# Patient Record
Sex: FEMALE | Race: WHITE | NOT HISPANIC OR LATINO | Employment: FULL TIME | URBAN - METROPOLITAN AREA
[De-identification: names, ages, dates, MRNs, and addresses within clinical notes are randomized per-mention and may not be internally consistent; named-entity substitution may affect disease eponyms.]

---

## 2017-01-06 ENCOUNTER — DOCUMENTATION ONLY (OUTPATIENT)
Dept: LAB | Facility: CLINIC | Age: 52
End: 2017-01-06

## 2017-01-06 DIAGNOSIS — E89.0 POSTSURGICAL HYPOTHYROIDISM: Primary | ICD-10-CM

## 2017-01-10 ENCOUNTER — OFFICE VISIT (OUTPATIENT)
Dept: ENDOCRINOLOGY | Facility: CLINIC | Age: 52
End: 2017-01-10
Payer: COMMERCIAL

## 2017-01-10 VITALS
BODY MASS INDEX: 19.91 KG/M2 | DIASTOLIC BLOOD PRESSURE: 84 MMHG | WEIGHT: 112.4 LBS | HEIGHT: 63 IN | HEART RATE: 96 BPM | SYSTOLIC BLOOD PRESSURE: 115 MMHG

## 2017-01-10 DIAGNOSIS — E89.0 POSTSURGICAL HYPOTHYROIDISM: Primary | ICD-10-CM

## 2017-01-10 DIAGNOSIS — E04.1 THYROID NODULE: ICD-10-CM

## 2017-01-10 DIAGNOSIS — N95.1 MENOPAUSAL SYNDROME (HOT FLASHES): ICD-10-CM

## 2017-01-10 DIAGNOSIS — E89.0 POSTSURGICAL HYPOTHYROIDISM: ICD-10-CM

## 2017-01-10 LAB
T4 FREE SERPL-MCNC: 1.29 NG/DL (ref 0.76–1.46)
TSH SERPL DL<=0.05 MIU/L-ACNC: 3.03 MU/L (ref 0.4–4)

## 2017-01-10 PROCEDURE — 84443 ASSAY THYROID STIM HORMONE: CPT | Performed by: INTERNAL MEDICINE

## 2017-01-10 PROCEDURE — 36415 COLL VENOUS BLD VENIPUNCTURE: CPT | Performed by: INTERNAL MEDICINE

## 2017-01-10 PROCEDURE — 99214 OFFICE O/P EST MOD 30 MIN: CPT | Performed by: INTERNAL MEDICINE

## 2017-01-10 PROCEDURE — 84439 ASSAY OF FREE THYROXINE: CPT | Performed by: INTERNAL MEDICINE

## 2017-01-10 RX ORDER — LAMOTRIGINE 100 MG/1
200 TABLET ORAL DAILY
COMMUNITY
End: 2018-11-28

## 2017-01-10 NOTE — Clinical Note
1/10/2017      RE: Cynthia Marie Barthel  [address] [place] [address]         The patient is seen in f/up for thyroid nodules and hypothyroidism.    Today she feels good and she denies any specific complaints with the exception of hot flashes, which become more severe over the last 2-4 months, both during daytime and at night. She has been experiencing occasional hot flashes for over 1 year. For the last 8 years, she hasn't been having menstrual periods, as she had a Mirena IUD placed.   The antidepressive medications were recently changed and she reports feeling much better.     Johanne first discovered a lump on the left side of the neck in the year 2000. Thyroid ultrasound revealed a dominant left thyroid nodule and a few small thyroid nodules present on the right thyroid lobe. The result of the fine needle aspiration of the left nodule was inconclusive. The patient underwent left hemithyroidectomy in November 2000. After surgery, she was started on levothyroxine. Subsequently, she had f/up thyroid ultrasounds done for a small right nodule in October 2012, December 2012 and in October 2013. On the most recent ultrasound images, the small colloid looking nodule in the right lobe measured 0.7 cm.    Current levothyroxine replacement dose is 75  g daily.     Past Medical History   Scoliosis - at birth   Fusion of the spine surgery   3 sinus surgeries   Allergies   Thyroid nodules   L hemithyroidectomy   Hypothyroidism post thyroidectomy   She had a mammogram done in December 2011 which was reportedly normal.  Mild sleep apnea - dx 2015 not using the CPAP machine   IUD for 8 years   Depression since 4 yo, treated since 2001    Current Medications  Prescription Medications as of 1/10/2017             lamoTRIgine (LAMICTAL) 100 MG tablet Take 150 mg by mouth daily    tiZANidine (ZANAFLEX) 4 MG tablet Take 4 mg by mouth 3 times daily as needed for muscle spasms    tazarotene (TAZORAC) 0.05 % gel Apply a  pea sized amount to the face at bedtime.    fluorouracil (EFUDEX) 5 % cream Use  Efudex twice daily for 2-3 weeks or until the onset of irritation.    levothyroxine (SYNTHROID, LEVOTHROID) 75 MCG tablet Take 1 tablet (75 mcg) by mouth daily    buPROPion (WELLBUTRIN) 75 MG tablet Take 100 mg by mouth daily     vitamin E 400 UNIT capsule Take  by mouth daily.    Glucosamine-Chondroitin (GLUCOSAMINE CHONDR COMPLEX PO) Take  by mouth.    Omega-3 Fatty Acids (FISH OIL PO) Take  by mouth daily.    Multiple Vitamin CAPS Take  by mouth daily.    B Complex Vitamins (B COMPLEX 1) TABS Take  by mouth daily.    Cholecalciferol (VITAMIN D) 1000 UNIT capsule Take 1 capsule by mouth daily.        Family History  Father, grandfather, uncles had depression. No thyroid. No diabetes. Great uncles and aunts had colon cancer.  Maternal aunts - osteoporosis.     Social History  . No children. Quit smoking 2 years ago. Quit drinking alcohol a few months ao. Occupation: . Takes MVI, B complex, fish oil, [employer].     Review of Systems   Systemic:             Weight stable, no sign fatigue   Eye:                      No eye symptoms   Krystle-Laryngeal:     No krystle-laryngeal symptoms, no dysphagia, no hoarseness, no cough     Breast:                  No breast symptoms  Cardiovascular:    No cardiovascular symptoms, no CP or palpitations   Pulmonary:           No pulmonary symptoms, no SOB or cough    Gastrointestinal:   No diarrhea or constipation   Genitourinary:       no increased thirst, no increased urination   Endocrine:            Has a Mirena IUD  - placed 2013; hot flashes for > one year - more prevalent in the last few months   Neurological:       occasional headaches in am, no tremor, numbness in her back longstaning, no dizziness   Musculoskeletal:  joint pain - hips, knees, neck.   Skin:                     Mild dry skin, no hair falling out   Psychological:      Depression is well controlled.     Vital Signs    "  Previous Weights:   Wt Readings from Last 10 Encounters:   01/10/17 50.984 kg (112 lb 6.4 oz)   01/06/16 55.537 kg (122 lb 7 oz)   11/11/14 53.524 kg (118 lb)   10/29/13 51.256 kg (113 lb)   12/26/12 53.388 kg (117 lb 11.2 oz)   10/02/12 53.842 kg (118 lb 11.2 oz)   10/05/11 54.432 kg (120 lb)   08/24/11 53.978 kg (119 lb)       /84 mmHg  Pulse 96  Ht 1.588 m (5' 2.5\")  Wt 50.984 kg (112 lb 6.4 oz)  BMI 20.22 kg/m2      Physical Exam  General Appearance: scoliosis noted;  well nourished and in no distress     Eyes:  conjutivae and extra-ocular motions are normal.                                    pupils round and reactive to light, no lid lag, no stare    HEENT:   oropharynx clear and moist, no JVD, no bruits      unable to palpate the thyroid, palpable small R medial supraclavicular mass, stable   Cardiovascular:  regular rhythm, no murmurs, distal pulse palpable, no edema  Respiratory:        chest clear, no rales, no rhonchi   Musculoskeletal:  normal tone and strength  Psychological:          affect depressed, judgment normal  Skin:  warm, no lesions  Neurological:  reflexes normal and symmetric, no resting tremor.     Lab Results  Negative thyroglobulin antibody and a thyroglobulin level of 2.2 in November 2009.  9/27/13  FSH 5  Estradiol 68 (Postmenopausal range less than 54.7).    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 There was no evidence of malignancy. A single parathyroid gland was removed.  TSH   Date Value Ref Range Status   01/10/2017 3.03 0.40 - 4.00 mU/L Final     T4 FREE   Date Value Ref Range Status   01/10/2017 1.29 0.76 - 1.46 ng/dL Final       Assessment/Plan    1. Hypothyroidism secondary to nahomy-thyroidectomy  The patient is clinically and biochemically euthyroid.  I recommended to continue to take the same dose of levothyroxine.    2. Right subcentimeter thyroid nodule - with no suspicious features on the prior thyroid " USs. We'll continue to monitor clinically.    3. Hot flashes   Discussed about the option of starting HRT, especially if they worsens. Counseled on benefits and side effects. Otherwise, she can discuss with her phycologist the option of trying antidepressive medications which can also address the hot flashes.     RTC 1 year.  No orders of the defined types were placed in this encounter.                             Gabriela Muir MD

## 2017-01-10 NOTE — MR AVS SNAPSHOT
After Visit Summary   1/10/2017    Cynthia Marie Barthel    MRN: [id number]           Patient Information     Date Of Birth          1965        Visit Information        Provider Department      1/10/2017 11:00 AM Gabriela Muir MD [facility]        Care Instructions    Thank you for choosing the [facility], Department of Endocrinology. It has been a pleasure servicing you today. Please contact us at [phone] with any questions. If you need to speak to an Endocrinologist and it is after 5:00 pm or on a weekend, please call the On-Call Endocrinologist at [phone].          Follow-ups after your visit        Who to contact     If you have questions or need follow up information about today's clinic visit or your schedule please contact [facility] directly at [phone].  Normal or non-critical lab and imaging results will be communicated to you by NextUserhart, letter or phone within 4 business days after the clinic has received the results. If you do not hear from us within 7 days, please contact the clinic through NextUserhart or phone. If you have a critical or abnormal lab result, we will notify you by phone as soon as possible.  Submit refill requests through Kyron or call your pharmacy and they will forward the refill request to us. Please allow 3 business days for your refill to be completed.          Additional Information About Your Visit        MyChart Information     Kyron gives you secure access to your electronic health record. If you see a primary care provider, you can also send messages to your care team and make appointments. If you have questions, please call your primary care clinic.  If you do not have a primary care provider, please call [phone] and they will assist you.      Kyron is an electronic gateway that provides easy, online access to your medical records. With Kyron, you can request a clinic  "appointment, read your test results, renew a prescription or communicate with your care team.     To access your existing account, please contact your [facility] or call [phone] for assistance.        Care EveryWhere ID     This is your Care EveryWhere ID. This could be used by other organizations to access your [place] medical records  [id number]        Your Vitals Were     Pulse Height BMI (Body Mass Index)             96 1.588 m (5' 2.5\") 20.22 kg/m2          Blood Pressure from Last 3 Encounters:   01/10/17 115/84   01/06/16 94/58   11/11/14 91/58    Weight from Last 3 Encounters:   01/10/17 50.984 kg (112 lb 6.4 oz)   01/06/16 55.537 kg (122 lb 7 oz)   11/11/14 53.524 kg (118 lb)              Today, you had the following     No orders found for display       Primary Care Provider Office Phone # Fax #    Corazon Jara [phone]        [facility] [address] [place] [address] [address] [facility] [address]        Thank you!     Thank you for choosing [facility]  for your care. Our goal is always to provide you with excellent care. Hearing back from our patients is one way we can continue to improve our services. Please take a few minutes to complete the written survey that you may receive in the mail after your visit with us. Thank you!             Your Updated Medication List - Protect others around you: Learn how to safely use, store and throw away your medicines at www.disposemymeds.org.          This list is accurate as of: 1/10/17 11:47 AM.  Always use your most recent med list.                   Brand Name Dispense Instructions for use    B COMPLEX 1 Tabs      Take  by mouth daily.       FISH OIL PO      Take  by mouth daily.       fluorouracil 5 % cream    EFUDEX    40 g    Use  Efudex twice daily for 2-3 weeks or until the onset of irritation.       GLUCOSAMINE CHONDR COMPLEX PO      Take  by mouth.       lamoTRIgine 100 MG tablet    " LaMICtal     Take 150 mg by mouth daily       levothyroxine 75 MCG tablet    SYNTHROID/LEVOTHROID    90 tablet    Take 1 tablet (75 mcg) by mouth daily       Multiple vitamin Caps      Take  by mouth daily.       tazarotene 0.05 % topical gel    TAZORAC    30 g    Apply a pea sized amount to the face at bedtime.       tiZANidine 4 MG tablet    ZANAFLEX     Take 4 mg by mouth 3 times daily as needed for muscle spasms       vitamin D 1000 UNITS capsule      Take 1 capsule by mouth daily.       vitamin E 400 UNIT capsule      Take  by mouth daily.       WELLBUTRIN 75 MG tablet   Generic drug:  buPROPion      Take 100 mg by mouth daily

## 2017-01-10 NOTE — PATIENT INSTRUCTIONS
Thank you for choosing the [facility], Department of Endocrinology. It has been a pleasure servicing you today. Please contact us at [phone] with any questions. If you need to speak to an Endocrinologist and it is after 5:00 pm or on a weekend, please call the On-Call Endocrinologist at [phone].

## 2017-01-10 NOTE — PROGRESS NOTES
The patient is seen in f/up for thyroid nodules and hypothyroidism.    Today she feels good and she denies any specific complaints with the exception of hot flashes, which become more severe over the last 2-4 months, both during daytime and at night. She has been experiencing occasional hot flashes for over 1 year. For the last 8 years, she hasn't been having menstrual periods, as she had a Mirena IUD placed.   The antidepressive medications were recently changed and she reports feeling much better.     Johanne first discovered a lump on the left side of the neck in the year 2000. Thyroid ultrasound revealed a dominant left thyroid nodule and a few small thyroid nodules present on the right thyroid lobe. The result of the fine needle aspiration of the left nodule was inconclusive. The patient underwent left hemithyroidectomy in November 2000. After surgery, she was started on levothyroxine. Subsequently, she had f/up thyroid ultrasounds done for a small right nodule in October 2012, December 2012 and in October 2013. On the most recent ultrasound images, the small colloid looking nodule in the right lobe measured 0.7 cm.    Current levothyroxine replacement dose is 75  g daily.     Past Medical History   Scoliosis - at birth   Fusion of the spine surgery   3 sinus surgeries   Allergies   Thyroid nodules   L hemithyroidectomy   Hypothyroidism post thyroidectomy   She had a mammogram done in December 2011 which was reportedly normal.  Mild sleep apnea - dx 2015 not using the CPAP machine   IUD for 8 years   Depression since 4 yo, treated since 2001    Current Medications  Prescription Medications as of 1/10/2017             lamoTRIgine (LAMICTAL) 100 MG tablet Take 150 mg by mouth daily    tiZANidine (ZANAFLEX) 4 MG tablet Take 4 mg by mouth 3 times daily as needed for muscle spasms    tazarotene (TAZORAC) 0.05 % gel Apply a pea sized amount to the face at bedtime.    fluorouracil (EFUDEX) 5 % cream Use  Efudex  twice daily for 2-3 weeks or until the onset of irritation.    levothyroxine (SYNTHROID, LEVOTHROID) 75 MCG tablet Take 1 tablet (75 mcg) by mouth daily    buPROPion (WELLBUTRIN) 75 MG tablet Take 100 mg by mouth daily     vitamin E 400 UNIT capsule Take  by mouth daily.    Glucosamine-Chondroitin (GLUCOSAMINE CHONDR COMPLEX PO) Take  by mouth.    Omega-3 Fatty Acids (FISH OIL PO) Take  by mouth daily.    Multiple Vitamin CAPS Take  by mouth daily.    B Complex Vitamins (B COMPLEX 1) TABS Take  by mouth daily.    Cholecalciferol (VITAMIN D) 1000 UNIT capsule Take 1 capsule by mouth daily.        Family History  Father, grandfather, uncles had depression. No thyroid. No diabetes. Great uncles and aunts had colon cancer.  Maternal aunts - osteoporosis.     Social History  . No children. Quit smoking 2 years ago. Quit drinking alcohol a few months ao. Occupation: . Takes MVI, B complex, fish oil, [employer].     Review of Systems   Systemic:             Weight stable, no sign fatigue   Eye:                      No eye symptoms   Krystle-Laryngeal:     No krystle-laryngeal symptoms, no dysphagia, no hoarseness, no cough     Breast:                  No breast symptoms  Cardiovascular:    No cardiovascular symptoms, no CP or palpitations   Pulmonary:           No pulmonary symptoms, no SOB or cough    Gastrointestinal:   No diarrhea or constipation   Genitourinary:       no increased thirst, no increased urination   Endocrine:            Has a Mirena IUD  - placed 2013; hot flashes for > one year - more prevalent in the last few months   Neurological:       occasional headaches in am, no tremor, numbness in her back longstaning, no dizziness   Musculoskeletal:  joint pain - hips, knees, neck.   Skin:                     Mild dry skin, no hair falling out   Psychological:      Depression is well controlled.     Vital Signs     Previous Weights:   Wt Readings from Last 10 Encounters:   01/10/17 50.984 kg (112 lb 6.4  "oz)   01/06/16 55.537 kg (122 lb 7 oz)   11/11/14 53.524 kg (118 lb)   10/29/13 51.256 kg (113 lb)   12/26/12 53.388 kg (117 lb 11.2 oz)   10/02/12 53.842 kg (118 lb 11.2 oz)   10/05/11 54.432 kg (120 lb)   08/24/11 53.978 kg (119 lb)       /84 mmHg  Pulse 96  Ht 1.588 m (5' 2.5\")  Wt 50.984 kg (112 lb 6.4 oz)  BMI 20.22 kg/m2      Physical Exam  General Appearance: scoliosis noted;  well nourished and in no distress     Eyes:  conjutivae and extra-ocular motions are normal.                                    pupils round and reactive to light, no lid lag, no stare    HEENT:   oropharynx clear and moist, no JVD, no bruits      unable to palpate the thyroid, palpable small R medial supraclavicular mass, stable   Cardiovascular:  regular rhythm, no murmurs, distal pulse palpable, no edema  Respiratory:        chest clear, no rales, no rhonchi   Musculoskeletal:  normal tone and strength  Psychological:          affect depressed, judgment normal  Skin:  warm, no lesions  Neurological:  reflexes normal and symmetric, no resting tremor.     Lab Results  Negative thyroglobulin antibody and a thyroglobulin level of 2.2 in November 2009.  9/27/13  FSH 5  Estradiol 68 (Postmenopausal range less than 54.7).    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 There was no evidence of malignancy. A single parathyroid gland was removed.  TSH   Date Value Ref Range Status   01/10/2017 3.03 0.40 - 4.00 mU/L Final     T4 FREE   Date Value Ref Range Status   01/10/2017 1.29 0.76 - 1.46 ng/dL Final       Assessment/Plan    1. Hypothyroidism secondary to nahomy-thyroidectomy  The patient is clinically and biochemically euthyroid.  I recommended to continue to take the same dose of levothyroxine.    2. Right subcentimeter thyroid nodule - with no suspicious features on the prior thyroid USs. We'll continue to monitor clinically.    3. Hot flashes   Discussed about the option of " starting HRT, especially if they worsens. Counseled on benefits and side effects. Otherwise, she can discuss with her phycologist the option of trying antidepressive medications which can also address the hot flashes.     RTC 1 year.  No orders of the defined types were placed in this encounter.

## 2017-01-10 NOTE — NURSING NOTE
"Cynthia Marie Barthel's goals for this visit include:   Chief Complaint   Patient presents with     Endocrine Problem     Hypothyroid       She requests these members of her care team be copied on today's visit information: Yes PCP    PCP: Corazon Jara    Referring Provider:  Corazon Jara MD   [facility]  [address] [place] [address]    Chief Complaint   Patient presents with     Endocrine Problem     Hypothyroid       Initial /84 mmHg  Pulse 96  Ht 1.588 m (5' 2.5\")  Wt 50.984 kg (112 lb 6.4 oz)  BMI 20.22 kg/m2 Estimated body mass index is 20.22 kg/(m^2) as calculated from the following:    Height as of this encounter: 1.588 m (5' 2.5\").    Weight as of this encounter: 50.984 kg (112 lb 6.4 oz).  BP completed using cuff size: regular    Do you need any medication refills at today's visit? NO    "

## 2017-02-23 ENCOUNTER — OFFICE VISIT (OUTPATIENT)
Dept: DERMATOLOGY | Facility: CLINIC | Age: 52
End: 2017-02-23
Payer: COMMERCIAL

## 2017-02-23 DIAGNOSIS — D22.5 MELANOCYTIC NEVUS OF TRUNK: ICD-10-CM

## 2017-02-23 DIAGNOSIS — L82.0 INFLAMED SEBORRHEIC KERATOSIS: Primary | ICD-10-CM

## 2017-02-23 DIAGNOSIS — D18.01 CHERRY ANGIOMA: ICD-10-CM

## 2017-02-23 DIAGNOSIS — L82.1 SK (SEBORRHEIC KERATOSIS): ICD-10-CM

## 2017-02-23 PROCEDURE — 17110 DESTRUCTION B9 LES UP TO 14: CPT | Performed by: DERMATOLOGY

## 2017-02-23 PROCEDURE — 99213 OFFICE O/P EST LOW 20 MIN: CPT | Mod: 25 | Performed by: DERMATOLOGY

## 2017-02-23 NOTE — PROGRESS NOTES
[facility] Dermatology Note      Dermatology Problem List:  1. Actinic keratosis  -History of Efudex use with redness of the face and swelling, possible allergy  2. Acne vulgaris  -s/p Tazorac with improvement  3. Sebaceous hyperplasia  -s/p destruction with hyfrecator 1/15/2015  4. Photoaging  -3 vitalize peels by outside provider with no issues but limited improvement.     Encounter Date: Feb 23, 2017    CC:  Chief Complaint   Patient presents with     Derm Problem     spot on chest          History of Present Illness:  This 51 year old female presents as a follow-up for sebaceous hyperplasia and AK. The patient was last seen 2/11/2016 when Fraxel treatments was discussed for rhytides and photoaging. Today, the patient reports there is a spot on her chest that is irritated and itchy. She has also picked at it. It appears to be non healing. She is otherwise feeling well with no additional skin concerns.     Past Medical History:   Past Medical History   Diagnosis Date     Arthritis      No past surgical history on file.    Social History:  Reviewed and unchanged but kept in chart for clinician convenience  The patient works as a an .    Family History:  Reviewed and unchanged but kept in chart for clinician convenience  There is an unknown family history of skin cancer in the patient's father.  There is a family history of hay fever.  There is no family history of asthma, psoriasis, or eczema.     Medications:  Current Outpatient Prescriptions   Medication Sig Dispense Refill     lamoTRIgine (LAMICTAL) 100 MG tablet Take 150 mg by mouth daily       tiZANidine (ZANAFLEX) 4 MG tablet Take 4 mg by mouth 3 times daily as needed for muscle spasms       tazarotene (TAZORAC) 0.05 % gel Apply a pea sized amount to the face at bedtime. 30 g 11     fluorouracil (EFUDEX) 5 % cream Use  Efudex twice daily for 2-3 weeks or until the onset of irritation. 40 g 0     levothyroxine (SYNTHROID,  LEVOTHROID) 75 MCG tablet Take 1 tablet (75 mcg) by mouth daily 90 tablet 3     buPROPion (WELLBUTRIN) 75 MG tablet Take 100 mg by mouth daily        vitamin E 400 UNIT capsule Take  by mouth daily.       Glucosamine-Chondroitin (GLUCOSAMINE CHONDR COMPLEX PO) Take  by mouth.       Omega-3 Fatty Acids (FISH OIL PO) Take  by mouth daily.       Multiple Vitamin CAPS Take  by mouth daily.       B Complex Vitamins (B COMPLEX 1) TABS Take  by mouth daily.       Cholecalciferol (VITAMIN D) 1000 UNIT capsule Take 1 capsule by mouth daily.            Allergies   Allergen Reactions     Hay Fever & [A.R.M.]          Review of Systems:  -Skin: As above in HPI. No additional skin concerns.  -Otherwise generally feeling well.     Physical exam:  GEN: This is a well-nourished, well developed female in no acute distress  NEURO: Alert and oriented  PSYCH: in a pleasant mood, appropriate affect  SKIN: Focused exam which included the head/face, neck, back, abdomen, and chest.   -There is a waxy stuck on tan to brown papule on the left collar bone, it is inflamed and others non inflamed on the back.  -Multiple regular brown pigmented macules and papules are identified on the back.   -There are bright red dome shaped papules scattered on the abdomen and chest.   -No other lesions of concern on areas examined.     Impression/Plan:  1. Cherry angioma: Benign nature was discussed. It was asking about what these are even though they were previously noted.  2. Multiple Benign nevi on the back: Benign nature was discussed.   3. Inflamed Seborrheic keratosis: left collar bone and some non inflamed SK's on the back    Cryotherapy procedure note: After verbal consent and discussion of risks and benefits including but no limited to dyspigmentation/scar, blister, and pain, 6 were treated with 1-2mm freeze border for 2 cycles with liquid nitrogen. Post cryotherapy instructions were provided.     Follow-up as scheduled with pinky Zamorano for  new or changing lesions.       Staff Involved:  Scribe/Staff      Scribe Disclosure:   I, Irene Todd, am serving as a scribe to document services personally performed by Dr. John Finney, based on data collection and the provider's statements to me.     Provider Disclosure:   I have reviewed the documentation recorded by the scribe and have edited it as needed. I have personally performed the services documented here and the documentation accurately represents those services and the decisions made by me.     John Finney MD, MS    Department of Dermatology  [facility]: Phone: [phone], Fax:[phone]  [facility]: Phone: [phone], Fax: [phone]

## 2017-02-23 NOTE — NURSING NOTE
Dermatology Rooming Note    Cynthia Marie Barthel's goals for this visit include:   Chief Complaint   Patient presents with     Derm Problem     spot on chest        Is a scribe okay for this visit:YES    Are records needed for this visit(If yes, obtain release of information): No,     Vitals: There were no vitals taken for this visit.    Referring Provider:  No referring provider defined for this encounter.

## 2017-02-23 NOTE — MR AVS SNAPSHOT
After Visit Summary   2/23/2017    Cynthia Marie Barthel    MRN: [id number]           Patient Information     Date Of Birth          1965        Visit Information        Provider Department      2/23/2017 1:15 PM John Finney MD [facility] [[email or web address]]        Today's Diagnoses     Inflamed seborrheic keratosis    -  1    SK (seborrheic keratosis)        Cherry angioma        Melanocytic nevus of trunk          Care Instructions    Cryotherapy    What is it?    Use of a very cold liquid, such as liquid nitrogen, to freeze and destroy abnormal skin cells that need to be removed    What should I expect?    Tenderness and redness    A small blister that might grow and fill with dark purple blood. There may be crusting.    More than one treatment may be needed if the lesions do not go away.    How do I care for the treated area?    Gently wash the area with your hands when bathing.    Use a thin layer of Vaseline to help with healing. You may use a Band-Aid.     The area should heal within 7-10 days and may leave behind a pink or lighter color.     Do not use an antibiotic or Neosporin ointment.     You may take acetaminophen (Tylenol) for pain.     Call your Doctor if you have:    Severe pain    Signs of infection (warmth, redness, cloudy yellow drainage, and or a bad smell)    Questions or concerns    Who should I call with questions?       [facility]: [phone]       [facility]: [phone]       For urgent needs outside of business hours call the [facility] at [phone]        and ask for the dermatology resident on call        Follow-ups after your visit        Your next 10 appointments already scheduled     May 16, 2017 12:45 PM CDT   Return Visit with Dariana Forde MD   [facility] [[email or web address]] ([facility] [[email or web address]])    [address] [place] [id number] [phone]            Feb  06, 2018 11:00 AM CST   LAB with LAB FIRST FLOOR [facility] ([facility])    [address] [address] [place] [address] [phone]           Patient must bring picture ID.  Patient should be prepared to give a urine specimen  Please do not eat 10-12 hours before your appointment if you are coming in fasting for labs on lipids, cholesterol, or glucose (sugar).  Pregnant women should follow their Care Team instructions. Water with medications is okay. Do not drink coffee or other fluids.   If you have concerns about taking  your medications, please ask at office or if scheduling via [employer], send a message by clicking on Secure Messaging, Message Your Care Team.            Feb 06, 2018 11:30 AM CST   Return Visit with Gabriela Muir MD   [facility] ([facility])    [address] [facility] [id number] [phone]              Who to contact     If you have questions or need follow up information about today's clinic visit or your schedule please contact [facility] directly at [phone].  Normal or non-critical lab and imaging results will be communicated to you by MyChart, letter or phone within 4 business days after the clinic has received the results. If you do not hear from us within 7 days, please contact the clinic through [employer] or phone. If you have a critical or abnormal lab result, we will notify you by phone as soon as possible.  Submit refill requests through [employer] or call your pharmacy and they will forward the refill request to us. Please allow 3 business days for your refill to be completed.          Additional Information About Your Visit        [employer] Information     [employer] gives you secure access to your electronic health record. If you see a primary care provider, you can also send messages to your care team and make appointments. If you have questions, please call  your primary care clinic.  If you do not have a primary care provider, please call [phone] and they will assist you.      [employer] is an electronic gateway that provides easy, online access to your medical records. With [employer], you can request a clinic appointment, read your test results, renew a prescription or communicate with your care team.     To access your existing account, please contact your [facility] or call [phone] for assistance.        Care EveryWhere ID     This is your Care EveryWhere ID. This could be used by other organizations to access your [facility] medical records  [id number]         Blood Pressure from Last 3 Encounters:   01/10/17 115/84   01/06/16 94/58   11/11/14 91/58    Weight from Last 3 Encounters:   01/10/17 51 kg (112 lb 6.4 oz)   01/06/16 55.5 kg (122 lb 7 oz)   11/11/14 53.5 kg (118 lb)              We Performed the Following     DESTRUCT BENIGN LESION, UP TO 14        Primary Care Provider Office Phone # Fax #    Corazon Jara [phone]        [facility]ILY [facility] [address]  [facility] [address]        Thank you!     Thank you for choosing [facility]  for your care. Our goal is always to provide you with excellent care. Hearing back from our patients is one way we can continue to improve our services. Please take a few minutes to complete the written survey that you may receive in the mail after your visit with us. Thank you!             Your Updated Medication List - Protect others around you: Learn how to safely use, store and throw away your medicines at www.disposemymeds.org.          This list is accurate as of: 2/23/17  1:54 PM.  Always use your most recent med list.                   Brand Name Dispense Instructions for use    B COMPLEX 1 Tabs      Take  by mouth daily.       FISH OIL PO      Take  by mouth daily.       fluorouracil 5 % cream    EFUDEX    40 g    Use  Efudex twice  daily for 2-3 weeks or until the onset of irritation.       GLUCOSAMINE CHONDR COMPLEX PO      Take  by mouth.       lamoTRIgine 100 MG tablet    LaMICtal     Take 150 mg by mouth daily       levothyroxine 75 MCG tablet    SYNTHROID/LEVOTHROID    90 tablet    Take 1 tablet (75 mcg) by mouth daily       Multiple vitamin Caps      Take  by mouth daily.       tazarotene 0.05 % topical gel    TAZORAC    30 g    Apply a pea sized amount to the face at bedtime.       tiZANidine 4 MG tablet    ZANAFLEX     Take 4 mg by mouth 3 times daily as needed for muscle spasms       vitamin D 1000 UNITS capsule      Take 1 capsule by mouth daily.       vitamin E 400 UNIT capsule      Take  by mouth daily.       WELLBUTRIN 75 MG tablet   Generic drug:  buPROPion      Take 100 mg by mouth daily

## 2017-02-23 NOTE — PATIENT INSTRUCTIONS
Cryotherapy    What is it?    Use of a very cold liquid, such as liquid nitrogen, to freeze and destroy abnormal skin cells that need to be removed    What should I expect?    Tenderness and redness    A small blister that might grow and fill with dark purple blood. There may be crusting.    More than one treatment may be needed if the lesions do not go away.    How do I care for the treated area?    Gently wash the area with your hands when bathing.    Use a thin layer of Vaseline to help with healing. You may use a Band-Aid.     The area should heal within 7-10 days and may leave behind a pink or lighter color.     Do not use an antibiotic or Neosporin ointment.     You may take acetaminophen (Tylenol) for pain.     Call your Doctor if you have:    Severe pain    Signs of infection (warmth, redness, cloudy yellow drainage, and or a bad smell)    Questions or concerns    Who should I call with questions?       [facility]: [phone]       [facility]: [phone]       For urgent needs outside of business hours call the [facility] at [phone]        and ask for the dermatology resident on call

## 2017-02-23 NOTE — LETTER
2/23/2017       RE: Cynthia Marie Barthel  [address]  [place] [address]     Dear Colleague,    Thank you for referring your patient, Cynthia Marie Barthel, to the [facility]. Please see a copy of my visit note below.    [facility] Dermatology Note      Dermatology Problem List:  1. Actinic keratosis  -History of Efudex use with redness of the face and swelling, possible allergy  2. Acne vulgaris  -s/p Tazorac with improvement  3. Sebaceous hyperplasia  -s/p destruction with hyfrecator 1/15/2015  4. Photoaging  -3 vitalize peels by outside provider with no issues but limited improvement.     Encounter Date: Feb 23, 2017    CC:  Chief Complaint   Patient presents with     Derm Problem     spot on chest          History of Present Illness:  This 51 year old female presents as a follow-up for sebaceous hyperplasia and AK. The patient was last seen 2/11/2016 when Fraxel treatments was discussed for rhytides and photoaging. Today, the patient reports there is a spot on her chest that is irritated and itchy. She has also picked at it. It appears to be non healing. She is otherwise feeling well with no additional skin concerns.     Past Medical History:   Past Medical History   Diagnosis Date     Arthritis      No past surgical history on file.    Social History:  Reviewed and unchanged but kept in chart for clinician convenience  The patient works as a an .    Family History:  Reviewed and unchanged but kept in chart for clinician convenience  There is an unknown family history of skin cancer in the patient's father.  There is a family history of hay fever.  There is no family history of asthma, psoriasis, or eczema.     Medications:  Current Outpatient Prescriptions   Medication Sig Dispense Refill     lamoTRIgine (LAMICTAL) 100 MG tablet Take 150 mg by mouth daily       tiZANidine (ZANAFLEX) 4 MG tablet Take 4  mg by mouth 3 times daily as needed for muscle spasms       tazarotene (TAZORAC) 0.05 % gel Apply a pea sized amount to the face at bedtime. 30 g 11     fluorouracil (EFUDEX) 5 % cream Use  Efudex twice daily for 2-3 weeks or until the onset of irritation. 40 g 0     levothyroxine (SYNTHROID, LEVOTHROID) 75 MCG tablet Take 1 tablet (75 mcg) by mouth daily 90 tablet 3     buPROPion (WELLBUTRIN) 75 MG tablet Take 100 mg by mouth daily        vitamin E 400 UNIT capsule Take  by mouth daily.       Glucosamine-Chondroitin (GLUCOSAMINE CHONDR COMPLEX PO) Take  by mouth.       Omega-3 Fatty Acids (FISH OIL PO) Take  by mouth daily.       Multiple Vitamin CAPS Take  by mouth daily.       B Complex Vitamins (B COMPLEX 1) TABS Take  by mouth daily.       Cholecalciferol (VITAMIN D) 1000 UNIT capsule Take 1 capsule by mouth daily.            Allergies   Allergen Reactions     Hay Fever & [A.R.M.]          Review of Systems:  -Skin: As above in HPI. No additional skin concerns.  -Otherwise generally feeling well.     Physical exam:  GEN: This is a well-nourished, well developed female in no acute distress  NEURO: Alert and oriented  PSYCH: in a pleasant mood, appropriate affect  SKIN: Focused exam which included the head/face, neck, back, abdomen, and chest.   -There is a waxy stuck on tan to brown papule on the left collar bone, it is inflamed and others non inflamed on the back.  -Multiple regular brown pigmented macules and papules are identified on the back.   -There are bright red dome shaped papules scattered on the abdomen and chest.   -No other lesions of concern on areas examined.     Impression/Plan:  1. Cherry angioma: Benign nature was discussed. It was asking about what these are even though they were previously noted.  2. Multiple Benign nevi on the back: Benign nature was discussed.   3. Inflamed Seborrheic keratosis: left collar bone and some non inflamed SK's on the back    Cryotherapy procedure note: After  verbal consent and discussion of risks and benefits including but no limited to dyspigmentation/scar, blister, and pain, 6 were treated with 1-2mm freeze border for 2 cycles with liquid nitrogen. Post cryotherapy instructions were provided.     Follow-up as scheduled with Dr. Forde, earlier for new or changing lesions.       Staff Involved:  Scribe/Staff      Scribe Disclosure:   I, Irene Todd, am serving as a scribe to document services personally performed by Dr. John Finney, based on data collection and the provider's statements to me.     Provider Disclosure:   I have reviewed the documentation recorded by the scribe and have edited it as needed. I have personally performed the services documented here and the documentation accurately represents those services and the decisions made by me.     John Finney MD, MS    Department of Dermatology  [facility]: Phone: [phone], Fax:[phone]  [facility]: Phone: [phone], Fax: [phone]

## 2017-03-03 DIAGNOSIS — E89.0 POSTSURGICAL HYPOTHYROIDISM: ICD-10-CM

## 2017-03-03 RX ORDER — LEVOTHYROXINE SODIUM 75 UG/1
TABLET ORAL
Qty: 90 TABLET | Refills: 3 | Status: SHIPPED | OUTPATIENT
Start: 2017-03-03 | End: 2018-02-06

## 2017-04-17 ENCOUNTER — OFFICE VISIT (OUTPATIENT)
Dept: PEDIATRICS | Facility: CLINIC | Age: 52
End: 2017-04-17
Payer: COMMERCIAL

## 2017-04-17 VITALS
DIASTOLIC BLOOD PRESSURE: 60 MMHG | BODY MASS INDEX: 20.46 KG/M2 | TEMPERATURE: 98.1 F | WEIGHT: 113.7 LBS | HEART RATE: 88 BPM | SYSTOLIC BLOOD PRESSURE: 100 MMHG | OXYGEN SATURATION: 96 %

## 2017-04-17 DIAGNOSIS — E89.0 POSTSURGICAL HYPOTHYROIDISM: ICD-10-CM

## 2017-04-17 DIAGNOSIS — G93.89 CEREBRAL GLIOSIS: ICD-10-CM

## 2017-04-17 DIAGNOSIS — H81.13 BPV (BENIGN POSITIONAL VERTIGO), BILATERAL: ICD-10-CM

## 2017-04-17 DIAGNOSIS — R42 DIZZINESS: Primary | ICD-10-CM

## 2017-04-17 DIAGNOSIS — E34.8 CYST OF PINEAL GLAND: ICD-10-CM

## 2017-04-17 PROCEDURE — 99204 OFFICE O/P NEW MOD 45 MIN: CPT | Performed by: FAMILY MEDICINE

## 2017-04-17 RX ORDER — VENLAFAXINE HYDROCHLORIDE 75 MG/1
37.5 CAPSULE, EXTENDED RELEASE ORAL DAILY
COMMUNITY
Start: 2017-07-17 | End: 2017-11-13

## 2017-04-17 RX ORDER — LORAZEPAM 0.5 MG/1
0.25-0.5 TABLET ORAL EVERY 8 HOURS PRN
Qty: 15 TABLET | Refills: 0 | Status: SHIPPED | OUTPATIENT
Start: 2017-04-17 | End: 2017-07-17

## 2017-04-17 RX ORDER — BUPROPION HYDROCHLORIDE 100 MG/1
100 TABLET ORAL DAILY
COMMUNITY
End: 2019-02-25

## 2017-04-17 RX ORDER — MECLIZINE HYDROCHLORIDE 25 MG/1
25 TABLET ORAL 3 TIMES DAILY PRN
COMMUNITY
Start: 2017-04-13 | End: 2017-04-20

## 2017-04-17 ASSESSMENT — PAIN SCALES - GENERAL: PAINLEVEL: NO PAIN (0)

## 2017-04-17 NOTE — NURSING NOTE
"Chief Complaint   Patient presents with     Establish Care     Dizziness       Initial /60  Pulse 88  Temp 98.1  F (36.7  C) (Oral)  Wt 113 lb 11.2 oz (51.6 kg)  SpO2 96%  BMI 20.46 kg/m2 Estimated body mass index is 20.46 kg/(m^2) as calculated from the following:    Height as of 1/10/17: 5' 2.5\" (1.588 m).    Weight as of this encounter: 113 lb 11.2 oz (51.6 kg).  BP completed using cuff size: simin Olson CMA    "

## 2017-04-17 NOTE — PROGRESS NOTES
SUBJECTIVE:                                                    Cynthia Marie Barthel is a 51 year old female who presents to clinic today for the following health issues:    This is a new patient to this provider, is here to establish care.   Past medical history significant for postsurgical hypothyroidism,Patient sees endocrinologist Dr. Mueller here, last office visit with them was 3 months ago      New Patient/Transfer of Care  ED/ Followup:    Facility:  [facility] ED  Date of visit: 4/13/17  Reason for visit: Dizziness  Current Status: Follow up dizziness and MRI report. Needs referral for neurology     She is also here for Follow-up on her recent emergency room visit last Thursday for sudden onset of worsening dizziness   patient had a MRI done Which was abnormal and thus asked her to follow up with PCP and allergy.  Patient denies previous concerns for dizziness, history of hypertension,  Current concerns for tingling, numbness or weakness of extremities, chest pain, shortness of breath, syncope, palpitations,Vision or speech concerns, history of head trauma.  Patient was given prescription for meclizine in the ER which he did not help with her symptoms  Patient felt a slight improvement since this morning  Patient denies concerns for URI symptoms, ear pain,History of recurrent ear infections, history of underlying allergies  She feels dizziness is mostly positional, noticeable when she moves her head side-to-side, and by changing positions.        Problem list and histories reviewed & adjusted, as indicated.  Additional history: as documented    Patient Active Problem List   Diagnosis     Postsurgical hypothyroidism     Giant papillary conjunctivitis     History reviewed. No pertinent surgical history.    Social History   Substance Use Topics     Smoking status: Former Smoker     Types: Cigarettes     Quit date: 2/10/2014     Smokeless tobacco: Never Used     Alcohol use Yes      Comment: one  per day     Family History   Problem Relation Age of Onset     Hypertension Mother      CEREBROVASCULAR DISEASE Maternal Grandfather      Hypertension Maternal Aunt      Glaucoma Maternal Aunt      Macular Degeneration Maternal Aunt      Hypertension Maternal Uncle      Hypertension Paternal Aunt      DIABETES Paternal Uncle      Hypertension Paternal Uncle      CANCER No family hx of      Thyroid Disease No family hx of          Current Outpatient Prescriptions   Medication Sig Dispense Refill     venlafaxine (EFFEXOR-XR) 75 MG 24 hr capsule Take 75 mg by mouth daily       buPROPion (WELLBUTRIN) 100 MG tablet Take 100 mg by mouth daily       meclizine (ANTIVERT) 25 MG tablet Take 25 mg by mouth 3 times daily as needed       LORazepam (ATIVAN) 0.5 MG tablet Take 0.5-1 tablets (0.25-0.5 mg) by mouth every 8 hours as needed for anxiety or other Take 30 minutes prior to departure.  Do not operate a vehicle after taking this medication 15 tablet 0     levothyroxine (SYNTHROID/LEVOTHROID) 75 MCG tablet TAKE 1 TABLET (75 MCG) BY MOUTH DAILY 90 tablet 3     lamoTRIgine (LAMICTAL) 100 MG tablet Take 150 mg by mouth daily       tiZANidine (ZANAFLEX) 4 MG tablet Take 4 mg by mouth 3 times daily as needed for muscle spasms       tazarotene (TAZORAC) 0.05 % gel Apply a pea sized amount to the face at bedtime. 30 g 11     fluorouracil (EFUDEX) 5 % cream Use  Efudex twice daily for 2-3 weeks or until the onset of irritation. 40 g 0     vitamin E 400 UNIT capsule Take  by mouth daily.       Glucosamine-Chondroitin (GLUCOSAMINE CHONDR COMPLEX PO) Take  by mouth.       Omega-3 Fatty Acids (FISH OIL PO) Take  by mouth daily.       Multiple Vitamin CAPS Take  by mouth daily.       B Complex Vitamins (B COMPLEX 1) TABS Take  by mouth daily.       Cholecalciferol (VITAMIN D) 1000 UNIT capsule Take 1 capsule by mouth daily.       [DISCONTINUED] buPROPion (WELLBUTRIN) 75 MG tablet Take 100 mg by mouth daily        Allergies   Allergen  Reactions     Hay Fever & [A.R.M.]      Recent Labs   Lab Test  01/10/17   1044  01/06/16   0917  09/17/09   CR   --    --    --   0.61   GFRESTIMATED   --    --    --   >60   POTASSIUM   --    --    --   4.2   TSH  3.03  2.30   < >  0.09    < > = values in this interval not displayed.      BP Readings from Last 3 Encounters:   04/17/17 100/60   01/10/17 115/84   01/06/16 94/58    Wt Readings from Last 3 Encounters:   04/17/17 113 lb 11.2 oz (51.6 kg)   01/10/17 112 lb 6.4 oz (51 kg)   01/06/16 122 lb 7 oz (55.5 kg)                  Labs reviewed in EPIC    Reviewed and updated as needed this visit by clinical staff  Tobacco  Allergies  Meds  Med Hx  Surg Hx  Fam Hx  Soc Hx      Reviewed and updated as needed this visit by Provider         ROS:  C: NEGATIVE for fever, chills, change in weight  INTEGUMENTARY/SKIN: NEGATIVE for worrisome rashes, moles or lesions  EYES: NEGATIVE for vision changes or irritation  E/M: NEGATIVE for ear, mouth and throat problems  R: NEGATIVE for significant cough or SOB  CV: NEGATIVE for chest pain, palpitations or peripheral edema  GI: NEGATIVE for nausea, abdominal pain, heartburn, or change in bowel habits  MUSCULOSKELETAL: NEGATIVE for significant arthralgias or myalgia  NEURO: as above  ENDOCRINE: NEGATIVE for temperature intolerance, skin/hair changes and Hx thyroid disease  HEME/ALLERGY/IMMUNE: NEGATIVE for bleeding problems  ROS otherwise negative    OBJECTIVE:                                                    /60  Pulse 88  Temp 98.1  F (36.7  C) (Oral)  Wt 113 lb 11.2 oz (51.6 kg)  SpO2 96%  BMI 20.46 kg/m2  Body mass index is 20.46 kg/(m^2).  GENERAL: healthy, alert and no distress  EYES: Eyes grossly normal to inspection  HENT: ear canals and TM's normal, nose and mouth without ulcers or lesions  NECK: no adenopathy, no asymmetry, masses, or scars and thyroid normal to palpation  RESP: lungs clear to auscultation - no rales, rhonchi or wheezes  CV: regular  rate and rhythm, normal S1 S2, no S3 or S4, no murmur, click or rub, no peripheral edema and peripheral pulses strong  ABDOMEN: soft, nontender, no hepatosplenomegaly, no masses and bowel sounds normal  MS: no gross musculoskeletal defects noted, no edema  SKIN: no suspicious lesions or rashes  NEURO: Normal strength and tone, mentation intact and speech normal  Patient felt significantly dizzy from lying down  PSYCH: mentation appears normal, affect normal/bright    Diagnostic Test Results:  none      ASSESSMENT/PLAN:                                                            1. Dizziness  Reviewed documents and reports from care everywhere    Brain MRI-MRI BRAIN W/O CONTRAST4/13/2017  [facility]  Result Impression   IMPRESSION:   1.  Very mild scattered nonspecific supratentorial white matter T2 hyperintense foci, likely to reflect gliosis from remote vascular or inflammatory process. Migrainous changes are possible. Appearance does not suggest demyelination.  2.  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  3.  Normal remaining brain MRI.       EKG-                           Interpretive Statements  SINUS BRADYCARDIA  No previous ECG available for comparison  Electronically Signed On 4- 16:45:17 CDT by Vahe Pierce MD    - NEUROLOGY ADULT REFERRAL  Lab Results   Component Value Date    TSH 3.03 01/10/2017       . Patient had normal CBC and BMP labs on 4/13/17.  Recommended patient to start on vestibular rehabilitation therapy for benign positional vertigo, prescription given for lorazepam to use p.r.n. For severe dizziness attacks.   Reviewed MRI findings-Pineal gland cyst and gliosis-  Likely benign and do not need any further intervention, per patient's request and given her new onset of dizziness, recommended to consult neurology  Dosing and potential medication side effects discussed.  Patient verbalised understanding and  is agreeable to the plan.    - LORazepam (ATIVAN) 0.5 MG tablet; Take 0.5-1 tablets (0.25-0.5 mg) by mouth every 8 hours as needed for anxiety or other Take 30 minutes prior to departure.  Do not operate a vehicle after taking this medication  Dispense: 15 tablet; Refill: 0  - PHYSICAL THERAPY REFERRAL    2. BPV (benign positional vertigo), bilateral  as above    - NEUROLOGY ADULT REFERRAL  - LORazepam (ATIVAN) 0.5 MG tablet; Take 0.5-1 tablets (0.25-0.5 mg) by mouth every 8 hours as needed for anxiety or other Take 30 minutes prior to departure.  Do not operate a vehicle after taking this medication  Dispense: 15 tablet; Refill: 0  - PHYSICAL THERAPY REFERRAL    3. Postsurgical hypothyroidism  Lab Results   Component Value Date    TSH 3.03 01/10/2017         4. Cyst of pineal gland  as above      - NEUROLOGY ADULT REFERRAL    5. Cerebral gliosis  as above    - NEUROLOGY ADULT REFERRAL    Chart documentation done in part with Dragon Voice recognition Software. Although reviewed after completion, some word and grammatical error may remain.  Patient's last physical with previous PCP at [facility] physician-9/2016   will follow-up with this provider for physical this September or sooner if needed  See Patient Instructions    Ramakrishna Boyer MD  [facility]

## 2017-04-17 NOTE — MR AVS SNAPSHOT
After Visit Summary   4/17/2017    Cynthia Marie Barthel    MRN: [id number]           Patient Information     Date Of Birth          1965        Visit Information        Provider Department      4/17/2017 3:00 PM Ramakrishna Boyer MD [facility]        Today's Diagnoses     Dizziness    -  1    BPV (benign positional vertigo), bilateral        Postsurgical hypothyroidism        Cyst of pineal gland        Cerebral gliosis          Care Instructions    Take lorazepam as needed for anxiety/dizziness     Schedule for neurology consult   Call [phone] to schedule for physical therapy for vestibular rehab          Follow-ups after your visit        Additional Services     NEUROLOGY ADULT REFERRAL       Your provider has referred you to: [facility]: [facility] [phone]   [email or web address]    Reason for Referral: Consult    Please be aware that coverage of these services is subject to the terms and limitations of your health insurance plan.  Call member services at your health plan with any benefit or coverage questions.      Please bring the following with you to your appointment:    (1) Any X-Rays, CTs or MRIs which have been performed.  Contact the facility where they were done to arrange for  prior to your scheduled appointment.    (2) List of current medications  (3) This referral request   (4) Any documents/labs given to you for this referral            PHYSICAL THERAPY REFERRAL       *This order will print in the [facility] Central Scheduling Office*    [facility] provides Physical Therapy evaluation and treatment and many specialty services across the [facility] system.  If requesting a specialty program, please choose from the list below.    Call one number to schedule at any [facility] location   [phone]  [phone].    Treatment: Evaluation & Treatment  Special Instructions/Modalities: vestibular rehab  Special Programs: Balance/Vestibular    Please be aware that coverage of these services is subject to the terms and limitations of your health insurance plan.  Call member services at your health plan with any benefit or coverage questions.      **Note to Provider** To refer patients to therapy outside of the location list, change the order class to External Referral in the order composer.                  Your next 10 appointments already scheduled     May 03, 2017  9:00 AM CDT   Return Visit with Hi Daily OD   [facility])    [address] [place] [address]   [phone]            May 30, 2017 12:15 PM CDT   Return Visit with Dariana Forde MD   [facility])    [address] [place] [address]   [phone]            Feb 06, 2018 11:00 AM CST   LAB with LAB FIRST FLOOR [facility])    [address] [place] [address]   [phone]           Patient must bring picture ID.  Patient should be prepared to give a urine specimen  Please do not eat 10-12 hours before your appointment if you are coming in fasting for labs on lipids, cholesterol, or glucose (sugar).  Pregnant women should follow their Care Team instructions. Water with medications is okay. Do not drink coffee or other fluids.   If you have concerns about taking  your medications, please ask at office or if scheduling via [facility], send a message by clicking on Secure Messaging, Message Your Care Team.            Feb 06, 2018 11:30 AM CST   Return Visit with Gabriela Muir MD   [facility])    [address] [place] [address]   [phone]              Who to contact     If you have questions or  need follow up information about today's clinic visit or your schedule please contact [facility] directly at [phone].  Normal or non-critical lab and imaging results will be communicated to you by Smartsheethart, letter or phone within 4 business days after the clinic has received the results. If you do not hear from us within 7 days, please contact the clinic through Smartsheethart or phone. If you have a critical or abnormal lab result, we will notify you by phone as soon as possible.  Submit refill requests through Klocwork or call your pharmacy and they will forward the refill request to us. Please allow 3 business days for your refill to be completed.          Additional Information About Your Visit        Klocwork Information     Klocwork gives you secure access to your electronic health record. If you see a primary care provider, you can also send messages to your care team and make appointments. If you have questions, please call your primary care clinic.  If you do not have a primary care provider, please call [phone] and they will assist you.      Klocwork is an electronic gateway that provides easy, online access to your medical records. With Klocwork, you can request a clinic appointment, read your test results, renew a prescription or communicate with your care team.     To access your existing account, please contact your [facility] or call [phone] for assistance.        Care EveryWhere ID     This is your Care EveryWhere ID. This could be used by other organizations to access your [place] medical records  [id number]        Your Vitals Were     Pulse Temperature Pulse Oximetry BMI (Body Mass Index)          88 98.1  F (36.7  C) (Oral) 96% 20.46 kg/m2         Blood Pressure from Last 3 Encounters:   04/17/17 100/60   01/10/17 115/84   01/06/16 94/58    Weight from Last 3 Encounters:   04/17/17 113 lb 11.2 oz (51.6 kg)   01/10/17 112 lb 6.4 oz (51 kg)    01/06/16 122 lb 7 oz (55.5 kg)              We Performed the Following     NEUROLOGY ADULT REFERRAL     PHYSICAL THERAPY REFERRAL          Today's Medication Changes          These changes are accurate as of: 4/17/17  3:30 PM.  If you have any questions, ask your nurse or doctor.               Start taking these medicines.        Dose/Directions    LORazepam 0.5 MG tablet   Commonly known as:  ATIVAN   Used for:  Dizziness, BPV (benign positional vertigo), bilateral   Started by:  Ramakrishna Boyer MD        Dose:  0.25-0.5 mg   Take 0.5-1 tablets (0.25-0.5 mg) by mouth every 8 hours as needed for anxiety or other Take 30 minutes prior to departure.  Do not operate a vehicle after taking this medication   Quantity:  15 tablet   Refills:  0            Where to get your medicines      Some of these will need a paper prescription and others can be bought over the counter.  Ask your nurse if you have questions.     Bring a paper prescription for each of these medications     LORazepam 0.5 MG tablet                Primary Care Provider Office Phone # Fax #    Ramakrishna Boyer [phone]       [facility] [address]  [facility] [address]        Thank you!     Thank you for choosing [facility]  for your care. Our goal is always to provide you with excellent care. Hearing back from our patients is one way we can continue to improve our services. Please take a few minutes to complete the written survey that you may receive in the mail after your visit with us. Thank you!             Your Updated Medication List - Protect others around you: Learn how to safely use, store and throw away your medicines at www.disposemymeds.org.          This list is accurate as of: 4/17/17  3:30 PM.  Always use your most recent med list.                   Brand Name Dispense Instructions for use    B COMPLEX 1 Tabs      Take  by mouth daily.       buPROPion 100 MG tablet    WELLBUTRIN      Take 100 mg by mouth daily       FISH OIL PO      Take  by mouth daily.       fluorouracil 5 % cream    EFUDEX    40 g    Use  Efudex twice daily for 2-3 weeks or until the onset of irritation.       GLUCOSAMINE CHONDR COMPLEX PO      Take  by mouth.       lamoTRIgine 100 MG tablet    LaMICtal     Take 150 mg by mouth daily       levothyroxine 75 MCG tablet    SYNTHROID/LEVOTHROID    90 tablet    TAKE 1 TABLET (75 MCG) BY MOUTH DAILY       LORazepam 0.5 MG tablet    ATIVAN    15 tablet    Take 0.5-1 tablets (0.25-0.5 mg) by mouth every 8 hours as needed for anxiety or other Take 30 minutes prior to departure.  Do not operate a vehicle after taking this medication       meclizine 25 MG tablet    ANTIVERT     Take 25 mg by mouth 3 times daily as needed       Multiple vitamin Caps      Take  by mouth daily.       tazarotene 0.05 % topical gel    TAZORAC    30 g    Apply a pea sized amount to the face at bedtime.       tiZANidine 4 MG tablet    ZANAFLEX     Take 4 mg by mouth 3 times daily as needed for muscle spasms       venlafaxine 75 MG 24 hr capsule    EFFEXOR-XR     Take 75 mg by mouth daily       vitamin D 1000 UNITS capsule      Take 1 capsule by mouth daily.       vitamin E 400 UNIT capsule      Take  by mouth daily.

## 2017-04-17 NOTE — PATIENT INSTRUCTIONS
Take lorazepam as needed for anxiety/dizziness     Schedule for neurology consult   Call [phone] to schedule for physical therapy for vestibular rehab

## 2017-04-19 ENCOUNTER — HOSPITAL ENCOUNTER (OUTPATIENT)
Dept: PHYSICAL THERAPY | Facility: CLINIC | Age: 52
Setting detail: THERAPIES SERIES
End: 2017-04-19
Attending: FAMILY MEDICINE
Payer: COMMERCIAL

## 2017-04-19 PROCEDURE — 40000840 ZZHC STATISTIC PT VESTIBULAR VISIT: Performed by: PHYSICAL THERAPIST

## 2017-04-19 PROCEDURE — 95992 CANALITH REPOSITIONING PROC: CPT | Mod: GP | Performed by: PHYSICAL THERAPIST

## 2017-04-19 PROCEDURE — 97112 NEUROMUSCULAR REEDUCATION: CPT | Mod: GP | Performed by: PHYSICAL THERAPIST

## 2017-04-19 PROCEDURE — 97161 PT EVAL LOW COMPLEX 20 MIN: CPT | Mod: GP | Performed by: PHYSICAL THERAPIST

## 2017-04-20 NOTE — PROGRESS NOTES
04/19/17 1300   Quick Adds   Quick Adds Vestibular Eval   General Information   Start of Care Date 04/19/17   Referring Physician Ramakrishna Boyer MD   Orders Evaluate and Treat as Indicated   Order Date 04/17/17   Medical Diagnosis Dizziness, BPV   Onset of illness/injury or Date of Surgery 04/09/17   Surgical/Medical history reviewed Yes   Pertinent history of current vestibular problem (include personal factors and/or comorbidities that impact the POC)  Migraines  (She ahs maybe had 1-2 migraines, mostly bad hedaches.)   Pertinent history of current problem (include personal factors and/or comorbidities that impact the POC) Onset of dizziness and imparied balance a week ago Sunday with going to the ER last Thursday with negative for CVI.  The episodes last 10-20 seconds and are very instense.  Denies recent viral illness or change in hearing.  Reports dizziness is linked to position changes of bending down or rolling in bed.  She does not feel it is more to the left or right.  She was prescribed meclazine, but has not been taking it and has not today.  Her balance has been feeling off and notes that her trunk and sways when she is dizzy.   Pertinent Visual History  No visual history reported   Patient role/Employment history Employed  (FT )   [facility]   Home/Community Accessibility Comments Stairs needing railing going down stairs for safety.   Patient/Family Goals Statement Resolve dizziness.   Functional Scales   Functional Scales and Outcomes Dizziness Handicap Inventory=52/100   Pain   Patient currently in pain Denies   Cognitive Status Examination   Orientation orientation to person, place and time   Level of Consciousness alert   Follows Commands and Answers Questions 100% of the time;able to follow multistep instructions   Personal Safety and Judgment intact   Memory intact   Posture   Posture Comments Guarded posture with stiffness through trunk with dizziness  episodes.   Gait   Gait Comments Path deviation is not observed but she reports difficulty with balance during episodes.   Balance   Balance other (describe)   Balance Comments Defecits are observed during an episode of dizziness.   Cervicogenic Screen   Neck ROM WFLs   Vertebral Artery Test Normal   Oculomotor Exam   Smooth Pursuit Normal   Saccades Normal   VOR Normal   VOR Cancellation Normal   Infrared Goggle Exam or Frenzel Lense Exam   Vestibular Suppressant in Last 24 Hours? No   Spontaneous Nystagmus Negative   Gaze Evoked Nystagmus Negative   Head Shake Horizontal Nystagmus Negative   [place]-Hallpike (right) Upbeating;Upbeating L torsional   [place]-Hallpike (right) comments Lasting 7 seconds.  Also demonstrated nystagmus with returning to sit for 10 seocnds.  No nausea   [place]-Hallpike (Left) Negative   HSCC Supine Roll Test (Right) Negative   HSCC Supine Roll Test (Left) Negative   Dynamic Visual Acuity (DVA)   Static Acuity (LogMar) .1   Horizontal Head Movement at 1 Hz (LogMar) .1   Planned Therapy Interventions   Planned Therapy Interventions neuromuscular re-education   Clinical Impression   Criteria for Skilled Therapeutic Interventions Met yes, treatment indicated   PT Diagnosis Vertigo   Influenced by the following impairments Dizziness with position changes, impaired balance, fogginess, limited activity and fatigue due to dizziness   Functional limitations due to impairments Decreased activity toelrance due to dizziness, path deviaiton with gait, incresaed safety concerns on stairs.   Clinical Presentation Stable/Uncomplicated   Clinical Presentation Rationale Episodes continue, but no other medicla history.   Clinical Decision Making (Complexity) Low complexity   Therapy Frequency 1 time/week   Predicted Duration of Therapy Intervention (days/wks) 6 weeks   Risk & Benefits of therapy have been explained Yes   Patient, Family & other staff in agreement with plan of care Yes   Clinical Impression Comments  Negar was treated today for canalith repositioning for right positional vertigo based on today's assessment.  She did have mild vertigo at end of treatment, so will likely need the additional visits.   Education Assessment   Preferred Learning Style Listening   GOALS   PT Eval Goals 1;2   Goal 1   Goal Identifier DHI   Goal Description Negar will score <20/100 on the Dizziness Handicap Inventory demonstrating a sginifcant improvement in overall symtpoms.   Target Date 06/01/17   Goal 2   Goal Identifier Rolling and bending   Goal Description Negar will be able to bend to  object from floor and roll in bed with no episodes of vertigo for one week.   Target Date 06/01/17   Total Evaluation Time   Total Evaluation Time (Minutes) 30

## 2017-04-25 ENCOUNTER — HOSPITAL ENCOUNTER (OUTPATIENT)
Dept: PHYSICAL THERAPY | Facility: CLINIC | Age: 52
Setting detail: THERAPIES SERIES
End: 2017-04-25
Attending: FAMILY MEDICINE
Payer: COMMERCIAL

## 2017-04-25 PROCEDURE — 97112 NEUROMUSCULAR REEDUCATION: CPT | Mod: GP | Performed by: PHYSICAL THERAPIST

## 2017-04-25 PROCEDURE — 40000840 ZZHC STATISTIC PT VESTIBULAR VISIT: Performed by: PHYSICAL THERAPIST

## 2017-05-03 ENCOUNTER — OFFICE VISIT (OUTPATIENT)
Dept: OPTOMETRY | Facility: CLINIC | Age: 52
End: 2017-05-03
Payer: COMMERCIAL

## 2017-05-03 DIAGNOSIS — H52.13 MYOPIA, BILATERAL: ICD-10-CM

## 2017-05-03 DIAGNOSIS — H52.4 PRESBYOPIA: ICD-10-CM

## 2017-05-03 PROCEDURE — 92014 COMPRE OPH EXAM EST PT 1/>: CPT | Performed by: OPTOMETRIST

## 2017-05-03 PROCEDURE — 92015 DETERMINE REFRACTIVE STATE: CPT | Performed by: OPTOMETRIST

## 2017-05-03 ASSESSMENT — REFRACTION_CURRENTRX
OD_BRAND: ACUVUE MOIST
OD_BASECURVE: 8.40
OD_DIAMETER: 14.2
OD_SPHERE: -3.00
OD_DIAMETER: 13.8
OS_BRAND: AIR OPTIX NIGHT AND DAY
OS_DIAMETER: 14.2
OS_BRAND: J&J ACUVUE OASYS BC 8.4, D 14.0
OD_BRAND: J&J ACUVUE OASYS BC 8.4, D 14.0
OD_SPHERE: -3.00
OS_DIAMETER: 13.8
OS_SPHERE: -4.00
OD_SPHERE: -3.00
OS_BASECURVE: 8.5
OS_SPHERE: -4.00
OD_BASECURVE: 8.5
OS_SPHERE: -4.00
OD_BRAND: AIR OPTIX NIGHT AND DAY
OS_BASECURVE: 8.40
OS_BRAND: ACUVUE MOIST

## 2017-05-03 ASSESSMENT — REFRACTION_MANIFEST
OD_ADD: +2.00
OD_SPHERE: -5.00
OD_CYLINDER: +0.50
OS_CYLINDER: SPHERE
OS_SPHERE: -4.25
OS_ADD: +2.00
OD_AXIS: 005

## 2017-05-03 ASSESSMENT — REFRACTION_WEARINGRX
OS_CYLINDER: +0.25
OS_AXIS: 150
OD_AXIS: 008
OS_SPHERE: -4.50
OS_ADD: +2.00
OD_ADD: +2.00
OD_SPHERE: -5.25
OD_CYLINDER: +0.50

## 2017-05-03 ASSESSMENT — VISUAL ACUITY
METHOD: SNELLEN - LINEAR
OS_CC+: -1
OD_CC: 20/150
CORRECTION_TYPE: CONTACTS
OS_CC: 20/15

## 2017-05-03 ASSESSMENT — TONOMETRY
IOP_METHOD: TONOPEN
OD_IOP_MMHG: 18
OS_IOP_MMHG: 17

## 2017-05-03 ASSESSMENT — CONF VISUAL FIELD
OD_NORMAL: 1
OS_NORMAL: 1

## 2017-05-03 ASSESSMENT — CUP TO DISC RATIO
OS_RATIO: 0.2
OD_RATIO: 0.25

## 2017-05-03 ASSESSMENT — EXTERNAL EXAM - RIGHT EYE: OD_EXAM: NORMAL

## 2017-05-03 ASSESSMENT — EXTERNAL EXAM - LEFT EYE: OS_EXAM: NORMAL

## 2017-05-03 NOTE — MR AVS SNAPSHOT
After Visit Summary   5/3/2017    Cynthia Marie Barthel    MRN: [id number]           Patient Information     Date Of Birth          1965        Visit Information        Provider Department      5/3/2017 9:00 AM iH Daily OD [facility]        Today's Diagnoses     Myopia, bilateral        Presbyopia          Care Instructions    Eyeglass prescription given.    Dispensed trial contacts.  Return in 1 week for a contact lens check.  Be sure to wear contact lenses in to that appointment.    Recommend returning for dilated fundus exam. It is a more thorough exam of the retina.    Return in 1 year for a complete eye exam or sooner if needed.    Hi Daily OD          Follow-ups after your visit        Follow-up notes from your care team     Return in about 1 week (around 5/10/2017) for Follow Up.      Your next 10 appointments already scheduled     May 04, 2017  3:00 PM CDT   Treatment 45 with Carol Redd PT   [facility] ([facility])    [address] [facility] [address]   [phone]            May 17, 2017  3:00 PM CDT   Treatment 45 with Carol Redd [facility] ([facility])    [address] [facility] [address]   [phone]            May 30, 2017 12:15 PM CDT   Return Visit with Dariana Forde MD   [facility])    [address] [facility] [address]   [phone]            Jun 07, 2017  9:00 AM CDT   New Visit with Malik Castellano MD   [facility])    [address] [facility] [address]   [phone]            Feb 06, 2018 11:00 AM CST   LAB with LAB FIRST FLOOR [facility])    [address] [facility] [address]   [phone]           Patient must bring picture ID.  Patient should be  prepared to give a urine specimen  Please do not eat 10-12 hours before your appointment if you are coming in fasting for labs on lipids, cholesterol, or glucose (sugar).  Pregnant women should follow their Care Team instructions. Water with medications is okay. Do not drink coffee or other fluids.   If you have concerns about taking  your medications, please ask at office or if scheduling via SimpleSite, send a message by clicking on Secure Messaging, Message Your Care Team.            Feb 06, 2018 11:30 AM CST   Return [facility] with Gabri[facility]    [id number] [place] [address] [phone]              Who to contact     If you have questions or need follow up information about today's clinic visit or your schedule please contact [facility] directly at [phone].  Normal or non-critical lab and imaging results will be communicated to you by [employer], letter or phone within 4 business days after the clinic has received the results. If you do not hear from us within 7 days, please contact the clinic through BloomReacht or phone. If you have a critical or abnormal lab result, we will notify you by phone as soon as possible.  Submit refill requests through SimpleSite or call your pharmacy and they will forward the refill request to us. Please allow 3 business days for your refill to be completed.          Additional Information About Your Visit        [employer] Information     SimpleSite gives you secure access to your electronic health record. If you see a primary care provider, you can also send messages to your care team and make appointments. If you have questions, please call your primary care clinic.  If you do not have a primary care provider, please call [phone] and they will assist you.      SimpleSite is an electronic gateway that provides easy, online access to your medical records. With SimpleSite, you can  request a clinic appointment, read your test results, renew a prescription or communicate with your care team.     To access your existing account, please contact your [facility] or call [phone] for assistance.        Care EveryWhere ID     This is your Care EveryWhere ID. This could be used by other organizations to access your [place] medical records  [id number]         Blood Pressure from Last 3 Encounters:   04/17/17 100/60   01/10/17 115/84   01/06/16 94/58    Weight from Last 3 Encounters:   04/17/17 51.6 kg (113 lb 11.2 oz)   01/10/17 51 kg (112 lb 6.4 oz)   01/06/16 55.5 kg (122 lb 7 oz)              We Performed the Following     EYE EXAM (SIMPLE-NONBILLABLE)     REFRACTION        Primary Care Provider Office Phone # Fax #    Ramakrishna Boyer [phone]       [facility] [address] [facility] [address]        Thank you!     Thank you for choosing [facility]  for your care. Our goal is always to provide you with excellent care. Hearing back from our patients is one way we can continue to improve our services. Please take a few minutes to complete the written survey that you may receive in the mail after your visit with us. Thank you!             Your Updated Medication List - Protect others around you: Learn how to safely use, store and throw away your medicines at www.disposemymeds.org.          This list is accurate as of: 5/3/17 10:09 AM.  Always use your most recent med list.                   Brand Name Dispense Instructions for use    B COMPLEX 1 Tabs      Take  by mouth daily.       buPROPion 100 MG tablet    WELLBUTRIN     Take 100 mg by mouth daily       FISH OIL PO      Take  by mouth daily.       fluorouracil 5 % cream    EFUDEX    40 g    Use  Efudex twice daily for 2-3 weeks or until the onset of irritation.       GLUCOSAMINE CHONDR COMPLEX PO      Take  by mouth.       lamoTRIgine 100 MG tablet     LaMICtal     Take 150 mg by mouth daily       levothyroxine 75 MCG tablet    SYNTHROID/LEVOTHROID    90 tablet    TAKE 1 TABLET (75 MCG) BY MOUTH DAILY       LORazepam 0.5 MG tablet    ATIVAN    15 tablet    Take 0.5-1 tablets (0.25-0.5 mg) by mouth every 8 hours as needed for anxiety or other Take 30 minutes prior to departure.  Do not operate a vehicle after taking this medication       Multiple vitamin Caps      Take  by mouth daily.       tazarotene 0.05 % topical gel    TAZORAC    30 g    Apply a pea sized amount to the face at bedtime.       tiZANidine 4 MG tablet    ZANAFLEX     Take 4 mg by mouth 3 times daily as needed for muscle spasms       venlafaxine 75 MG 24 hr capsule    EFFEXOR-XR     Take 75 mg by mouth daily       vitamin D 1000 UNITS capsule      Take 1 capsule by mouth daily.       vitamin E 400 UNIT capsule      Take  by mouth daily.

## 2017-05-03 NOTE — NURSING NOTE
Patient presents with:  COMPREHENSIVE EYE EXAM      Referring Provider:  No referring provider defined for this encounter.    HPI    Last Eye Exam:  1/13/16   Informant(s):  EMR   Symptoms:           Do you have eye pain now?:  No      Comments:  NO VA concerns.  Has had issue with vertigo recently.  Currently wearing DW CL and would like to be fitted with 1 week wear CL.

## 2017-05-03 NOTE — PATIENT INSTRUCTIONS
Eyeglass prescription given.    Dispensed trial contacts.  Return in 1 week for a contact lens check.  Be sure to wear contact lenses in to that appointment.    Recommend returning for dilated fundus exam. It is a more thorough exam of the retina.    Return in 1 year for a complete eye exam or sooner if needed.    Hi Daily, OD

## 2017-05-03 NOTE — PROGRESS NOTES
Chief Complaint   Patient presents with     COMPREHENSIVE EYE EXAM       HPI    Last Eye Exam:  1/13/16   Informant(s):  EMR   Symptoms:           Do you have eye pain now?:  No      Comments:  NO VA concerns.  Has had issue with vertigo recently.  Currently wearing DW CL and would like to be fitted with 1 week wear CL.             Medical, surgical and family histories reviewed and updated 5/3/2017.       OBJECTIVE: See Ophthalmology exam    ASSESSMENT:    ICD-10-CM    1. Myopia, bilateral H52.13 EYE EXAM (SIMPLE-NONBILLABLE)     REFRACTION   2. Presbyopia H52.4 EYE EXAM (SIMPLE-NONBILLABLE)     REFRACTION    Is scheduled with a neurologist.    PLAN:     Patient Instructions   Eyeglass prescription given.    Dispensed trial contacts.  Return in 1 week for a contact lens check.  Be sure to wear contact lenses in to that appointment.    Recommend returning for dilated fundus exam. It is a more thorough exam of the retina.    Return in 1 year for a complete eye exam or sooner if needed.    Hi Daily, OD

## 2017-05-04 ENCOUNTER — TELEPHONE (OUTPATIENT)
Dept: NEUROLOGY | Facility: CLINIC | Age: 52
End: 2017-05-04

## 2017-05-04 ENCOUNTER — HOSPITAL ENCOUNTER (OUTPATIENT)
Dept: PHYSICAL THERAPY | Facility: CLINIC | Age: 52
Setting detail: THERAPIES SERIES
End: 2017-05-04
Attending: FAMILY MEDICINE
Payer: COMMERCIAL

## 2017-05-04 PROCEDURE — 97112 NEUROMUSCULAR REEDUCATION: CPT | Mod: GP | Performed by: PHYSICAL THERAPIST

## 2017-05-04 PROCEDURE — 40000840 ZZHC STATISTIC PT VESTIBULAR VISIT: Performed by: PHYSICAL THERAPIST

## 2017-05-04 NOTE — TELEPHONE ENCOUNTER
[facility] Call Center    Phone Message    Name of Caller: CYNTHIA BARTHEL    Phone Number: Cell number on file:    Telephone Information:   Mobile [phone]       Best time to return call: TODAY.     May a detailed message be left on voicemail: yes    Relation to patient: Self    Reason for Call: Other: Pt has a NP appt on 6.7.17 with Dr Castellano.  Pt called today and stated she had a episode that was very severe regarding dizziness and vertigo.  She is asking for a call back regarding symptoms and if she can get in sooner.  Thanks.      Action Taken: Message routed to:  Adult Clinics: Neurology p [id number]

## 2017-05-04 NOTE — TELEPHONE ENCOUNTER
Writer called the pt to let her know that there is not a sooner appt here in [place] prior to 6/7/17. There was no answer but a detailed message was left on her VM. She was advised to notify her PCP regarding her symptoms and we will make sure she is on the wait list for a sooner appt if someone cancels.  Message routed to Dr Boyer to update.   Elin Canela RN

## 2017-05-05 ENCOUNTER — TELEPHONE (OUTPATIENT)
Dept: PEDIATRICS | Facility: CLINIC | Age: 52
End: 2017-05-05

## 2017-05-05 NOTE — TELEPHONE ENCOUNTER
The pt is now scheduled with Dr Rizvi on 5/23/17. She is still on the wait list in case there is another cancellation for a sooner date.  Elin Canela RN

## 2017-05-05 NOTE — TELEPHONE ENCOUNTER
Staff, can you reach out to the neurology team to see if this patient can be seen sooner than the 6/7 appointment.

## 2017-05-05 NOTE — TELEPHONE ENCOUNTER
I spoke with Neurology and advised them of the information below and getting patient in for an appt sooner than 06/14/17 with Dr. Redd.  They advised me they have Johanne on a cancellation waiting list.  Patient has been advised of this. They just had a recent cancellation on 05/23/17 so they will see if they can make that work and they will let us know.    Berta Freeman CMA    Routed to Dr. Boyer

## 2017-05-05 NOTE — TELEPHONE ENCOUNTER
----- Message from Carol Redd, PT sent at 5/4/2017  3:42 PM CDT -----  Regarding: PT update  Hi Dr. Boyer,    I saw Shelley today for a PT follow up.  At her initial eval on 4/19, she was demonstrating positional vertical which was treated on the right side and appears to have resolved.  She has had no further symptoms of vertigo.  On Tuesday, she had an episode that she describes as dizziness but not room spinning vertigo.  This lasted approximately 6 hours and she didn't feel the Meclizine helped.  She said it was as significant as her initial episode that brought her to the ER.  Her last two visits indicate the positional vertigo has resolved, but she is continuing to have ongoing symptoms that are not related to position.  I know she has her neuro consult coming up in June, but she may benefit from a full vestibular work up at the [facility].  I'm not sure if you want to wait until after the neuro consult, but I did want you to know where she was at.  I did not schedule anything more with her until after her neuro consult.  I told her your would communicate with her if you wanted her to follow up with anyone before the neuro consult.    Happy to discuss anything further.    Thanks so much,  Carol Redd, PT  [phone]

## 2017-05-10 ENCOUNTER — OFFICE VISIT (OUTPATIENT)
Dept: OPTOMETRY | Facility: CLINIC | Age: 52
End: 2017-05-10
Payer: COMMERCIAL

## 2017-05-10 DIAGNOSIS — H52.13 MYOPIA, BILATERAL: ICD-10-CM

## 2017-05-10 DIAGNOSIS — H52.4 PRESBYOPIA: ICD-10-CM

## 2017-05-10 PROCEDURE — 99207 ZZC NO BILLABLE SERVICE THIS VISIT: CPT | Performed by: OPTOMETRIST

## 2017-05-10 PROCEDURE — 92499 UNLISTED OPH SVC/PROCEDURE: CPT | Performed by: OPTOMETRIST

## 2017-05-10 ASSESSMENT — TONOMETRY
OS_IOP_MMHG: 14
OD_IOP_MMHG: 13
IOP_METHOD: TONOPEN

## 2017-05-10 ASSESSMENT — REFRACTION_CURRENTRX
OD_BRAND: J&J ACUVUE OASYS BC 8.4, D 14.0
OD_SPHERE: -3.00
OS_SPHERE: -4.00
OS_BRAND: J&J ACUVUE OASYS BC 8.4, D 14.0

## 2017-05-10 ASSESSMENT — EXTERNAL EXAM - RIGHT EYE: OD_EXAM: NORMAL

## 2017-05-10 ASSESSMENT — VISUAL ACUITY
OD_CC+: -1
OS_CC: 20/20
CORRECTION_TYPE: CONTACTS
OD_CC: 20/70
METHOD: SNELLEN - LINEAR

## 2017-05-10 ASSESSMENT — EXTERNAL EXAM - LEFT EYE: OS_EXAM: NORMAL

## 2017-05-10 ASSESSMENT — CUP TO DISC RATIO
OD_RATIO: 0.25
OS_RATIO: 0.2

## 2017-05-10 NOTE — PATIENT INSTRUCTIONS
Contact lens prescription given.    You can order your contact lenses online at [email or web address].  Click on services at the top of the page, then eye care and you will see the link to order contact lenses.  You can also order contact lenses at [phone].  Be sure to let them know which office you would like to  the lenses, [place] or [place].      Return in 1 year for a complete eye exam or sooner if needed.    Hi Daily, OD    The affects of the dilating drops last for 4- 6 hours.  You will be more sensitive to light and vision will be blurry up close.  Mydriatic sunglasses were given if needed.      Optometry Providers       Clinic Locations                                 Telephone Number   Dr. Patricia Daily   [facility]   [place] and [place]  [phone]     [place] [facility] Hours:                [place] [facility] Hours:       [place] [facility] Hours:  [address] [facility] [address]   [address]      [address] [place] [address]   [place] [address]    [place] [address]  Phone: [phone]                    Phone [phone]                      Phone: [phone]                          Monday 8:00-7:00                          Monday 8:00-7:00                          Monday 8:00-7:00              Tuesday 8:00-6:00                          Tuesday 8:00-7:00                          Tuesday 8:00-7:00              Wednesday 8:00-6:00                  Wednesday 8:00-7:00                   Wednesday 8:00-7:00      Thursday 8:00-6:00                        Thursday 8:00-7:00                         Thursday 8:00-7:00            Friday 8:00-5:00                              Friday 8:00-5:00                              Friday 8:00-5:00    Please log on to [email or web address] to order your contact lenses.  The link is found on the Eye Care and Vision Services page.  As always, Thank you for trusting  us with your health care needs!

## 2017-05-10 NOTE — MR AVS SNAPSHOT
After Visit Summary   5/10/2017    Cynthia Marie Barthel    MRN: [id number]           Patient Information     Date Of Birth          1965        Visit Information        Provider Department      5/10/2017 4:30 PM Hi Daily OD [facility]        Today's Diagnoses     Myopia, bilateral        Presbyopia          Care Instructions    Contact lens prescription given.    You can order your contact lenses online at [email or web address].  Click on services at the top of the page, then eye care and you will see the link to order contact lenses.  You can also order contact lenses at [phone].  Be sure to let them know which office you would like to  the lenses, [place] or [place].      Return in 1 year for a complete eye exam or sooner if needed.    Hi Daily OD    The affects of the dilating drops last for 4- 6 hours.  You will be more sensitive to light and vision will be blurry up close.  Mydriatic sunglasses were given if needed.      Optometry Providers       Clinic Locations                                 Telephone Number   Dr. Patricia Daily   [facility]  [phone]     [place] [facility] Hours:                [place] [facility] Hours:       [place] [facility] Hours:  [address]   [address]     [address] [place] [address]   [place] [address]    [place] [address]  Phone: [phone]                    Phone [phone]                      Phone: [phone]                          Monday 8:00-7:00                          Monday 8:00-7:00                          Monday 8:00-7:00              Tuesday 8:00-6:00                          Tuesday 8:00-7:00                          Tuesday 8:00-7:00              Wednesday 8:00-6:00                  Wednesday 8:00-7:00                   Wednesday 8:00-7:00      Thursday  8:00-6:00                        Thursday 8:00-7:00                         Thursday 8:00-7:00            Friday 8:00-5:00                              Friday 8:00-5:00                              Friday 8:00-5:00    Please log on to [email or web address] to order your contact lenses.  The link is found on the Eye Care and Vision Services page.  As always, Thank you for trusting us with your health care needs!            Follow-ups after your visit        Follow-up notes from your care team     Return in about 1 year (around 5/10/2018) for Annual Visit.      Your next 10 appointments already scheduled     May 16, 2017  8:30 AM CDT   New Visit with Ortiz [facility]   [facility]    [address] [place] [address]   [phone]            May 30, 2017 12:15 PM CDT   Return Visit with Dariana Forde MD   [facility]    [address] [place] [address]   [phone]            Jun 14, 2017  4:15 PM CDT   Vestibular Treatment with Carol Redd [facility] ([facility])    [address] [place] [address]   [phone]            Feb 06, 2018 11:00 AM CST   LAB with LAB FIRST FLOOR [facility]    [address] [place] [address]   [phone]           Patient must bring picture ID.  Patient should be prepared to give a urine specimen  Please do not eat 10-12 hours before your appointment if you are coming in fasting for labs on lipids, cholesterol, or glucose (sugar).  Pregnant women should follow their Care Team instructions. Water with medications is okay. Do not drink coffee or other fluids.   If you have concerns about taking  your medications, please ask at office or if scheduling via [employer] Communications[employer], send a message by clicking on Secure Messaging, Message Your Care Team.             Feb 06, 2018 11:30 AM CST   Return Visit with Gabriela Muir MD   [facility] ([facility])    [address] [place] [address] [phone]              Who to contact     If you have questions or need follow up information about today's clinic visit or your schedule please contact [facility] directly at [phone].  Normal or non-critical lab and imaging results will be communicated to you by Brightkitehart, letter or phone within 4 business days after the clinic has received the results. If you do not hear from us within 7 days, please contact the clinic through Brightkitehart or phone. If you have a critical or abnormal lab result, we will notify you by phone as soon as possible.  Submit refill requests through Le[employer] Train or call your pharmacy and they will forward the refill request to us. Please allow 3 business days for your refill to be completed.          Additional Information About Your Visit        Lean Train Information     [employer] Train gives you secure access to your electronic health record. If you see a primary care provider, you can also send messages to your care team and make appointments. If you have questions, please call your primary care clinic.  If you do not have a primary care provider, please call [phone] and they will assist you.      Lean Train is an electronic gateway that provides easy, online access to your medical records. With Lean Train, you can request a clinic appointment, read your test results, renew a prescription or communicate with your care team.     To access your existing account, please contact your [facility] or call [phone] for assistance.        Care EveryWhere ID     This is your Care EveryWhere ID. This could be used by other organizations to access your [place] medical records  [id number]         Blood Pressure from Last 3 Encounters:   04/17/17 100/60   01/10/17  115/84   01/06/16 94/58    Weight from Last 3 Encounters:   04/17/17 51.6 kg (113 lb 11.2 oz)   01/10/17 51 kg (112 lb 6.4 oz)   01/06/16 55.5 kg (122 lb 7 oz)              We Performed the Following     CONTACT LENS CHECK        Primary Care Provider Office Phone # Fax #    Ramakrishna Boyer [phone]       [facility] [address]  [facility] [address]        Thank you!     Thank you for choosing [facility]  for your care. Our goal is always to provide you with excellent care. Hearing back from our patients is one way we can continue to improve our services. Please take a few minutes to complete the written survey that you may receive in the mail after your visit with us. Thank you!             Your Updated Medication List - Protect others around you: Learn how to safely use, store and throw away your medicines at www.disposemymeds.org.          This list is accurate as of: 5/10/17  5:13 PM.  Always use your most recent med list.                   Brand Name Dispense Instructions for use    B COMPLEX 1 Tabs      Take  by mouth daily.       buPROPion 100 MG tablet    WELLBUTRIN     Take 100 mg by mouth daily       FISH OIL PO      Take  by mouth daily.       fluorouracil 5 % cream    EFUDEX    40 g    Use  Efudex twice daily for 2-3 weeks or until the onset of irritation.       GLUCOSAMINE CHONDR COMPLEX PO      Take  by mouth.       lamoTRIgine 100 MG tablet    LaMICtal     Take 150 mg by mouth daily       levothyroxine 75 MCG tablet    SYNTHROID/LEVOTHROID    90 tablet    TAKE 1 TABLET (75 MCG) BY MOUTH DAILY       LORazepam 0.5 MG tablet    ATIVAN    15 tablet    Take 0.5-1 tablets (0.25-0.5 mg) by mouth every 8 hours as needed for anxiety or other Take 30 minutes prior to departure.  Do not operate a vehicle after taking this medication       Multiple vitamin Caps      Take  by mouth daily.       tazarotene 0.05 % topical gel    TAZORAC    30 g    Apply a pea  sized amount to the face at bedtime.       tiZANidine 4 MG tablet    ZANAFLEX     Take 4 mg by mouth 3 times daily as needed for muscle spasms       venlafaxine 75 MG 24 hr capsule    EFFEXOR-XR     Take 75 mg by mouth daily       vitamin D 1000 UNITS capsule      Take 1 capsule by mouth daily.       vitamin E 400 UNIT capsule      Take  by mouth daily.

## 2017-05-10 NOTE — PROGRESS NOTES
Chief Complaint   Patient presents with     Contact Lens Check     dilated fundus exam continuation of exam 5/3/17     Satisfied with contacts:  Yes    Good comfort:  Yes  Clear vision:     Yes    Deidra Parker Optometric Assistant, A.B.O.C.          Medical, surgical and family histories reviewed and updated 5/10/2017.       OBJECTIVE: See Ophthalmology exam    ASSESSMENT:    ICD-10-CM    1. Myopia, bilateral H52.13 CONTACT LENS CHECK   2. Presbyopia H52.4 CONTACT LENS CHECK    Ocular health normal both eyes    PLAN:    Patient Instructions   Contact lens prescription given.    Return in 1 year for a complete eye exam or sooner if needed.    Hi Daily, OD

## 2017-05-16 ENCOUNTER — OFFICE VISIT (OUTPATIENT)
Dept: NEUROLOGY | Facility: CLINIC | Age: 52
End: 2017-05-16
Attending: FAMILY MEDICINE
Payer: COMMERCIAL

## 2017-05-16 VITALS
HEIGHT: 63 IN | BODY MASS INDEX: 20.02 KG/M2 | SYSTOLIC BLOOD PRESSURE: 110 MMHG | HEART RATE: 76 BPM | WEIGHT: 113 LBS | DIASTOLIC BLOOD PRESSURE: 70 MMHG

## 2017-05-16 DIAGNOSIS — R42 DIZZINESS AND GIDDINESS: Primary | ICD-10-CM

## 2017-05-16 LAB — VIT B12 SERPL-MCNC: 787 PG/ML (ref 193–986)

## 2017-05-16 PROCEDURE — 99000 SPECIMEN HANDLING OFFICE-LAB: CPT | Performed by: PSYCHIATRY & NEUROLOGY

## 2017-05-16 PROCEDURE — 83921 ORGANIC ACID SINGLE QUANT: CPT | Mod: 90 | Performed by: PSYCHIATRY & NEUROLOGY

## 2017-05-16 PROCEDURE — 36415 COLL VENOUS BLD VENIPUNCTURE: CPT | Performed by: PSYCHIATRY & NEUROLOGY

## 2017-05-16 PROCEDURE — 82607 VITAMIN B-12: CPT | Performed by: PSYCHIATRY & NEUROLOGY

## 2017-05-16 PROCEDURE — 99203 OFFICE O/P NEW LOW 30 MIN: CPT | Performed by: PSYCHIATRY & NEUROLOGY

## 2017-05-16 NOTE — MR AVS SNAPSHOT
After Visit Summary   5/16/2017    Cynthia Marie Barthel    MRN: [id number]           Patient Information     Date Of Birth          1965        Visit Information        Provider Department      5/16/2017 8:30 AM Ortiz Rizvi MD [facility]        Today's Diagnoses     Dizziness and giddiness    -  1       Follow-ups after your visit        Follow-up notes from your care team     Return if symptoms worsen or fail to improve.      Your next 10 appointments already scheduled     May 30, 2017 12:15 PM CDT   Return Visit with Dariana Forde MD   [facility])    [address] [place] [address]   [phone]            Jun 14, 2017  4:15 PM CDT   Vestibular Treatment with Carol Redd PT   [place] [facility] ([facility]    [address] [place] [address]   [phone]            Feb 06, 2018 11:00 AM CST   LAB with LAB FIRST FLOOR [facility])    [address] [place] [address]   [phone]           Patient must bring picture ID.  Patient should be prepared to give a urine specimen  Please do not eat 10-12 hours before your appointment if you are coming in fasting for labs on lipids, cholesterol, or glucose (sugar).  Pregnant women should follow their Care Team instructions. Water with medications is okay. Do not drink coffee or other fluids.   If you have concerns about taking  your medications, please ask at office or if scheduling via [employer], send a message by clicking on Secure Messaging, Message Your Care Team.            Feb 06, 2018 11:30 AM CST   Return Visit with Gabriela Muir MD   [facility])    [address] [place] [address]   [phone]              Future tests that were ordered for you today     Open Future  "Orders        Priority Expected Expires Ordered    Vitamin B12 Routine 5/16/2017 5/16/2018 5/16/2017    Methylmalonic acid Routine 5/16/2017 5/16/2018 5/16/2017            Who to contact     If you have questions or need follow up information about today's clinic visit or your schedule please contact [facility] directly at [phone].  Normal or non-critical lab and imaging results will be communicated to you by PowerPlanhart, letter or phone within 4 business days after the clinic has received the results. If you do not hear from us within 7 days, please contact the clinic through PowerPlanhart or phone. If you have a critical or abnormal lab result, we will notify you by phone as soon as possible.  Submit refill requests through Combined Effort or call your pharmacy and they will forward the refill request to us. Please allow 3 business days for your refill to be completed.          Additional Information About Your Visit        [employer] Information     Combined Effort gives you secure access to your electronic health record. If you see a primary care provider, you can also send messages to your care team and make appointments. If you have questions, please call your primary care clinic.  If you do not have a primary care provider, please call [phone] and they will assist you.      Combined Effort is an electronic gateway that provides easy, online access to your medical records. With Combined [employer]ort, you can request a clinic appointment, read your test results, renew a prescription or communicate with your care team.     To access your existing account, please contact your [facility] or call [phone] for assistance.        Care EveryWhere ID     This is your Care EveryWhere ID. This could be used by other organizations to access your [place] medical records  [id number]        Your Vitals Were     Pulse Height BMI (Body Mass Index)             76 1.6 m (5' 3\") 20.02 kg/m2          Blood Pressure " from Last 3 Encounters:   05/16/17 110/70   04/17/17 100/60   01/10/17 115/84    Weight from Last 3 Encounters:   05/16/17 51.3 kg (113 lb)   04/17/17 51.6 kg (113 lb 11.2 oz)   01/10/17 51 kg (112 lb 6.4 oz)               Primary Care Provider Office Phone # Fax #    Ramakrishna Boyer [phone]       [facility] [address]  [facility] [address]        Thank you!     Thank you for choosing [facility]  for your care. Our goal is always to provide you with excellent care. Hearing back from our patients is one way we can continue to improve our services. Please take a few minutes to complete the written survey that you may receive in the mail after your visit with us. Thank you!             Your Updated Medication List - Protect others around you: Learn how to safely use, store and throw away your medicines at www.disposemymeds.org.          This list is accurate as of: 5/16/17  9:11 AM.  Always use your most recent med list.                   Brand Name Dispense Instructions for use    B COMPLEX 1 Tabs      Take  by mouth daily.       buPROPion 100 MG tablet    WELLBUTRIN     Take 100 mg by mouth daily       FISH OIL PO      Take  by mouth daily.       fluorouracil 5 % cream    EFUDEX    40 g    Use  Efudex twice daily for 2-3 weeks or until the onset of irritation.       GLUCOSAMINE CHONDR COMPLEX PO      Reported on 5/16/2017       lamoTRIgine 100 MG tablet    LaMICtal     Take 150 mg by mouth daily       levothyroxine 75 MCG tablet    SYNTHROID/LEVOTHROID    90 tablet    TAKE 1 TABLET (75 MCG) BY MOUTH DAILY       LORazepam 0.5 MG tablet    ATIVAN    15 tablet    Take 0.5-1 tablets (0.25-0.5 mg) by mouth every 8 hours as needed for anxiety or other Take 30 minutes prior to departure.  Do not operate a vehicle after taking this medication       Multiple vitamin Caps      Take  by mouth daily.       tazarotene 0.05 % topical gel    TAZORAC    30 g    Apply a pea  sized amount to the face at bedtime.       tiZANidine 4 MG tablet    ZANAFLEX     Take 4 mg by mouth 3 times daily as needed for muscle spasms       venlafaxine 75 MG 24 hr capsule    EFFEXOR-XR     Take 75 mg by mouth daily       vitamin D 1000 UNITS capsule      Take 1 capsule by mouth daily Reported on 5/16/2017       vitamin E 400 UNIT capsule      Take by mouth daily Reported on 5/16/2017

## 2017-05-16 NOTE — PROGRESS NOTES
HISTORY OF PRESENT ILLNESS:  Cynthia Barthel is a 51-year-old right-handed woman seen at the request of Dr. Boyer for neurologic consultation with chief complaint of dizzy spells.  Her symptoms began in early 04/2017.  She was having brief intense episodes of dizziness by which she means vertigo.  This went on for about a week.  Her head was spinning.  Rolling over in bed could provoke the symptom.  If she was lying down and sat up that could provoke the symptom.  Reaching down could provoke the symptom.  She was having these problems every day.  Then after about a week or so she had a much more intense episode that would not stop.  It was not a spinning as much as an impaired equilibrium.  She drove herself to the emergency room.  She was enrolled in a course of physical therapy.  She had the Epley maneuver 3 times.  This was in mid 04/2017.  The day after the Epley maneuver she was completely normal but then 2 days later began to have the problems again.  She had some meclizine and took the meclizine.  She went to work.  She was having the ongoing symptoms of the impaired equilibrium.  It was exhausting.  When she went home after work she slept and that was on the weekend and she pretty much stayed home that weekend.  Then a few days after that she was feeling good, but then she had the worst day ever.  It started when she got home from work at about 6:00 p.m.  She could barely get into the house.  She could not stop the sensation of falling.  It went on for hours.  She finally slept that night.  Since then she has been pretty normal.  The last week or more, she has resumed all of her normal activities and does not feel the symptom at all.  She has had no issues with her hearing or with ringing in her ears.  She had a normal eye exam.  Her sense of smell and taste are normal.  She has no rash issues with speech or swallowing and no focal symptoms in her arms or legs.  She has not had issues with bowel or bladder  control.  She is quite active in her garden but does not exercise on a regular basis.      PAST MEDICAL HISTORY:  Significant for having had thyroid surgery.  She is on replacement.  She has had sinus surgery as well.  She does not have high blood pressure, diabetes or asthma.  She has not had pertinent head injury.  She does take a B complex.  She does not drink alcohol.  She quit smoking.      MEDICATIONS:  Listed in the EMR.  She does take venlafaxine, tizanidine, Lamotrigine and bupropion.      SOCIAL HISTORY:  She works as an .  She lives alone.      FAMILY HISTORY:  Positive for atrial fibrillation.      PHYSICAL EXAMINATION:  The patient is cooperative and in no distress.  Her blood pressure is 110/70.  There are no carotid bruits.  Auscultation of the heart shows S1, S2, without murmur, rub or gallop.  Chest is clear to auscultation.      NEUROLOGIC EXAMINATION:  The patient is alert, oriented and lucid.  Cranial nerve testing shows full visual fields to confrontation.  Funduscopic exam shows sharp discs bilaterally.  Visual acuity is 20/20 in the right eye and 20/30 in the left eye.  Eye movements are complete and conjugate without nystagmus.  Pupils react to light.  Convergence movements of the eyes are preserved and pupils constrict on convergence.  Facies are symmetric, and tongue protrudes in the midline.  Motor evaluation shows no pronator drift, normal finger tapping, finger-nose-finger and heel-knee-shin.  Strength is preserved in the arms and legs.  Hallpike maneuver is slightly positive to the right, but only very brief and I could not discern any nystagmus.  Muscle stretch reflexes are low amplitude and symmetric.  Toes are downgoing.  Sensory exam shows preserved vibration and temperature.  Romberg sign is absent.  She can walk on her heels, toes and tandem.      I did review with her the MRI scan from [facility].  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  There is also a mild amount of gliosis in the cerebral white matter of doubtful significance.      ASSESSMENT:   1.  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      DISCUSSION:  The patient is seen for evaluation of symptoms of vertigo and also impaired equilibrium.  Part of her symptoms describe benign positional vertigo when she did respond to an Epley maneuver.  Her exam today does show a slightly positive Hallpike to the right.  In addition, she had 2 episodes of more marked impaired equilibrium that were sustained for hours.  Her workup in this regard has been nondiagnostic.  There are no findings on her exam and nothing in her history to suggest the pineal region cyst is a factor.  The gliosis noted in the cerebral white matter is of doubtful significance.  For the last week or 2 her symptoms have pretty much resolved and she has resumed all of her normal activities.  Part of it could have been an episode of vestibular neuritis.  At this point, I am not going to prescribe any treatment.  I am going to check a vitamin B12 level.  She should continue on with her activities.  If she has any relapses she needs to contact me promptly.  I will communicate the results of her B12 level to her when available.         SAM BARDALES MD             D: 2017 09:19   T: 2017 11:10   MT:       Name:     BARTHEL, CYNTHIA   MRN:      [id number]        Account:      [id number]   :      1965           Visit Date:   2017      Document: [id number]       cc: Ramakrishna Boyer MD          addendum: reviewed normal B12/MMA with pt.  She will f/u prn.

## 2017-05-16 NOTE — NURSING NOTE
"Cynthia Marie Barthel's goals for this visit include:   Chief Complaint   Patient presents with     Consult     Cerebral Gliosis        Chief Complaint   Patient presents with     Consult     Cerebral Gliosis        She requests these members of her care team be copied on today's visit information: yes     PCP: Ramakrishna Boyer    Referring Provider:  Ramakrishna Boyer MD  [facility]  [address] [place] [address]    Chief Complaint   Patient presents with     Consult     Cerebral Gliosis        Initial /70  Pulse 76  Ht 1.6 m (5' 3\")  Wt 51.3 kg (113 lb)  BMI 20.02 kg/m2 Estimated body mass index is 20.02 kg/(m^2) as calculated from the following:    Height as of this encounter: 1.6 m (5' 3\").    Weight as of this encounter: 51.3 kg (113 lb).  Medication Reconciliation: complete    Do you need any medication refills at today's visit?     "

## 2017-05-16 NOTE — LETTER
5/16/2017       RE: Cynthia Marie Barthel  [address]  [place] [address]     Dear Colleague,    Thank you for referring your patient, Cynthia Marie Barthel, to the [facility]. Please see a copy of my visit note below.    HISTORY OF PRESENT ILLNESS:  Cynthia Barthel is a 51-year-old right-handed woman seen at the request of Dr. Boyer for neurologic consultation with chief complaint of dizzy spells.  Her symptoms began in early 04/2017.  She was having brief intense episodes of dizziness by which she means vertigo.  This went on for about a week.  Her head was spinning.  Rolling over in bed could provoke the symptom.  If she was lying down and sat up that could provoke the symptom.  Reaching down could provoke the symptom.  She was having these problems every day.  Then after about a week or so she had a much more intense episode that would not stop.  It was not a spinning as much as an impaired equilibrium.  She drove herself to the emergency room.  She was enrolled in a course of physical therapy.  She had the Epley maneuver 3 times.  This was in mid 04/2017.  The day after the Epley maneuver she was completely normal but then 2 days later began to have the problems again.  She had some meclizine and took the meclizine.  She went to work.  She was having the ongoing symptoms of the impaired equilibrium.  It was exhausting.  When she went home after work she slept and that was on the weekend and she pretty much stayed home that weekend.  Then a few days after that she was feeling good, but then she had the worst day ever.  It started when she got home from work at about 6:00 p.m.  She could barely get into the house.  She could not stop the sensation of falling.  It went on for hours.  She finally slept that night.  Since then she has been pretty normal.  The last week or more, she has resumed all of her normal activities and does not  feel the symptom at all.  She has had no issues with her hearing or with ringing in her ears.  She had a normal eye exam.  Her sense of smell and taste are normal.  She has no rash issues with speech or swallowing and no focal symptoms in her arms or legs.  She has not had issues with bowel or bladder control.  She is quite active in her garden but does not exercise on a regular basis.      PAST MEDICAL HISTORY:  Significant for having had thyroid surgery.  She is on replacement.  She has had sinus surgery as well.  She does not have high blood pressure, diabetes or asthma.  She has not had pertinent head injury.  She does take a B complex.  She does not drink alcohol.  She quit smoking.      MEDICATIONS:  Listed in the EMR.  She does take venlafaxine, tizanidine, Lamotrigine and bupropion.      SOCIAL HISTORY:  She works as an .  She lives alone.      FAMILY HISTORY:  Positive for atrial fibrillation.      PHYSICAL EXAMINATION:  The patient is cooperative and in no distress.  Her blood pressure is 110/70.  There are no carotid bruits.  Auscultation of the heart shows S1, S2, without murmur, rub or gallop.  Chest is clear to auscultation.      NEUROLOGIC EXAMINATION:  The patient is alert, oriented and lucid.  Cranial nerve testing shows full visual fields to confrontation.  Funduscopic exam shows sharp discs bilaterally.  Visual acuity is 20/20 in the right eye and 20/30 in the left eye.  Eye movements are complete and conjugate without nystagmus.  Pupils react to light.  Convergence movements of the eyes are preserved and pupils constrict on convergence.  Facies are symmetric, and tongue protrudes in the midline.  Motor evaluation shows no pronator drift, normal finger tapping, finger-nose-finger and heel-knee-shin.  Strength is preserved in the arms and legs.  Hallpike maneuver is slightly positive to the right, but only very brief and I could not discern any nystagmus.  Muscle stretch reflexes are low  amplitude and symmetric.  Toes are downgoing.  Sensory exam shows preserved vibration and temperature.  Romberg sign is absent.  She can walk on her heels, toes and tandem.      I did review with her the MRI scan from [facility].  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  There is also a mild amount of gliosis in the cerebral white matter of doubtful significance.      ASSESSMENT:   1.  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      DISCUSSION:  The patient is seen for evaluation of symptoms of vertigo and also impaired equilibrium.  Part of her symptoms describe benign positional vertigo when she did respond to an Epley maneuver.  Her exam today does show a slightly positive Hallpike to the right.  In addition, she had 2 episodes of more marked impaired equilibrium that were sustained for hours.  Her workup in this regard has been nondiagnostic.  There are no findings on her exam and nothing in her history to suggest the pineal region cyst is a factor.  The gliosis noted in the cerebral white matter is of doubtful significance.  For the last week or 2 her symptoms have pretty much resolved and she has resumed all of her normal activities.  Part of it could have been an episode of vestibular neuritis.  At this point, I am not going to prescribe any treatment.  I am going to check a vitamin B12 level.  She should continue on with her activities.  If she has any relapses she needs to contact me promptly.  I will communicate the results of her B12 level to her when available.         SAM BARDALES MD             D: 2017 09:19   T: 2017 11:10   MT:       Name:     BARTHEL, CYNTHIA   MRN:      [id number]        Account:      [id number]   :      1965           Visit Date:   2017      Document: [id number]       cc: Ramakrishna Boyer MD      Again, thank you for allowing me to participate in the care of your patient.      Sincerely,    Sam  Ezio Rizvi MD

## 2017-05-18 LAB — METHYLMALONATE SERPL-SCNC: 0.18

## 2017-05-30 ENCOUNTER — OFFICE VISIT (OUTPATIENT)
Dept: DERMATOLOGY | Facility: CLINIC | Age: 52
End: 2017-05-30
Payer: COMMERCIAL

## 2017-05-30 DIAGNOSIS — Z87.2 HISTORY OF ACTINIC KERATOSIS: ICD-10-CM

## 2017-05-30 DIAGNOSIS — L82.1 SEBORRHEIC KERATOSIS: Primary | ICD-10-CM

## 2017-05-30 DIAGNOSIS — L70.0 ACNE VULGARIS: ICD-10-CM

## 2017-05-30 DIAGNOSIS — D22.9 MULTIPLE BENIGN NEVI: ICD-10-CM

## 2017-05-30 PROCEDURE — 99213 OFFICE O/P EST LOW 20 MIN: CPT | Performed by: DERMATOLOGY

## 2017-05-30 RX ORDER — FLUOROURACIL 50 MG/G
CREAM TOPICAL
Qty: 40 G | Refills: 0 | Status: SHIPPED | OUTPATIENT
Start: 2017-05-30 | End: 2018-06-07

## 2017-05-30 RX ORDER — TAZAROTENE 0.5 MG/G
GEL TOPICAL
Qty: 30 G | Refills: 11 | Status: SHIPPED | OUTPATIENT
Start: 2017-05-30 | End: 2018-06-07

## 2017-05-30 NOTE — NURSING NOTE
Dermatology Rooming Note    Cynthia Marie Barthel's goals for this visit include:   Chief Complaint   Patient presents with     RECHECK     skin check - no areas of concern - discuss medications       Is a scribe okay for this visit: YES    Are records needed for this visit(If yes, obtain release of information): NO     Vitals: There were no vitals taken for this visit.    Referring Provider:  No referring provider defined for this encounter.    Crystal Collins, CMA

## 2017-05-30 NOTE — PROGRESS NOTES
[facility] Dermatology Note      Dermatology Problem List:  1. Actinic keratosis  -History of Efudex use with redness of the face and swelling, possible allergy, however recently used without problem.   2. Acne vulgaris  -Current Tx: Tazorac with improvement  3. Sebaceous hyperplasia  -s/p destruction with hyfrecator 1/15/2015  4. Photoaging  -3 vitalize peels by outside provider with no issues but limited improvement.     Encounter Date: May 30, 2017    CC:  Chief Complaint   Patient presents with     RECHECK     skin check - no areas of concern - discuss medications         History of Present Illness:  Ms. Cynthia Marie Barthel is a 51 year old female who presents as a follow-up for skin evaluation in the setting of history of sun exposure. The patient was last seen 2/23/2017 when 6 SKs were treated with cryotherapy. Today, the patient reports that she is still using Tazorac for her acne. She has been using Efudex to scaling lesions on the face with resolution of the areas. She has been using Obaji Vitamin C for her face. The patient reports no other lesions of concern.     Past Medical History:   Patient Active Problem List   Diagnosis     Postsurgical hypothyroidism     Giant papillary conjunctivitis     Cerebral gliosis     Cyst of pineal gland     BPV (benign positional vertigo), bilateral     Past Medical History:   Diagnosis Date     Arthritis      No past surgical history on file.     Social History:  Reviewed and unchanged but kept in chart for clinician convenience  The patient works as a an .    Family History:  Reviewed and unchanged but kept in chart for clinician convenience  There is an unknown family history of skin cancer in the patient's father.  There is a family history of hay fever.  There is no family history of asthma, psoriasis, or eczema.     Medications:  Current Outpatient Prescriptions   Medication Sig Dispense Refill     venlafaxine (EFFEXOR-XR) 75 MG 24 hr  capsule Take 75 mg by mouth daily       buPROPion (WELLBUTRIN) 100 MG tablet Take 100 mg by mouth daily       LORazepam (ATIVAN) 0.5 MG tablet Take 0.5-1 tablets (0.25-0.5 mg) by mouth every 8 hours as needed for anxiety or other Take 30 minutes prior to departure.  Do not operate a vehicle after taking this medication 15 tablet 0     levothyroxine (SYNTHROID/LEVOTHROID) 75 MCG tablet TAKE 1 TABLET (75 MCG) BY MOUTH DAILY 90 tablet 3     lamoTRIgine (LAMICTAL) 100 MG tablet Take 150 mg by mouth daily       tiZANidine (ZANAFLEX) 4 MG tablet Take 4 mg by mouth 3 times daily as needed for muscle spasms       tazarotene (TAZORAC) 0.05 % gel Apply a pea sized amount to the face at bedtime. 30 g 11     fluorouracil (EFUDEX) 5 % cream Use  Efudex twice daily for 2-3 weeks or until the onset of irritation. 40 g 0     vitamin E 400 UNIT capsule Take by mouth daily Reported on 5/16/2017       Omega-3 Fatty Acids (FISH OIL PO) Take  by mouth daily.       Multiple Vitamin CAPS Take  by mouth daily.       B Complex Vitamins (B COMPLEX 1) TABS Take  by mouth daily.       Cholecalciferol (VITAMIN D) 1000 UNIT capsule Take 1 capsule by mouth daily Reported on 5/16/2017       Glucosamine-Chondroitin (GLUCOSAMINE CHONDR COMPLEX PO) Reported on 5/16/2017       Allergies   Allergen Reactions     Hay Fever & [A.R.M.]      Review of Systems:  -Skin: As above in HPI. No additional skin concerns.  -Const: The patient is generally feeling well today.     Physical exam:  GEN: This is a well developed, well-nourished female in no acute distress, in a pleasant mood.  SKIN: Total skin excluding the undergarment areas was performed. The exam included the head/face, neck, both arms, chest, back, abdomen, both legs, digits and/or nails. Declines genital exam.  -There is a waxy stuck on tan to brown papule on the left lower leg.  -Multiple regular brown pigmented macules and papules are identified on the trunk and extremiteis.   -Scattered brown  macules on sun exposed areas.  -No other lesions of concern on areas examined.     Impression/Plan:  1. History of actinic keratosis, no clinical evidence of recurrence  Restart Efudex 5% cream for active lesions. Apply twice daily for 2-3 weeks or until the onset of irritation to any active lesions.   2. Solar lentigines and photoaging    May continue Vitamin C topical or start CE Ferulic    Clear & Brilliant: Discussed procedure, need for multiple treatments and expected results. Will contact clinic if interested in treatment.   3. Seborrheic keratosis, non irritated:    Do not recommend cosmetic cryotherapy due to location.     No further intervention required at this time.   4. Multiple clinically benign nevi on the trunk and extremities    Recommend checking nails for moles between polish changes. Discussed with patient what features to watch    No further intervention required at this time.     Follow up in 1 year, earlier for new or changing lesions.     Staff Involved:  Scribe/Staff    Scribe Disclosure:   I, Amie Hanson, am serving as a scribe to document services personally performed by Dr. Dariana Forde, based on data collection and the provider's statements to me.       Provider Disclosure:   The documentation recorded by the scribe accurately reflects the services I personally performed and the decisions made by me.    Dariana Forde MD    Department of Dermatology  [facility]: Phone: [phone], Fax:[phone]  [facility]: Phone: [phone], Fax: [phone]

## 2017-06-03 ENCOUNTER — HEALTH MAINTENANCE LETTER (OUTPATIENT)
Age: 52
End: 2017-06-03

## 2017-07-17 ENCOUNTER — OFFICE VISIT (OUTPATIENT)
Dept: PEDIATRICS | Facility: CLINIC | Age: 52
End: 2017-07-17
Payer: COMMERCIAL

## 2017-07-17 VITALS
OXYGEN SATURATION: 95 % | TEMPERATURE: 98 F | WEIGHT: 112.1 LBS | SYSTOLIC BLOOD PRESSURE: 102 MMHG | HEART RATE: 80 BPM | BODY MASS INDEX: 19.86 KG/M2 | DIASTOLIC BLOOD PRESSURE: 66 MMHG

## 2017-07-17 DIAGNOSIS — T63.441S BEE STING REACTION, ACCIDENTAL OR UNINTENTIONAL, SEQUELA: ICD-10-CM

## 2017-07-17 DIAGNOSIS — F41.9 ANXIETY AND DEPRESSION: ICD-10-CM

## 2017-07-17 DIAGNOSIS — H81.13 BPV (BENIGN POSITIONAL VERTIGO), BILATERAL: ICD-10-CM

## 2017-07-17 DIAGNOSIS — M62.830 BACK MUSCLE SPASM: ICD-10-CM

## 2017-07-17 DIAGNOSIS — F32.A ANXIETY AND DEPRESSION: ICD-10-CM

## 2017-07-17 DIAGNOSIS — M51.369 DDD (DEGENERATIVE DISC DISEASE), LUMBAR: Primary | ICD-10-CM

## 2017-07-17 DIAGNOSIS — M41.9 SCOLIOSIS, UNSPECIFIED SCOLIOSIS TYPE, UNSPECIFIED SPINAL REGION: ICD-10-CM

## 2017-07-17 PROCEDURE — 99214 OFFICE O/P EST MOD 30 MIN: CPT | Performed by: FAMILY MEDICINE

## 2017-07-17 RX ORDER — EPINEPHRINE 0.3 MG/.3ML
0.3 INJECTION SUBCUTANEOUS PRN
Qty: 0.6 ML | Refills: 0 | Status: SHIPPED | OUTPATIENT
Start: 2017-07-17 | End: 2018-07-17

## 2017-07-17 ASSESSMENT — ANXIETY QUESTIONNAIRES
3. WORRYING TOO MUCH ABOUT DIFFERENT THINGS: SEVERAL DAYS
5. BEING SO RESTLESS THAT IT IS HARD TO SIT STILL: NOT AT ALL
GAD7 TOTAL SCORE: 4
2. NOT BEING ABLE TO STOP OR CONTROL WORRYING: SEVERAL DAYS
1. FEELING NERVOUS, ANXIOUS, OR ON EDGE: SEVERAL DAYS
7. FEELING AFRAID AS IF SOMETHING AWFUL MIGHT HAPPEN: NOT AT ALL
6. BECOMING EASILY ANNOYED OR IRRITABLE: NOT AT ALL

## 2017-07-17 ASSESSMENT — PAIN SCALES - GENERAL: PAINLEVEL: MILD PAIN (3)

## 2017-07-17 ASSESSMENT — PATIENT HEALTH QUESTIONNAIRE - PHQ9: 5. POOR APPETITE OR OVEREATING: SEVERAL DAYS

## 2017-07-17 NOTE — PATIENT INSTRUCTIONS
Continue to follow up with psychiatry at [facility]    Schedule for pap/fasting labs, mammogram in 10/2017    Call [phone] to schedule vestibular physical therapy

## 2017-07-17 NOTE — NURSING NOTE
"Chief Complaint   Patient presents with     Depression       Initial /66  Pulse 80  Temp 98  F (36.7  C) (Oral)  Wt 112 lb 1.6 oz (50.8 kg)  SpO2 95%  BMI 19.86 kg/m2 Estimated body mass index is 19.86 kg/(m^2) as calculated from the following:    Height as of 5/16/17: 5' 3\" (1.6 m).    Weight as of this encounter: 112 lb 1.6 oz (50.8 kg).  BP completed using cuff size: simin Olson CMA    "

## 2017-07-17 NOTE — MR AVS SNAPSHOT
After Visit Summary   7/17/2017    Cynthia Marie Barthel    MRN: [id number]           Patient Information     Date Of Birth          1965        Visit Information        Provider Department      7/17/2017 2:00 PM Ramakrishna Boyer MD [facility]        Today's Diagnoses     DDD (degenerative disc disease), lumbar    -  1    Back muscle spasm        Scoliosis, unspecified scoliosis type, unspecified spinal region        Bee sting reaction, accidental or unintentional, sequela          Care Instructions    Continue to follow up with psychiatry at [facility]    Schedule for pap/fasting labs, mammogram in 10/2017    Call [phone] to schedule vestibular physical therapy          Follow-ups after your visit        Your next 10 appointments already scheduled     Feb 06, 2018 11:00 AM CST   LAB with LAB FIRST FLOOR [facility])    [address] [facility] [id number] [phone]           Patient must bring picture ID.  Patient should be prepared to give a urine specimen  Please do not eat 10-12 hours before your appointment if you are coming in fasting for labs on lipids, cholesterol, or glucose (sugar).  Pregnant women should follow their Care Team instructions. Water with medications is okay. Do not drink coffee or other fluids.   If you have concerns about taking  your medications, please ask at office or if scheduling via [employer], send a message by clicking on Secure Messaging, Message Your Care Team.            Feb 06, 2018 11:30 AM CST   Return Visit with Gabriela Muir MD   [facility])    [address] [facility] [id number] [phone]            May 31, 2018 12:30 PM CDT   Return Visit with Dariana Forde MD   [facility])    [address] [facility] [id number] [phone]               Who to contact     If you have questions or need follow up information about today's clinic visit or your schedule please contact [facility] directly at [phone].  Normal or non-critical lab and imaging results will be communicated to you by [employer], letter or phone within 4 business days after the clinic has received the results. If you do not hear from us within 7 days, please contact the clinic through [employer] or phone. If you have a critical or abnormal lab result, we will notify you by phone as soon as possible.  Submit refill requests through [employer] or call your pharmacy and they will forward the refill request to us. Please allow 3 business days for your refill to be completed.          Additional Information About Your Visit        eEvent Information     eEvent gives you secure access to your electronic health record. If you see a primary care provider, you can also send messages to your care team and make appointments. If you have questions, please call your primary care clinic.  If you do not have a primary care provider, please call [phone] and they will assist you.      e[employer] is an electronic gateway that provides easy, online access to your medical records. With eEvent, you can request a clinic appointment, read your test results, renew a prescription or communicate with your care team.     To access your existing account, please contact your [facility] or call [phone] for assistance.        Care EveryWhere ID     This is your Care EveryWhere ID. This could be used by other organizations to access your [facility] medical records  [id number]        Your Vitals Were     Pulse Temperature Pulse Oximetry BMI (Body Mass Index)          80 98  F (36.7  C) (Oral) 95% 19.86 kg/m2         Blood Pressure from Last 3 Encounters:   07/17/17 102/66   05/16/17 110/70   04/17/17 100/60    Weight from Last 3 Encounters:   07/17/17 112 lb 1.6 oz  (50.8 kg)   05/16/17 113 lb (51.3 kg)   04/17/17 113 lb 11.2 oz (51.6 kg)              Today, you had the following     No orders found for display         Today's Medication Changes          These changes are accurate as of: 7/17/17  2:34 PM.  If you have any questions, ask your nurse or doctor.               Start taking these medicines.        Dose/Directions    EPINEPHrine 0.3 MG/0.3ML injection 2-pack   Commonly known as:  EPIPEN/ADRENACLICK/or ANY BX GENERIC EQUIV   Used for:  Bee sting reaction, accidental or unintentional, sequela   Started by:  Ramakrishna Boyer MD        Dose:  0.3 mg   Inject 0.3 mLs (0.3 mg) into the muscle as needed for anaphylaxis   Quantity:  0.6 mL   Refills:  0         Stop taking these medicines if you haven't already. Please contact your care team if you have questions.     LORazepam 0.5 MG tablet   Commonly known as:  ATIVAN   Stopped by:  Ramakrishna Boyer MD                Where to get your medicines      These medications were sent to [facility]/pharmacy #[id number] - [place], [place] - [address]  [address], [place] [address]     Phone:  [phone]     EPINEPHrine 0.3 MG/0.3ML injection 2-pack    tiZANidine 4 MG tablet                Primary Care Provider Office Phone # Fax #    Ramakrishna Boyer [phone]       [facility] [id number]  [facility] [address]        Equal Access to Services     [facility] AH: Hadii aad ku hadasho Soomaali, waaxda luqadaha, qaybta kaalmada adeegyada, waxay angelain hayjaden philly billingsley . So [facility] [phone].    ATENCIÓN: Si habla español, tiene a vo disposición servicios gratuitos de asistencia lingüística. Llame al [phone].    We comply with applicable federal civil rights laws and [place] laws. We do not discriminate on the basis of race, color, national origin, age, disability sex, sexual orientation or gender identity.            Thank you!     Thank you for choosing [facility]  for your care. Our goal is always to provide you with excellent care. Hearing back from our patients is one way we can continue to improve our services. Please take a few minutes to complete the written survey that you may receive in the mail after your visit with us. Thank you!             Your Updated Medication List - Protect others around you: Learn how to safely use, store and throw away your medicines at www.disposemymeds.org.          This list is accurate as of: 7/17/17  2:35 PM.  Always use your most recent med list.                   Brand Name Dispense Instructions for use Diagnosis    B COMPLEX 1 Tabs      Take  by mouth daily.        buPROPion 100 MG tablet    WELLBUTRIN     Take 100 mg by mouth daily        EPINEPHrine 0.3 MG/0.3ML injection 2-pack    EPIPEN/ADRENACLICK/or ANY BX GENERIC EQUIV    0.6 mL    Inject 0.3 mLs (0.3 mg) into the muscle as needed for anaphylaxis    Bee sting reaction, accidental or unintentional, sequela       FISH OIL PO      Take  by mouth daily.        fluorouracil 5 % cream    EFUDEX    40 g    Use  Efudex twice daily for 2-3 weeks or until the onset of irritation.    History of actinic keratosis       GLUCOSAMINE CHONDR COMPLEX PO      Reported on 5/16/2017        lamoTRIgine 100 MG tablet    LaMICtal     Take 150 mg by mouth daily        levonorgestrel 20 MCG/24HR IUD    MIRENA     1 each by Intrauterine route once        levothyroxine 75 MCG tablet    SYNTHROID/LEVOTHROID    90 tablet    TAKE 1 TABLET (75 MCG) BY MOUTH DAILY    Postsurgical hypothyroidism       Multiple vitamin Caps      Take  by mouth daily.        tazarotene 0.05 % topical gel    TAZORAC    30 g    Apply a pea sized amount to the face at bedtime.    Acne vulgaris       tiZANidine 4 MG tablet    ZANAFLEX    30 tablet    Take 1 tablet (4 mg) by mouth nightly as needed for muscle spasms    DDD (degenerative disc disease), lumbar, Back muscle spasm       venlafaxine 75 MG 24 hr  capsule    EFFEXOR-XR     Take 37.5 mg by mouth daily        vitamin D 1000 UNITS capsule      Take 1 capsule by mouth daily Reported on 5/16/2017        vitamin E 400 UNIT capsule      Take by mouth daily Reported on 5/16/2017

## 2017-07-17 NOTE — PROGRESS NOTES
SUBJECTIVE:                                                    Cynthia Marie Barthel is a 51 year old female who presents to clinic today for the following health issues:      Transfer of Care - Patient previously seen at [facility] and [facility] for depression, but provider left and they are having patient start with new provider from scratch. Patient feels she is very stable and doesn't wish to do so. Would prefer to see a PCP for management.    Vertigo - Patient c/o recurrence/worsening vertigo symptoms. Patient would like to start PT again for this and thinks she needs new orders.    Refill Request -   -Patient requesting new orders for Epi Pen refill. Patient has severe swelling reaction to bee and wasp stings.  -Tizanadine 4mg once at bedtime prn for back     Patient with past medical history significant for depression, anxiety, postsurgical hypothyroidism, benign positional vertigo, lumbar degenerative disc disease is here for follow-up on her medications.   Patient states, She needs to see a new psychiatrist at [facility] in order to continue with getting refills from them,Since her current psychiatrist had left the practice.  Patient denies concerns for bipolar disorder  At her last visit, she was diagnosed with benign positional vertigo, started on With jugular rehabilitation physical therapy and felt better. Patient has been feeling mild dizziness in the past few weeks, wants to restart physical therapy  She has a history of allergies to bees and wasps requesting refill for EpiPen  Also patient has a history of chronic low back pain and scoliosis and lumbar back muscle spasm taking tizanidine p.r.n. At bedtime, is requesting refills today          Problem list and histories reviewed & adjusted, as indicated.  Additional history: as documented    Patient Active Problem List   Diagnosis     Postsurgical hypothyroidism     Giant papillary conjunctivitis     Cerebral gliosis     Cyst of pineal gland      BPV (benign positional vertigo), bilateral     History reviewed. No pertinent surgical history.    Social History   Substance Use Topics     Smoking status: Former Smoker     Types: Cigarettes     Quit date: 2/10/2014     Smokeless tobacco: Never Used     Alcohol use Yes      Comment: one per day     Family History   Problem Relation Age of Onset     Hypertension Mother      CEREBROVASCULAR DISEASE Maternal Grandfather      Hypertension Maternal Aunt      Glaucoma Maternal Aunt      Macular Degeneration Maternal Aunt      Cataracts Maternal Aunt      Hypertension Maternal Uncle      Hypertension Paternal Aunt      DIABETES Paternal Uncle      Hypertension Paternal Uncle      CANCER No family hx of      Thyroid Disease No family hx of          Current Outpatient Prescriptions   Medication Sig Dispense Refill     levonorgestrel (MIRENA) 20 MCG/24HR IUD 1 each by Intrauterine route once       tiZANidine (ZANAFLEX) 4 MG tablet Take 1 tablet (4 mg) by mouth nightly as needed for muscle spasms 30 tablet 0     EPINEPHrine (EPIPEN/ADRENACLICK/OR ANY BX GENERIC EQUIV) 0.3 MG/0.3ML injection 2-pack Inject 0.3 mLs (0.3 mg) into the muscle as needed for anaphylaxis 0.6 mL 0     tazarotene (TAZORAC) 0.05 % topical gel Apply a pea sized amount to the face at bedtime. 30 g 11     fluorouracil (EFUDEX) 5 % cream Use  Efudex twice daily for 2-3 weeks or until the onset of irritation. 40 g 0     venlafaxine (EFFEXOR-XR) 75 MG 24 hr capsule Take 37.5 mg by mouth daily        buPROPion (WELLBUTRIN) 100 MG tablet Take 100 mg by mouth daily       levothyroxine (SYNTHROID/LEVOTHROID) 75 MCG tablet TAKE 1 TABLET (75 MCG) BY MOUTH DAILY 90 tablet 3     lamoTRIgine (LAMICTAL) 100 MG tablet Take 150 mg by mouth daily       vitamin E 400 UNIT capsule Take by mouth daily Reported on 5/16/2017       Glucosamine-Chondroitin (GLUCOSAMINE CHONDR COMPLEX PO) Reported on 5/16/2017       Omega-3 Fatty Acids (FISH OIL PO) Take  by mouth daily.        Multiple Vitamin CAPS Take  by mouth daily.       B Complex Vitamins (B COMPLEX 1) TABS Take  by mouth daily.       Cholecalciferol (VITAMIN D) 1000 UNIT capsule Take 1 capsule by mouth daily Reported on 5/16/2017       Allergies   Allergen Reactions     Bees Swelling     Wasps [Hornets] Swelling     Hay Fever & [A.R.M.]      Recent Labs   Lab Test  01/10/17   1044  01/06/16   0917  09/17/09   CR   --    --    --   0.61   GFRESTIMATED   --    --    --   >60   POTASSIUM   --    --    --   4.2   TSH  3.03  2.30   < >  0.09    < > = values in this interval not displayed.      BP Readings from Last 3 Encounters:   07/17/17 102/66   05/16/17 110/70   04/17/17 100/60    Wt Readings from Last 3 Encounters:   07/17/17 112 lb 1.6 oz (50.8 kg)   05/16/17 113 lb (51.3 kg)   04/17/17 113 lb 11.2 oz (51.6 kg)                  Labs reviewed in EPIC    Reviewed and updated as needed this visit by clinical staff  Tobacco  Allergies  Meds  Med Hx  Surg Hx  Fam Hx  Soc Hx      Reviewed and updated as needed this visit by Provider         ROS:  C: NEGATIVE for fever, chills, change in weight  E/M: NEGATIVE for ear, mouth and throat problems  R: NEGATIVE for significant cough or SOB  CV: NEGATIVE for chest pain, palpitations or peripheral edema  GI: NEGATIVE for nausea, abdominal pain, heartburn, or change in bowel habits  MUSCULOSKELETAL: NEGATIVE for significant arthralgias or myalgia  NEURO: dizziness/lightheadedness  ENDOCRINE: NEGATIVE for temperature intolerance, skin/hair changes and Hx thyroid disease  HEME/ALLERGY/IMMUNE: NEGATIVE for bleeding problems  PSYCHIATRIC: NEGATIVE for changes in mood or affect, HX anxiety and HX depression    OBJECTIVE:     /66  Pulse 80  Temp 98  F (36.7  C) (Oral)  Wt 112 lb 1.6 oz (50.8 kg)  SpO2 95%  BMI 19.86 kg/m2  Body mass index is 19.86 kg/(m^2).  GENERAL: healthy, alert and no distress  HENT: ear canals and TM's normal, nose and mouth without ulcers or lesions  NECK:  no adenopathy, no asymmetry, masses, or scars and thyroid normal to palpation  RESP: lungs clear to auscultation - no rales, rhonchi or wheezes  CV: regular rate and rhythm, normal S1 S2, no S3 or S4, no murmur, click or rub, no peripheral edema and peripheral pulses strong  MS: no gross musculoskeletal defects noted, no edema  Normal gait  SKIN: no suspicious lesions or rashes  NEURO: Normal strength and tone, mentation intact and speech normal  BACK: no CVA tenderness, no paralumbar tenderness  Comprehensive back pain exam:  No tenderness, Range of motion not limited by pain, Lower extremity strength functional and equal on both sides, Lower extremity reflexes within normal limits bilaterally, Lower extremity sensation normal and equal on both sides and Straight leg raise negative bilaterally  PSYCH: mentation appears normal, affect normal/bright    Diagnostic Test Results:  none     ASSESSMENT/PLAN:             1. DDD (degenerative disc disease), lumbar  Prescription refills given, recommended to continue with lower back rehabilitation exercises at home, follow-up p.r.n.  - tiZANidine (ZANAFLEX) 4 MG tablet; Take 1 tablet (4 mg) by mouth nightly as needed for muscle spasms  Dispense: 30 tablet; Refill: 0    2. Back muscle spasm  as above    - tiZANidine (ZANAFLEX) 4 MG tablet; Take 1 tablet (4 mg) by mouth nightly as needed for muscle spasms  Dispense: 30 tablet; Refill: 0    3. Scoliosis, unspecified scoliosis type, unspecified spinal region  as above      4. Bee sting reaction, accidental or unintentional, sequela  Refills given on EpiPen  - EPINEPHrine (EPIPEN/ADRENACLICK/OR ANY BX GENERIC EQUIV) 0.3 MG/0.3ML injection 2-pack; Inject 0.3 mLs (0.3 mg) into the muscle as needed for anaphylaxis  Dispense: 0.6 mL; Refill: 0      (H81.13) BPV (benign positional vertigo), bilateral  Comment:   Plan: Recommended to push fluids, follow caution while changing positions, restart vestibular rehabilitation physical  therapy  Patient was recently seen by a neurologist, Reviewed visit note    (F41.9,  F32.9) Anxiety and depression  Comment:   Plan: Recommended patient to continue care with psychiatry due to patient being on Lamictal  Patient verbalised understanding and is agreeable to the plan.        Chart documentation done in part with Dragon Voice recognition Software. Although reviewed after completion, some word and grammatical error may remain.    See Patient Instructions    Ramakrishna Boyer MD  [facility]

## 2017-07-18 ASSESSMENT — PATIENT HEALTH QUESTIONNAIRE - PHQ9: SUM OF ALL RESPONSES TO PHQ QUESTIONS 1-9: 2

## 2017-07-18 ASSESSMENT — ANXIETY QUESTIONNAIRES: GAD7 TOTAL SCORE: 4

## 2017-07-19 ENCOUNTER — HOSPITAL ENCOUNTER (OUTPATIENT)
Dept: PHYSICAL THERAPY | Facility: CLINIC | Age: 52
Setting detail: THERAPIES SERIES
End: 2017-07-19
Attending: FAMILY MEDICINE
Payer: COMMERCIAL

## 2017-07-19 PROCEDURE — 40000840 ZZHC STATISTIC PT VESTIBULAR VISIT: Performed by: PHYSICAL THERAPIST

## 2017-07-19 PROCEDURE — 97112 NEUROMUSCULAR REEDUCATION: CPT | Mod: GP | Performed by: PHYSICAL THERAPIST

## 2017-07-19 PROCEDURE — 95992 CANALITH REPOSITIONING PROC: CPT | Mod: GP | Performed by: PHYSICAL THERAPIST

## 2017-08-08 ENCOUNTER — TELEPHONE (OUTPATIENT)
Dept: DERMATOLOGY | Facility: CLINIC | Age: 52
End: 2017-08-08

## 2017-08-08 NOTE — TELEPHONE ENCOUNTER
[facility] Call Center    Phone Message    Name of Caller: Johanne    Phone Number: Cell number on file:    Telephone Information:   Mobile [phone]       Best time to return call: ANY    May a detailed message be left on voicemail: yes    Relation to patient: Self    Reason for Call: Other: Patient needs prior auth for tazarotene (TAZORAC) 0.05 % topical gel, its not formulary and if it is approved it has to be a one month supply, so 30 [employer].      Action Taken: Message routed to:  Adult Clinics: Dermatology p [phone]

## 2017-08-18 NOTE — TELEPHONE ENCOUNTER
[facility] Prior Authorization Team   Phone: [phone]  Fax: [phone]    PA Initiation    Medication: tazarotene (TAZORAC) 0.05 % topical gel  Insurance Company: [employer] - Phone [phone] Fax [phone]  Pharmacy Filling the Rx: [id number] - [place], [place] - [address]  Filling Pharmacy Phone: [phone]  Filling Pharmacy Fax: [phone]  Start Date: 8/18/2017

## 2017-08-23 NOTE — TELEPHONE ENCOUNTER
Prior Authorization Approval    Authorization Effective Date: 7/21/2017  Authorization Expiration Date: 8/21/2018  Medication: tazarotene (TAZORAC) 0.05 % topical gel- Approved  Approved Dose/Quantity: 30g per 30 days  Reference #:     Insurance Company: [employer] - Phone [phone] Fax [phone]  Expected CoPay: $0     CoPay Card Available:      Foundation Assistance Needed:    Which Pharmacy is filling the prescription (Not needed for infusion/clinic administered): [id number] - [place], [place] - [address] [place] [address]  Pharmacy Notified: Yes  Patient Notified: YesComment:  left voicemail

## 2017-10-11 ENCOUNTER — TRANSFERRED RECORDS (OUTPATIENT)
Dept: HEALTH INFORMATION MANAGEMENT | Facility: CLINIC | Age: 52
End: 2017-10-11

## 2017-11-04 DIAGNOSIS — Z13.6 CARDIOVASCULAR SCREENING; LDL GOAL LESS THAN 160: ICD-10-CM

## 2017-11-04 DIAGNOSIS — Z00.00 ROUTINE GENERAL MEDICAL EXAMINATION AT A HEALTH CARE FACILITY: Primary | ICD-10-CM

## 2017-11-04 DIAGNOSIS — Z13.0 SCREENING FOR DEFICIENCY ANEMIA: ICD-10-CM

## 2017-11-04 DIAGNOSIS — Z13.1 SCREENING FOR DIABETES MELLITUS (DM): ICD-10-CM

## 2017-11-04 DIAGNOSIS — Z11.59 NEED FOR HEPATITIS C SCREENING TEST: ICD-10-CM

## 2017-11-13 ENCOUNTER — OFFICE VISIT (OUTPATIENT)
Dept: PEDIATRICS | Facility: CLINIC | Age: 52
End: 2017-11-13
Payer: COMMERCIAL

## 2017-11-13 ENCOUNTER — RESULT FOLLOW UP (OUTPATIENT)
Dept: PEDIATRICS | Facility: CLINIC | Age: 52
End: 2017-11-13

## 2017-11-13 VITALS
HEART RATE: 64 BPM | SYSTOLIC BLOOD PRESSURE: 110 MMHG | TEMPERATURE: 97.9 F | DIASTOLIC BLOOD PRESSURE: 60 MMHG | HEIGHT: 62 IN | BODY MASS INDEX: 20.56 KG/M2 | WEIGHT: 111.7 LBS | OXYGEN SATURATION: 100 %

## 2017-11-13 DIAGNOSIS — Z13.0 SCREENING FOR DEFICIENCY ANEMIA: ICD-10-CM

## 2017-11-13 DIAGNOSIS — Z11.3 SCREEN FOR STD (SEXUALLY TRANSMITTED DISEASE): ICD-10-CM

## 2017-11-13 DIAGNOSIS — Z00.00 ROUTINE GENERAL MEDICAL EXAMINATION AT A HEALTH CARE FACILITY: Primary | ICD-10-CM

## 2017-11-13 DIAGNOSIS — Z11.59 NEED FOR HEPATITIS C SCREENING TEST: ICD-10-CM

## 2017-11-13 DIAGNOSIS — E89.0 POSTSURGICAL HYPOTHYROIDISM: ICD-10-CM

## 2017-11-13 DIAGNOSIS — Z13.6 CARDIOVASCULAR SCREENING; LDL GOAL LESS THAN 160: ICD-10-CM

## 2017-11-13 DIAGNOSIS — F32.A ANXIETY AND DEPRESSION: ICD-10-CM

## 2017-11-13 DIAGNOSIS — Z13.1 SCREENING FOR DIABETES MELLITUS (DM): ICD-10-CM

## 2017-11-13 DIAGNOSIS — Z00.00 ROUTINE GENERAL MEDICAL EXAMINATION AT A HEALTH CARE FACILITY: ICD-10-CM

## 2017-11-13 DIAGNOSIS — F41.9 ANXIETY AND DEPRESSION: ICD-10-CM

## 2017-11-13 DIAGNOSIS — Z12.4 SCREENING FOR CERVICAL CANCER: ICD-10-CM

## 2017-11-13 DIAGNOSIS — H81.13 BPV (BENIGN POSITIONAL VERTIGO), BILATERAL: ICD-10-CM

## 2017-11-13 DIAGNOSIS — R87.810 CERVICAL HIGH RISK HPV (HUMAN PAPILLOMAVIRUS) TEST POSITIVE: ICD-10-CM

## 2017-11-13 DIAGNOSIS — Z23 FLU VACCINE NEED: ICD-10-CM

## 2017-11-13 LAB
ALBUMIN SERPL-MCNC: 4.4 G/DL (ref 3.4–5)
ALP SERPL-CCNC: 80 U/L (ref 40–150)
ALT SERPL W P-5'-P-CCNC: 28 U/L (ref 0–50)
ANION GAP SERPL CALCULATED.3IONS-SCNC: 7 MMOL/L (ref 3–14)
AST SERPL W P-5'-P-CCNC: 16 U/L (ref 0–45)
BASOPHILS # BLD AUTO: 0 10E9/L (ref 0–0.2)
BASOPHILS NFR BLD AUTO: 0.6 %
BILIRUB SERPL-MCNC: 0.6 MG/DL (ref 0.2–1.3)
BUN SERPL-MCNC: 12 MG/DL (ref 7–30)
CALCIUM SERPL-MCNC: 9.6 MG/DL (ref 8.5–10.1)
CHLORIDE SERPL-SCNC: 105 MMOL/L (ref 94–109)
CHOLEST SERPL-MCNC: 256 MG/DL
CO2 SERPL-SCNC: 27 MMOL/L (ref 20–32)
CREAT SERPL-MCNC: 0.78 MG/DL (ref 0.52–1.04)
DIFFERENTIAL METHOD BLD: NORMAL
EOSINOPHIL # BLD AUTO: 0.1 10E9/L (ref 0–0.7)
EOSINOPHIL NFR BLD AUTO: 2.1 %
ERYTHROCYTE [DISTWIDTH] IN BLOOD BY AUTOMATED COUNT: 13.2 % (ref 10–15)
GFR SERPL CREATININE-BSD FRML MDRD: 77 ML/MIN/1.7M2
GLUCOSE SERPL-MCNC: 91 MG/DL (ref 70–99)
HCT VFR BLD AUTO: 38 % (ref 35–47)
HDLC SERPL-MCNC: 138 MG/DL
HGB BLD-MCNC: 12.8 G/DL (ref 11.7–15.7)
LDLC SERPL CALC-MCNC: 110 MG/DL
LYMPHOCYTES # BLD AUTO: 2.6 10E9/L (ref 0.8–5.3)
LYMPHOCYTES NFR BLD AUTO: 39.7 %
MCH RBC QN AUTO: 29.3 PG (ref 26.5–33)
MCHC RBC AUTO-ENTMCNC: 33.7 G/DL (ref 31.5–36.5)
MCV RBC AUTO: 87 FL (ref 78–100)
MONOCYTES # BLD AUTO: 0.5 10E9/L (ref 0–1.3)
MONOCYTES NFR BLD AUTO: 6.9 %
NEUTROPHILS # BLD AUTO: 3.3 10E9/L (ref 1.6–8.3)
NEUTROPHILS NFR BLD AUTO: 50.7 %
NONHDLC SERPL-MCNC: 118 MG/DL
PLATELET # BLD AUTO: 302 10E9/L (ref 150–450)
POTASSIUM SERPL-SCNC: 3.8 MMOL/L (ref 3.4–5.3)
PROT SERPL-MCNC: 7.9 G/DL (ref 6.8–8.8)
RBC # BLD AUTO: 4.37 10E12/L (ref 3.8–5.2)
SODIUM SERPL-SCNC: 139 MMOL/L (ref 133–144)
TRIGL SERPL-MCNC: 40 MG/DL
WBC # BLD AUTO: 6.6 10E9/L (ref 4–11)

## 2017-11-13 PROCEDURE — 87389 HIV-1 AG W/HIV-1&-2 AB AG IA: CPT | Performed by: FAMILY MEDICINE

## 2017-11-13 PROCEDURE — 87491 CHLMYD TRACH DNA AMP PROBE: CPT | Performed by: FAMILY MEDICINE

## 2017-11-13 PROCEDURE — G0145 SCR C/V CYTO,THINLAYER,RESCR: HCPCS | Performed by: FAMILY MEDICINE

## 2017-11-13 PROCEDURE — 87340 HEPATITIS B SURFACE AG IA: CPT | Performed by: FAMILY MEDICINE

## 2017-11-13 PROCEDURE — 87591 N.GONORRHOEAE DNA AMP PROB: CPT | Performed by: FAMILY MEDICINE

## 2017-11-13 PROCEDURE — 85025 COMPLETE CBC W/AUTO DIFF WBC: CPT | Performed by: FAMILY MEDICINE

## 2017-11-13 PROCEDURE — 90471 IMMUNIZATION ADMIN: CPT | Performed by: FAMILY MEDICINE

## 2017-11-13 PROCEDURE — 86803 HEPATITIS C AB TEST: CPT | Performed by: FAMILY MEDICINE

## 2017-11-13 PROCEDURE — 99396 PREV VISIT EST AGE 40-64: CPT | Mod: 25 | Performed by: FAMILY MEDICINE

## 2017-11-13 PROCEDURE — 80053 COMPREHEN METABOLIC PANEL: CPT | Performed by: FAMILY MEDICINE

## 2017-11-13 PROCEDURE — 90686 IIV4 VACC NO PRSV 0.5 ML IM: CPT | Performed by: FAMILY MEDICINE

## 2017-11-13 PROCEDURE — 80061 LIPID PANEL: CPT | Performed by: FAMILY MEDICINE

## 2017-11-13 PROCEDURE — 87624 HPV HI-RISK TYP POOLED RSLT: CPT | Performed by: FAMILY MEDICINE

## 2017-11-13 PROCEDURE — 36415 COLL VENOUS BLD VENIPUNCTURE: CPT | Performed by: FAMILY MEDICINE

## 2017-11-13 PROCEDURE — 86780 TREPONEMA PALLIDUM: CPT | Performed by: FAMILY MEDICINE

## 2017-11-13 ASSESSMENT — PAIN SCALES - GENERAL: PAINLEVEL: NO PAIN (0)

## 2017-11-13 NOTE — LETTER
August 24, 2020      Johanne Renukae Barthel  [address] [place] [address]    Dear MsSofíaBarthel,      At [facility], your health and wellness is our primary concern. That is why we are following up on ECC from 9/4/19. Your provider had recommended that you have a Pap smear and HPV test completed by 9/4/20. Our records do not show that this has been scheduled.    It is important to complete the follow up that your provider has suggested for you to ensure that there are no worsening changes which may, over time, develop into cancer.      Please contact our office at  [phone] to schedule an appointment for a Pap smear and HPV test at your earliest convenience. If you have questions or concerns, please call the clinic and we will be happy to assist you.    If you have completed the tests outside of [facility], please have the results forwarded to our office. We will update the chart for your primary Physician to review before your next annual physical.     Thank you for choosing [facility]!    Sincerely,      Your [facility] Care Team/fabienne

## 2017-11-13 NOTE — PATIENT INSTRUCTIONS
GET THE LABS TODAY  GET THE FLU SHOT TODAY      Preventive Health Recommendations  Female Ages 50 - 64    Yearly exam: See your health care provider every year in order to  o Review health changes.   o Discuss preventive care.    o Review your medicines if your doctor has prescribed any.      Get a Pap test every three years (unless you have an abnormal result and your provider advises testing more often).    If you get Pap tests with HPV test, you only need to test every 5 years, unless you have an abnormal result.     You do not need a Pap test if your uterus was removed (hysterectomy) and you have not had cancer.    You should be tested each year for STDs (sexually transmitted diseases) if you're at risk.     Have a mammogram every 1 to 2 years.    Have a colonoscopy at age 50, or have a yearly FIT test (stool test). These exams screen for colon cancer.      Have a cholesterol test every 5 years, or more often if advised.    Have a diabetes test (fasting glucose) every three years. If you are at risk for diabetes, you should have this test more often.     If you are at risk for osteoporosis (brittle bone disease), think about having a bone density scan (DEXA).    Shots: Get a flu shot each year. Get a tetanus shot every 10 years.    Nutrition:     Eat at least 5 servings of fruits and vegetables each day.    Eat whole-grain bread, whole-wheat pasta and brown rice instead of white grains and rice.    Talk to your provider about Calcium and Vitamin D.     Lifestyle    Exercise at least 150 minutes a week (30 minutes a day, 5 days a week). This will help you control your weight and prevent disease.    Limit alcohol to one drink per day.    No smoking.     Wear sunscreen to prevent skin cancer.     See your dentist every six months for an exam and cleaning.    See your eye doctor every 1 to 2 years.
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
## 2017-11-13 NOTE — MR AVS SNAPSHOT
After Visit Summary   11/13/2017    Cynthia Marie Barthel    MRN: [id number]           Patient Information     Date Of Birth          1965        Visit Information        Provider Department      11/13/2017 3:50 PM Ramakrishna Boyer MD [facility]        Today's Diagnoses     Routine general medical examination at a health care facility    -  1    Postsurgical hypothyroidism        CARDIOVASCULAR SCREENING; LDL GOAL LESS THAN 160        Anxiety and depression        Screening for cervical cancer        Screen for STD (sexually transmitted disease)          Care Instructions    GET THE LABS TODAY  GET THE FLU SHOT TODAY      Preventive Health Recommendations  Female Ages 50 - 64    Yearly exam: See your health care provider every year in order to  o Review health changes.   o Discuss preventive care.    o Review your medicines if your doctor has prescribed any.      Get a Pap test every three years (unless you have an abnormal result and your provider advises testing more often).    If you get Pap tests with HPV test, you only need to test every 5 years, unless you have an abnormal result.     You do not need a Pap test if your uterus was removed (hysterectomy) and you have not had cancer.    You should be tested each year for STDs (sexually transmitted diseases) if you're at risk.     Have a mammogram every 1 to 2 years.    Have a colonoscopy at age 50, or have a yearly FIT test (stool test). These exams screen for colon cancer.      Have a cholesterol test every 5 years, or more often if advised.    Have a diabetes test (fasting glucose) every three years. If you are at risk for diabetes, you should have this test more often.     If you are at risk for osteoporosis (brittle bone disease), think about having a bone density scan (DEXA).    Shots: Get a flu shot each year. Get a tetanus shot every 10 years.    Nutrition:     Eat at least 5 servings of fruits and vegetables each  "day.    Eat whole-grain bread, whole-wheat pasta and brown rice instead of white grains and rice.    Talk to your provider about Calcium and Vitamin D.     Lifestyle    Exercise at least 150 minutes a week (30 minutes a day, 5 days a week). This will help you control your weight and prevent disease.    Limit alcohol to one drink per day.    No smoking.     Wear sunscreen to prevent skin cancer.     See your dentist every six months for an exam and cleaning.    See your eye doctor every 1 to 2 years.            Follow-ups after your visit        Your next 10 appointments already scheduled     Feb 06, 2018 10:30 AM CST   MA SCREENING DIGITAL BILATERAL with MGMA1, [facility] TE[facility]    [address] [address] [place] [id number] [phone]           Do not use any powder, lotion or deodorant under your arms or on your breast. If you do, we will ask you to remove it before your exam.  Wear comfortable, two-piece clothing.  If you have any allergies, tell your care team.  Bring any previous mammograms from other facilities or have them mailed to the breast center. Three-dimensional (3D) mammograms are available at [place] locations in [facility], [facility], [facility], and [place]. [facility] locations include [place] [facility] in [place]. Benefits of 3D mammograms include: - Improved rate of cancer detection - Decreases your chance of having to go back for more tests, which means fewer: - \"False-positive\" results (This means that there is an abnormal area but it isn't cancer.) - Invasive testing procedures, such as a biopsy or surgery - Can provide clearer images of the breast if you have dense breast tissue. 3D mammography is an optional exam that anyone can have with a 2D mammogram. It doesn't replace or take the place of a 2D mammogram. 2D mammograms remain an effective screening test for all " women.  Not all insurance companies cover the cost of a 3D mammogram. Check with your insurance.            Feb 06, 2018 11:00 AM CST   LAB with LAB FIRST FLOOR [facility] ([facility])    [address] [place] [id number] [phone]           Please do not eat 10-12 hours before your appointment if you are coming in fasting for labs on lipids, cholesterol, or glucose (sugar). This does not apply to pregnant women. Water, hot tea and black coffee (with nothing added) are okay. Do not drink other fluids, diet soda or chew gum.            Feb 06, 2018 11:30 AM CST   [facility] Visit with Gabriela Muir MD   [facility]    [address] [place] [id number] [phone]            May 31, 2018 12:30 PM CDT   Return Visit with Dariana Forde MD   [facility]    [address] [place] [id number] [phone]              Who to contact     If you have questions or need follow up information about today's clinic visit or your schedule please contact [facility] directly at [phone].  Normal or non-critical lab and imaging results will be communicated to you by MyChart, letter or phone within 4 business days after the clinic has received the results. If you do not hear from us within 7 days, please contact the clinic through MyChart or phone. If you have a critical or abnormal lab result, we will notify you by phone as soon as possible.  Submit refill requests through [employer] or call your pharmacy and they will forward the refill request to us. Please allow 3 business days for your refill to be completed.          Additional Information About Your Visit        [employer] Information     [employer] gives you secure access to your electronic health record. If you see a primary care provider, you can also send messages to your  "care team and make appointments. If you have questions, please call your primary care clinic.  If you do not have a primary care provider, please call [phone] and they will assist you.      [employer] is an electronic gateway that provides easy, online access to your medical records. With [employer], you can request a clinic appointment, read your test results, renew a prescription or communicate with your care team.     To access your existing account, please contact your [facility] or call [phone] for assistance.        Care EveryWhere ID     This is your Care EveryWhere ID. This could be used by other organizations to access your [facility] medical records  [id number]        Your Vitals Were     Pulse Temperature Height Pulse Oximetry BMI (Body Mass Index)       64 97.9  F (36.6  C) (Oral) 5' 2.25\" (1.581 m) 100% 20.27 kg/m2        Blood Pressure from Last 3 Encounters:   11/13/17 110/60   07/17/17 102/66   05/16/17 110/70    Weight from Last 3 Encounters:   11/13/17 111 lb 11.2 oz (50.7 kg)   07/17/17 112 lb 1.6 oz (50.8 kg)   05/16/17 113 lb (51.3 kg)              We Performed the Following     Anti Treponema     CHLAMYDIA TRACHOMATIS PCR     Hepatitis B surface antigen     HIV Antigen Antibody Combo     HPV High Risk Types DNA Cervical     NEISSERIA GONORRHOEA PCR     Pap imaged thin layer screen with HPV - recommended age 30 - 65 years (select HPV order below)          Today's Medication Changes          These changes are accurate as of: 11/13/17  4:14 PM.  If you have any questions, ask your nurse or doctor.               Stop taking these medicines if you haven't already. Please contact your care team if you have questions.     venlafaxine 75 MG 24 hr capsule   Commonly known as:  EFFEXOR-XR   Stopped by:  Ramakrishna Boyer MD                    Primary Care Provider Office Phone # Fax #    Ramakrishna Boyer [phone] [address]  [place] [address]        Equal Access to Services     [facility]: Hadii heaven ku tesas Hebert, wafamda luqadaha, qaybta kaalmada michaelstarpolly, anny boydharrietbonifacio cavazos. So [facility] [phone].    ATENCIÓN: Si habla español, tiene a vo disposición servicios gratuitos de asistencia lingüística. Sandra al [phone].    We comply with applicable federal civil rights laws and [place] laws. We do not discriminate on the basis of race, color, national origin, age, disability, sex, sexual orientation, or gender identity.            Thank you!     Thank you for choosing [facility]  for your care. Our goal is always to provide you with excellent care. Hearing back from our patients is one way we can continue to improve our services. Please take a few minutes to complete the written survey that you may receive in the mail after your visit with us. Thank you!             Your Updated Medication List - Protect others around you: Learn how to safely use, store and throw away your medicines at www.disposemymeds.org.          This list is accurate as of: 11/13/17  4:14 PM.  Always use your most recent med list.                   Brand Name Dispense Instructions for use Diagnosis    B COMPLEX 1 Tabs      Take  by mouth daily.        buPROPion 100 MG tablet    WELLBUTRIN     Take 100 mg by mouth daily        CITRACAL CALCIUM+D PO      Take 1 tablet by mouth daily        EPINEPHrine 0.3 MG/0.3ML injection 2-pack    EPIPEN/ADRENACLICK/or ANY BX GENERIC EQUIV    0.6 mL    Inject 0.3 mLs (0.3 mg) into the muscle as needed for anaphylaxis    Bee sting reaction, accidental or unintentional, sequela       FISH OIL PO      Take  by mouth daily.        fluorouracil 5 % cream    EFUDEX    40 g    Use  Efudex twice daily for 2-3 weeks or until the onset of irritation.    History of actinic keratosis       GLUCOSAMINE CHONDR COMPLEX PO      Reported on 5/16/2017        lamoTRIgine 100 MG tablet    LaMICtal      Take 150 mg by mouth daily        levonorgestrel 20 MCG/24HR IUD    MIRENA     1 each by Intrauterine route once        levothyroxine 75 MCG tablet    SYNTHROID/LEVOTHROID    90 tablet    TAKE 1 TABLET (75 MCG) BY MOUTH DAILY    Postsurgical hypothyroidism       MAGNESIUM OXIDE PO      Take 1 tablet by mouth daily        Multiple vitamin Caps      Take  by mouth daily.        tazarotene 0.05 % topical gel    TAZORAC    30 g    Apply a pea sized amount to the face at bedtime.    Acne vulgaris       tiZANidine 4 MG tablet    ZANAFLEX    30 tablet    Take 1 tablet (4 mg) by mouth nightly as needed for muscle spasms    DDD (degenerative disc disease), lumbar, Back muscle spasm       vitamin E 400 UNIT capsule      Take by mouth daily Reported on 5/16/2017

## 2017-11-13 NOTE — NURSING NOTE
"Chief Complaint   Patient presents with     Physical     Flu Shot       Initial /60 (BP Location: Right arm, Patient Position: Sitting, Cuff Size: Adult Regular)  Pulse 64  Temp 97.9  F (36.6  C) (Oral)  Ht 5' 2.25\" (1.581 m)  Wt 111 lb 11.2 oz (50.7 kg)  SpO2 100%  BMI 20.27 kg/m2 Estimated body mass index is 20.27 kg/(m^2) as calculated from the following:    Height as of this encounter: 5' 2.25\" (1.581 m).    Weight as of this encounter: 111 lb 11.2 oz (50.7 kg).  BP completed using cuff size: regular  Shashi Olson CMA    "

## 2017-11-13 NOTE — LETTER
August 16, 2019      Johanne Renukae Barthel  [address] [place] [address]    Dear Ms.Barthel,      At [facility], your health and wellness is our primary concern. That is why we are following up on a colposcopy from 2/25/19. Your provider had recommended that you have a Pap smear and HPV test completed by 8/25/19. Our records do not show that this has been scheduled.    It is important to complete the follow up that your provider has suggested for you to ensure that there are no worsening changes which may, over time, develop into cancer.      Please contact our office at  [phone] to schedule an appointment for a Pap smear and HPV test at your earliest convenience. If you have questions or concerns, please call the clinic and we will be happy to assist you.    If you have completed the tests outside of [facility], please have the results forwarded to our office. We will update the chart for your primary Physician to review before your next annual physical.     Thank you for choosing [facility]!    Sincerely,      Your [facility] Care Team/fabienne

## 2017-11-13 NOTE — LETTER
October 29, 2018      Johanne Marie Barthel  [address] [place] [address]    Dear Ms.Barthel,      At [facility], your health and wellness is our primary concern. That is why we are following up on a positive high risk HPV test from 11/13/17. Your provider had recommended that you have a Pap smear and HPV test completed by 11/13/18. Our records do not show that this has been scheduled.    It is important to complete the follow up that your provider has suggested for you to ensure that there are no worsening changes which may, over time, develop into cancer.      Please contact our office at  [phone] to schedule an appointment for a Pap smear and HPV test at your earliest convenience. If you have questions or concerns, please call the clinic and we will be happy to assist you.    If you have completed the tests outside of [facility], please have the results forwarded to our office. We will update the chart for your primary Physician to review before your next annual physical.     Thank you for choosing [facility]!    Sincerely,      Ramakrishna Boyer MD/fabienne

## 2017-11-13 NOTE — LETTER
August 25, 2019      Johanne Renukae Barthel  [address] [place] [address]    Dear Ms.Barthel,      At [facility], your health and wellness is our primary concern. That is why we are following up on a colposcopy from 2/25/19. Your provider had recommended that you have a Pap smear and HPV test completed by 8/25/19. Our records do not show that this has been scheduled.    It is important to complete the follow up that your provider has suggested for you to ensure that there are no worsening changes which may, over time, develop into cancer.      Please contact our office at  [phone] to schedule an appointment for a Pap smear and HPV test at your earliest convenience. If you have questions or concerns, please call the clinic and we will be happy to assist you.    If you have completed the tests outside of [facility], please have the results forwarded to our office. We will update the chart for your primary Physician to review before your next annual physical.     Thank you for choosing [facility]!    Sincerely,      Your [facility] Care Team/fabienne

## 2017-11-14 LAB
C TRACH DNA SPEC QL NAA+PROBE: NEGATIVE
HBV SURFACE AG SERPL QL IA: NONREACTIVE
HCV AB SERPL QL IA: NONREACTIVE
HIV 1+2 AB+HIV1 P24 AG SERPL QL IA: NONREACTIVE
N GONORRHOEA DNA SPEC QL NAA+PROBE: NEGATIVE
SPECIMEN SOURCE: NORMAL
SPECIMEN SOURCE: NORMAL
T PALLIDUM IGG+IGM SER QL: NEGATIVE

## 2017-11-15 LAB
COPATH REPORT: NORMAL
PAP: NORMAL

## 2017-11-17 LAB
FINAL DIAGNOSIS: ABNORMAL
HPV HR 12 DNA CVX QL NAA+PROBE: POSITIVE
HPV16 DNA SPEC QL NAA+PROBE: NEGATIVE
HPV18 DNA SPEC QL NAA+PROBE: NEGATIVE
SPECIMEN DESCRIPTION: ABNORMAL

## 2017-11-19 NOTE — PROGRESS NOTES
2006, 2007,2008, 2010 all NIL paps in Care Everywhere.  11/13/17 NIL pap, + HR HPV (not 16 or 18). Plan: cotest in 1 yr.  10/29/18 Cotest reminder letter sent (rlm)  11/28/18 NIL pap, + HR HPV (not 16 or 18). Plan: colp  12/6/18 Pt notified by phone  1/30/19 colp - cancelled due to weather  2/25/19 [facility]: Endocervical mucosa with microglandular hyperplasia. Plan: cotest in 6 mo.   8/16/19 My Chart cotest reminder message sent (rlm)  8/25/19 My Chart not read, reminder letter sent (rlm)  9/4/19 [facility]CC- Negative for dysplasia.  NIL pap, + HR HPV (not 16 or 18). Plan  cotest in 1 year. (MRA)  9/11/19 Result released to pt via [facility]. (MRA)

## 2018-02-05 ENCOUNTER — DOCUMENTATION ONLY (OUTPATIENT)
Dept: LAB | Facility: CLINIC | Age: 53
End: 2018-02-05

## 2018-02-05 DIAGNOSIS — E89.0 POSTSURGICAL HYPOTHYROIDISM: Primary | ICD-10-CM

## 2018-02-06 ENCOUNTER — OFFICE VISIT (OUTPATIENT)
Dept: ENDOCRINOLOGY | Facility: CLINIC | Age: 53
End: 2018-02-06
Payer: COMMERCIAL

## 2018-02-06 ENCOUNTER — RADIANT APPOINTMENT (OUTPATIENT)
Dept: MAMMOGRAPHY | Facility: CLINIC | Age: 53
End: 2018-02-06
Attending: FAMILY MEDICINE
Payer: COMMERCIAL

## 2018-02-06 VITALS
TEMPERATURE: 98.1 F | OXYGEN SATURATION: 97 % | DIASTOLIC BLOOD PRESSURE: 67 MMHG | HEIGHT: 62 IN | WEIGHT: 116 LBS | HEART RATE: 68 BPM | BODY MASS INDEX: 21.35 KG/M2 | SYSTOLIC BLOOD PRESSURE: 102 MMHG

## 2018-02-06 DIAGNOSIS — E89.0 POSTSURGICAL HYPOTHYROIDISM: ICD-10-CM

## 2018-02-06 DIAGNOSIS — E04.1 RIGHT THYROID NODULE: Primary | ICD-10-CM

## 2018-02-06 DIAGNOSIS — Z12.31 ENCOUNTER FOR MAMMOGRAM TO ESTABLISH BASELINE MAMMOGRAM: ICD-10-CM

## 2018-02-06 LAB
T4 FREE SERPL-MCNC: 1.15 NG/DL (ref 0.76–1.46)
TSH SERPL DL<=0.005 MIU/L-ACNC: 1.71 MU/L (ref 0.4–4)

## 2018-02-06 PROCEDURE — 77067 SCR MAMMO BI INCL CAD: CPT

## 2018-02-06 PROCEDURE — 36415 COLL VENOUS BLD VENIPUNCTURE: CPT | Performed by: INTERNAL MEDICINE

## 2018-02-06 PROCEDURE — 99213 OFFICE O/P EST LOW 20 MIN: CPT | Performed by: INTERNAL MEDICINE

## 2018-02-06 PROCEDURE — 84443 ASSAY THYROID STIM HORMONE: CPT | Performed by: INTERNAL MEDICINE

## 2018-02-06 PROCEDURE — 84439 ASSAY OF FREE THYROXINE: CPT | Performed by: INTERNAL MEDICINE

## 2018-02-06 RX ORDER — LEVOTHYROXINE SODIUM 75 UG/1
75 TABLET ORAL DAILY
Qty: 90 TABLET | Refills: 3 | Status: SHIPPED | OUTPATIENT
Start: 2018-02-06 | End: 2019-01-22

## 2018-02-06 NOTE — MR AVS SNAPSHOT
After Visit Summary   2/6/2018    Cynthia Marie Barthel    MRN: [id number]           Patient Information     Date Of Birth          1965        Visit Information        Provider Department      2/6/2018 11:30 AM Gabriela Muir MD [facility]        Today's Diagnoses     Right thyroid nodule    -  1    Postsurgical hypothyroidism           Follow-ups after your visit        Follow-up notes from your care team     Return in about 1 year (around 2/6/2019).      Your next 10 appointments already scheduled     Jun 07, 2018 12:15 PM CDT   Return Visit with Dariana Forde MD   [facility] ([facility])    [id number] [place] [id number] [phone]              Future tests that were ordered for you today     Open Future Orders        Priority Expected Expires Ordered    TSH Routine 2/5/2019 2/6/2019 2/6/2018    T4 free Routine 2/5/2019 2/6/2019 2/6/2018            Who to contact     If you have questions or need follow up information about today's clinic visit or your schedule please contact [facility] directly at [phone].  Normal or non-critical lab and imaging results will be communicated to you by MyChart, letter or phone within 4 business days after the clinic has received the results. If you do not hear from us within 7 days, please contact the clinic through [employer] or phone. If you have a critical or abnormal lab result, we will notify you by phone as soon as possible.  Submit refill requests through [facility] or call your pharmacy and they will forward the refill request to us. Please allow 3 business days for your refill to be completed.          Additional Information About Your Visit        MyChart Information     [facility] gives you secure access to your electronic health record. If you see a primary care provider, you can also send messages to your care team and make appointments. If you have  "questions, please call your primary care clinic.  If you do not have a primary care provider, please call [phone] and they will assist you.      [employer] is an electronic gateway that provides easy, online access to your medical records. With [employer], you can request a clinic appointment, read your test results, renew a prescription or communicate with your care team.     To access your existing account, please contact your [facility] or call [phone] for assistance.        Care EveryWhere ID     This is your Care EveryWhere ID. This could be used by other organizations to access your [facility] medical records  [id number]        Your Vitals Were     Pulse Temperature Height Pulse Oximetry BMI (Body Mass Index)       68 98.1  F (36.7  C) 1.583 m (5' 2.32\") 97% 21 kg/m2        Blood Pressure from Last 3 Encounters:   02/06/18 102/67   11/13/17 110/60   07/17/17 102/66    Weight from Last 3 Encounters:   02/06/18 52.6 kg (116 lb)   11/13/17 50.7 kg (111 lb 11.2 oz)   07/17/17 50.8 kg (112 lb 1.6 oz)                 Today's Medication Changes          These changes are accurate as of 2/6/18 11:45 AM.  If you have any questions, ask your nurse or doctor.               These medicines have changed or have updated prescriptions.        Dose/Directions    levothyroxine 75 MCG tablet   Commonly known as:  SYNTHROID/LEVOTHROID   This may have changed:  See the new instructions.   Used for:  Postsurgical hypothyroidism   Changed by:  aGbriela Muir MD        Dose:  75 mcg   Take 1 tablet (75 mcg) by mouth daily   Quantity:  90 tablet   Refills:  3            Where to get your medicines      These medications were sent to [facility]/pharmacy #[id number] - [place], [place] - [address]  [address] [facility] [place] [address]     Phone:  [phone]     levothyroxine 75 MCG tablet                Primary Care Provider Office Phone # Fax #    Ramakrishna Boyer [phone] " [phone]       [address]  [facility] [address]        Equal Access to Services     BLANQUITA WONG : Hadii aad ku reneao Sololaali, waaxda luqadaha, qaybta kaalmada adeelbert, anny angelanaomi garcia lamichdoyle cavazos. So [facility] [phone].    ATENCIÓN: Si habla español, tiene a vo disposición servicios gratuitos de asistencia lingüística. Llame al [phone].    We comply with applicable federal civil rights laws and [place] laws. We do not discriminate on the basis of race, color, national origin, age, disability, sex, sexual orientation, or gender identity.            Thank you!     Thank you for choosing [facility]  for your care. Our goal is always to provide you with excellent care. Hearing back from our patients is one way we can continue to improve our services. Please take a few minutes to complete the written survey that you may receive in the mail after your visit with us. Thank you!             Your Updated Medication List - Protect others around you: Learn how to safely use, store and throw away your medicines at www.disposemymeds.org.          This list is accurate as of 2/6/18 11:45 AM.  Always use your most recent med list.                   Brand Name Dispense Instructions for use Diagnosis    B COMPLEX 1 Tabs      Take  by mouth daily.        buPROPion 100 MG tablet    WELLBUTRIN     Take 100 mg by mouth daily        CITRACAL CALCIUM+D PO      Take 1 tablet by mouth daily        EPINEPHrine 0.3 MG/0.3ML injection 2-pack    EPIPEN/ADRENACLICK/or ANY BX GENERIC EQUIV    0.6 mL    Inject 0.3 mLs (0.3 mg) into the muscle as needed for anaphylaxis    Bee sting reaction, accidental or unintentional, sequela       FISH OIL PO      Take  by mouth daily.        fluorouracil 5 % cream    EFUDEX    40 g    Use  Efudex twice daily for 2-3 weeks or until the onset of irritation.    History of actinic keratosis       GLUCOSAMINE CHONDR COMPLEX PO      Reported on 5/16/2017         lamoTRIgine 100 MG tablet    LaMICtal     Take 150 mg by mouth daily        levonorgestrel 20 MCG/24HR IUD    MIRENA     1 each by Intrauterine route once        levothyroxine 75 MCG tablet    SYNTHROID/LEVOTHROID    90 tablet    Take 1 tablet (75 mcg) by mouth daily    Postsurgical hypothyroidism       MAGNESIUM OXIDE PO      Take 1 tablet by mouth daily        Multiple vitamin Caps      Take  by mouth daily.        tazarotene 0.05 % topical gel    TAZORAC    30 g    Apply a pea sized amount to the face at bedtime.    Acne vulgaris       tiZANidine 4 MG tablet    ZANAFLEX    30 tablet    Take 1 tablet (4 mg) by mouth nightly as needed for muscle spasms    DDD (degenerative disc disease), lumbar, Back muscle spasm       vitamin E 400 UNIT capsule      Take by mouth daily Reported on 5/16/2017

## 2018-02-06 NOTE — NURSING NOTE
"Cynthia Marie Barthel's goals for this visit include:   Chief Complaint   Patient presents with     Thyroid Problem       She requests these members of her care team be copied on today's visit information: Ramakrishna Boyer      PCP: Ramakrishna Boyer    Referring Provider:  No referring provider defined for this encounter.    Chief Complaint   Patient presents with     Thyroid Problem       Initial /67  Pulse 68  Temp 98.1  F (36.7  C)  Ht 1.583 m (5' 2.32\")  Wt 52.6 kg (116 lb)  SpO2 97%  BMI 21 kg/m2 Estimated body mass index is 21 kg/(m^2) as calculated from the following:    Height as of this encounter: 1.583 m (5' 2.32\").    Weight as of this encounter: 52.6 kg (116 lb).  Medication Reconciliation: complete    Do you need any medication refills at today's visit? Yes    Rosie Bryant LPN      "

## 2018-02-06 NOTE — LETTER
2/6/2018         RE: Cynthia Marie Barthel  [address]  [place] [address]        Dear Colleague,    Thank you for referring your patient, Cynthia Marie Barthel, to the [facility]. Please see a copy of my visit note below.      The patient is seen in f/up for thyroid nodules and hypothyroidism.    Today she feels good and she denies any specific complaints with the exception of hot flashes, which become more severe over the last 2-4 months, both during daytime and at night. She has been experiencing occasional hot flashes for over 1 year. For the last 8 years, she hasn't been having menstrual periods, as she had a Mirena IUD placed.   The antidepressive medications were recently changed and she reports feeling much better.     Johanne first discovered a lump on the left side of the neck in the year 2000. Thyroid ultrasound revealed a dominant left thyroid nodule and a few small thyroid nodules present on the right thyroid lobe. The result of the fine needle aspiration of the left nodule was inconclusive. The patient underwent left hemithyroidectomy in November 2000.  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 There was no evidence of malignancy. A single parathyroid gland was removed. After surgery, she was started on levothyroxine. Subsequently, she had f/up thyroid ultrasounds done for a small right nodule in October 2012, December 2012, October 2013. On the most recent ultrasound images, the small colloid looking nodule in the right lobe measured 0.7 cm.    Current levothyroxine replacement dose is 75  g daily.   She denies specific complaints, with the exception of mood swings. The hot flashes are still present, intermittently. They improve with physical exercise.     Past Medical History   Scoliosis - at birth   Fusion of the spine surgery   3 sinus surgeries   Allergies   Thyroid nodules   L hemithyroidectomy   Hypothyroidism post  thyroidectomy   Mild sleep apnea - dx 2015 not using the CPAP machine   IUD for 8 years   Depression since 2 yo, treated since 2001    Current Medications  Prescription Medications as of 2/6/2018             Calcium-Magnesium-Vitamin D (CITRACAL CALCIUM+D PO) Take 1 tablet by mouth daily    MAGNESIUM OXIDE PO Take 1 tablet by mouth daily    levonorgestrel (MIRENA) 20 MCG/24HR IUD 1 each by Intrauterine route once    tiZANidine (ZANAFLEX) 4 MG tablet Take 1 tablet (4 mg) by mouth nightly as needed for muscle spasms    EPINEPHrine (EPIPEN/ADRENACLICK/OR ANY BX GENERIC EQUIV) 0.3 MG/0.3ML injection 2-pack Inject 0.3 mLs (0.3 mg) into the muscle as needed for anaphylaxis    tazarotene (TAZORAC) 0.05 % topical gel Apply a pea sized amount to the face at bedtime.    fluorouracil (EFUDEX) 5 % cream Use  Efudex twice daily for 2-3 weeks or until the onset of irritation.    buPROPion (WELLBUTRIN) 100 MG tablet Take 100 mg by mouth daily    levothyroxine (SYNTHROID/LEVOTHROID) 75 MCG tablet TAKE 1 TABLET (75 MCG) BY MOUTH DAILY    lamoTRIgine (LAMICTAL) 100 MG tablet Take 150 mg by mouth daily    vitamin E 400 UNIT capsule Take by mouth daily Reported on 5/16/2017    Glucosamine-Chondroitin (GLUCOSAMINE CHONDR COMPLEX PO) Reported on 5/16/2017    Omega-3 Fatty Acids (FISH OIL PO) Take  by mouth daily.    Multiple Vitamin CAPS Take  by mouth daily.    B Complex Vitamins (B COMPLEX 1) TABS Take  by mouth daily.        Family History  Father, grandfather, uncles had depression. No thyroid. No diabetes. Great uncles and aunts had colon cancer.  Maternal aunts - osteoporosis.     Social History  . No children. Quit smoking 2 years ago. Quit drinking alcohol a few months ao. Occupation: . Takes MVI, B complex, fish oil, [employer].     Review of Systems   Systemic:              Weight stable, no sign fatigue   Eye:                      No eye symptoms   Krystle-Laryngeal:     No krystle-laryngeal symptoms, no dysphagia, no  "hoarseness, no cough     Breast:                  No breast symptoms  Cardiovascular:    No cardiovascular symptoms, no CP or palpitations   Pulmonary:           No pulmonary symptoms, no SOB or cough    Gastrointestinal:   No diarrhea or constipation   Genitourinary:       no increased thirst, no increased urination   Endocrine:            Has a Mirena IUD  - placed 2013; hot flashes for > one year - more prevalent in the last few months   Neurological:       Rare headaches in am, no tremor, numbness in her back longstaning, no dizziness   Musculoskeletal:  joint pain - hips, knees, neck - with morning stiffness    Skin:                     Mild dry skin, no hair falling out   Psychological:      Depression is controlled.     Vital Signs     Previous Weights:   Wt Readings from Last 10 Encounters:   02/06/18 52.6 kg (116 lb)   11/13/17 50.7 kg (111 lb 11.2 oz)   07/17/17 50.8 kg (112 lb 1.6 oz)   05/16/17 51.3 kg (113 lb)   04/17/17 51.6 kg (113 lb 11.2 oz)   01/10/17 51 kg (112 lb 6.4 oz)   01/06/16 55.5 kg (122 lb 7 oz)   11/11/14 53.5 kg (118 lb)   10/29/13 51.3 kg (113 lb)   12/26/12 53.4 kg (117 lb 11.2 oz)       /67  Pulse 68  Temp 98.1  F (36.7  C)  Ht 1.583 m (5' 2.32\")  Wt 52.6 kg (116 lb)  SpO2 97%  BMI 21 kg/m2      Physical Exam  General Appearance: scoliosis noted;  well nourished and in no distress     Eyes:  conjutivae and extra-ocular motions are normal.                                    pupils round and reactive to light, no lid lag, no stare    HEENT:   oropharynx clear and moist, no JVD, no bruits      unable to palpate the thyroid, palpable small R medial supraclavicular mass, stable   Cardiovascular:  regular rhythm, no murmurs, distal pulse palpable, no edema  Respiratory:        chest clear, no rales, no rhonchi   Musculoskeletal:  normal tone and strength  Psychological:          affect depressed, judgment normal  Skin:  warm, no lesions  Neurological:  reflexes normal and " symmetric, no resting tremor.     Lab Results  Negative thyroglobulin antibody and a thyroglobulin level of 2.2 in November 2009.  9/27/13  FSH 5  Estradiol 68 (Postmenopausal range less than 54.7).    TSH   Date Value Ref Range Status   01/10/2017 3.03 0.40 - 4.00 mU/L Final     T4 Free   Date Value Ref Range Status   01/10/2017 1.29 0.76 - 1.46 ng/dL Final       Assessment/Plan    1. Hypothyroidism secondary to nahomy-thyroidectomy  The patient is, clinically and biochemically, euthyroid.  I recommended to continue to take the same dose of levothyroxine.    2. Right subcentimeter thyroid nodule - with no suspicious features on the prior thyroid USs. We'll continue to monitor clinically.    RTC 1 year.  Orders Placed This Encounter   Procedures     TSH     T4 free                           Again, thank you for allowing me to participate in the care of your patient.        Sincerely,        Gabriela Muir MD

## 2018-02-06 NOTE — LETTER
2/6/2018       RE: Cynthia Marie Barthel  [address]  [place] [address]     Dear Colleague,    Thank you for referring your patient, Cynthia Marie Barthel, to the [facility]. Please see a copy of my visit note below.      The patient is seen in f/up for thyroid nodules and hypothyroidism.    Today she feels good and she denies any specific complaints with the exception of hot flashes, which become more severe over the last 2-4 months, both during daytime and at night. She has been experiencing occasional hot flashes for over 1 year. For the last 8 years, she hasn't been having menstrual periods, as she had a Mirena IUD placed.   The antidepressive medications were recently changed and she reports feeling much better.     Johanne first discovered a lump on the left side of the neck in the year 2000. Thyroid ultrasound revealed a dominant left thyroid nodule and a few small thyroid nodules present on the right thyroid lobe. The result of the fine needle aspiration of the left nodule was inconclusive. The patient underwent left hemithyroidectomy in November 2000.  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 There was no evidence of malignancy. A single parathyroid gland was removed. After surgery, she was started on levothyroxine. Subsequently, she had f/up thyroid ultrasounds done for a small right nodule in October 2012, December 2012, October 2013. On the most recent ultrasound images, the small colloid looking nodule in the right lobe measured 0.7 cm.    Current levothyroxine replacement dose is 75  g daily.   She denies specific complaints, with the exception of mood swings. The hot flashes are still present, intermittently. They improve with physical exercise.     Past Medical History   Scoliosis - at birth   Fusion of the spine surgery   3 sinus surgeries   Allergies   Thyroid nodules   L  hemithyroidectomy   Hypothyroidism post thyroidectomy   Mild sleep apnea - dx 2015 not using the CPAP machine   IUD for 8 years   Depression since 4 yo, treated since 2001    Current Medications  Prescription Medications as of 2/6/2018             Calcium-Magnesium-Vitamin D (CITRACAL CALCIUM+D PO) Take 1 tablet by mouth daily    MAGNESIUM OXIDE PO Take 1 tablet by mouth daily    levonorgestrel (MIRENA) 20 MCG/24HR IUD 1 each by Intrauterine route once    tiZANidine (ZANAFLEX) 4 MG tablet Take 1 tablet (4 mg) by mouth nightly as needed for muscle spasms    EPINEPHrine (EPIPEN/ADRENACLICK/OR ANY BX GENERIC EQUIV) 0.3 MG/0.3ML injection 2-pack Inject 0.3 mLs (0.3 mg) into the muscle as needed for anaphylaxis    tazarotene (TAZORAC) 0.05 % topical gel Apply a pea sized amount to the face at bedtime.    fluorouracil (EFUDEX) 5 % cream Use  Efudex twice daily for 2-3 weeks or until the onset of irritation.    buPROPion (WELLBUTRIN) 100 MG tablet Take 100 mg by mouth daily    levothyroxine (SYNTHROID/LEVOTHROID) 75 MCG tablet TAKE 1 TABLET (75 MCG) BY MOUTH DAILY    lamoTRIgine (LAMICTAL) 100 MG tablet Take 150 mg by mouth daily    vitamin E 400 UNIT capsule Take by mouth daily Reported on 5/16/2017    Glucosamine-Chondroitin (GLUCOSAMINE CHONDR COMPLEX PO) Reported on 5/16/2017    Omega-3 Fatty Acids (FISH OIL PO) Take  by mouth daily.    Multiple Vitamin CAPS Take  by mouth daily.    B Complex Vitamins (B COMPLEX 1) TABS Take  by mouth daily.        Family History  Father, grandfather, uncles had depression. No thyroid. No diabetes. Great uncles and aunts had colon cancer.  Maternal aunts - osteoporosis.     Social History  . No children. Quit smoking 2 years ago. Quit drinking alcohol a few months ao. Occupation: . Takes MVI, B complex, fish oil, [employer].     Review of Systems   Systemic:              Weight stable, no sign fatigue   Eye:                      No eye symptoms   Meche-Laryngeal:     No  "krystle-laryngeal symptoms, no dysphagia, no hoarseness, no cough     Breast:                  No breast symptoms  Cardiovascular:    No cardiovascular symptoms, no CP or palpitations   Pulmonary:           No pulmonary symptoms, no SOB or cough    Gastrointestinal:   No diarrhea or constipation   Genitourinary:       no increased thirst, no increased urination   Endocrine:            Has a Mirena IUD  - placed 2013; hot flashes for > one year - more prevalent in the last few months   Neurological:       Rare headaches in am, no tremor, numbness in her back longstaning, no dizziness   Musculoskeletal:  joint pain - hips, knees, neck - with morning stiffness    Skin:                     Mild dry skin, no hair falling out   Psychological:      Depression is controlled.     Vital Signs     Previous Weights:   Wt Readings from Last 10 Encounters:   02/06/18 52.6 kg (116 lb)   11/13/17 50.7 kg (111 lb 11.2 oz)   07/17/17 50.8 kg (112 lb 1.6 oz)   05/16/17 51.3 kg (113 lb)   04/17/17 51.6 kg (113 lb 11.2 oz)   01/10/17 51 kg (112 lb 6.4 oz)   01/06/16 55.5 kg (122 lb 7 oz)   11/11/14 53.5 kg (118 lb)   10/29/13 51.3 kg (113 lb)   12/26/12 53.4 kg (117 lb 11.2 oz)       /67  Pulse 68  Temp 98.1  F (36.7  C)  Ht 1.583 m (5' 2.32\")  Wt 52.6 kg (116 lb)  SpO2 97%  BMI 21 kg/m2      Physical Exam  General Appearance: scoliosis noted;  well nourished and in no distress     Eyes:  conjutivae and extra-ocular motions are normal.                                    pupils round and reactive to light, no lid lag, no stare    HEENT:   oropharynx clear and moist, no JVD, no bruits      unable to palpate the thyroid, palpable small R medial supraclavicular mass, stable   Cardiovascular:  regular rhythm, no murmurs, distal pulse palpable, no edema  Respiratory:        chest clear, no rales, no rhonchi   Musculoskeletal:  normal tone and strength  Psychological:          affect depressed, judgment normal  Skin:  warm, no " lesions  Neurological:  reflexes normal and symmetric, no resting tremor.     Lab Results  Negative thyroglobulin antibody and a thyroglobulin level of 2.2 in November 2009.  9/27/13  FSH 5  Estradiol 68 (Postmenopausal range less than 54.7).    TSH   Date Value Ref Range Status   01/10/2017 3.03 0.40 - 4.00 mU/L Final     T4 Free   Date Value Ref Range Status   01/10/2017 1.29 0.76 - 1.46 ng/dL Final       Assessment/Plan    1. Hypothyroidism secondary to nahomy-thyroidectomy  The patient is, clinically and biochemically, euthyroid.  I recommended to continue to take the same dose of levothyroxine.    2. Right subcentimeter thyroid nodule - with no suspicious features on the prior thyroid USs. We'll continue to monitor clinically.    RTC 1 year.  Orders Placed This Encounter   Procedures     TSH     T4 free                           Again, thank you for allowing me to participate in the care of your patient.      Sincerely,    Gabriela Muir MD

## 2018-02-06 NOTE — PROGRESS NOTES
The patient is seen in f/up for thyroid nodules and hypothyroidism.    Today she feels good and she denies any specific complaints with the exception of hot flashes, which become more severe over the last 2-4 months, both during daytime and at night. She has been experiencing occasional hot flashes for over 1 year. For the last 8 years, she hasn't been having menstrual periods, as she had a Mirena IUD placed.   The antidepressive medications were recently changed and she reports feeling much better.     Johanne first discovered a lump on the left side of the neck in the year 2000. Thyroid ultrasound revealed a dominant left thyroid nodule and a few small thyroid nodules present on the right thyroid lobe. The result of the fine needle aspiration of the left nodule was inconclusive. The patient underwent left hemithyroidectomy in November 2000.  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 There was no evidence of malignancy. A single parathyroid gland was removed. After surgery, she was started on levothyroxine. Subsequently, she had f/up thyroid ultrasounds done for a small right nodule in October 2012, December 2012, October 2013. On the most recent ultrasound images, the small colloid looking nodule in the right lobe measured 0.7 cm.    Current levothyroxine replacement dose is 75  g daily.   She denies specific complaints, with the exception of mood swings. The hot flashes are still present, intermittently. They improve with physical exercise.     Past Medical History   Scoliosis - at birth   Fusion of the spine surgery   3 sinus surgeries   Allergies   Thyroid nodules   L hemithyroidectomy   Hypothyroidism post thyroidectomy   Mild sleep apnea - dx 2015 not using the CPAP machine   IUD for 8 years   Depression since 2 yo, treated since 2001    Current Medications  Prescription Medications as of 2/6/2018             Calcium-Magnesium-Vitamin D (CITRACAL CALCIUM+D PO)  Take 1 tablet by mouth daily    MAGNESIUM OXIDE PO Take 1 tablet by mouth daily    levonorgestrel (MIRENA) 20 MCG/24HR IUD 1 each by Intrauterine route once    tiZANidine (ZANAFLEX) 4 MG tablet Take 1 tablet (4 mg) by mouth nightly as needed for muscle spasms    EPINEPHrine (EPIPEN/ADRENACLICK/OR ANY BX GENERIC EQUIV) 0.3 MG/0.3ML injection 2-pack Inject 0.3 mLs (0.3 mg) into the muscle as needed for anaphylaxis    tazarotene (TAZORAC) 0.05 % topical gel Apply a pea sized amount to the face at bedtime.    fluorouracil (EFUDEX) 5 % cream Use  Efudex twice daily for 2-3 weeks or until the onset of irritation.    buPROPion (WELLBUTRIN) 100 MG tablet Take 100 mg by mouth daily    levothyroxine (SYNTHROID/LEVOTHROID) 75 MCG tablet TAKE 1 TABLET (75 MCG) BY MOUTH DAILY    lamoTRIgine (LAMICTAL) 100 MG tablet Take 150 mg by mouth daily    vitamin E 400 UNIT capsule Take by mouth daily Reported on 5/16/2017    Glucosamine-Chondroitin (GLUCOSAMINE CHONDR COMPLEX PO) Reported on 5/16/2017    Omega-3 Fatty Acids (FISH OIL PO) Take  by mouth daily.    Multiple Vitamin CAPS Take  by mouth daily.    B Complex Vitamins (B COMPLEX 1) TABS Take  by mouth daily.        Family History  Father, grandfather, uncles had depression. No thyroid. No diabetes. Great uncles and aunts had colon cancer.  Maternal aunts - osteoporosis.     Social History  . No children. Quit smoking 2 years ago. Quit drinking alcohol a few months ao. Occupation: . Takes MVI, B complex, fish oil, [employer].     Review of Systems   Systemic:              Weight stable, no sign fatigue   Eye:                      No eye symptoms   Krystle-Laryngeal:     No krystle-laryngeal symptoms, no dysphagia, no hoarseness, no cough     Breast:                  No breast symptoms  Cardiovascular:    No cardiovascular symptoms, no CP or palpitations   Pulmonary:           No pulmonary symptoms, no SOB or cough    Gastrointestinal:   No diarrhea or constipation  "  Genitourinary:       no increased thirst, no increased urination   Endocrine:            Has a Mirena IUD  - placed 2013; hot flashes for > one year - more prevalent in the last few months   Neurological:       Rare headaches in am, no tremor, numbness in her back longstaning, no dizziness   Musculoskeletal:  joint pain - hips, knees, neck - with morning stiffness    Skin:                     Mild dry skin, no hair falling out   Psychological:      Depression is controlled.     Vital Signs     Previous Weights:   Wt Readings from Last 10 Encounters:   02/06/18 52.6 kg (116 lb)   11/13/17 50.7 kg (111 lb 11.2 oz)   07/17/17 50.8 kg (112 lb 1.6 oz)   05/16/17 51.3 kg (113 lb)   04/17/17 51.6 kg (113 lb 11.2 oz)   01/10/17 51 kg (112 lb 6.4 oz)   01/06/16 55.5 kg (122 lb 7 oz)   11/11/14 53.5 kg (118 lb)   10/29/13 51.3 kg (113 lb)   12/26/12 53.4 kg (117 lb 11.2 oz)       /67  Pulse 68  Temp 98.1  F (36.7  C)  Ht 1.583 m (5' 2.32\")  Wt 52.6 kg (116 lb)  SpO2 97%  BMI 21 kg/m2      Physical Exam  General Appearance: scoliosis noted;  well nourished and in no distress     Eyes:  conjutivae and extra-ocular motions are normal.                                    pupils round and reactive to light, no lid lag, no stare    HEENT:   oropharynx clear and moist, no JVD, no bruits      unable to palpate the thyroid, palpable small R medial supraclavicular mass, stable   Cardiovascular:  regular rhythm, no murmurs, distal pulse palpable, no edema  Respiratory:        chest clear, no rales, no rhonchi   Musculoskeletal:  normal tone and strength  Psychological:          affect depressed, judgment normal  Skin:  warm, no lesions  Neurological:  reflexes normal and symmetric, no resting tremor.     Lab Results  Negative thyroglobulin antibody and a thyroglobulin level of 2.2 in November 2009.  9/27/13  FSH 5  Estradiol 68 (Postmenopausal range less than 54.7).    TSH   Date Value Ref Range Status   01/10/2017 3.03 " 0.40 - 4.00 mU/L Final     T4 Free   Date Value Ref Range Status   01/10/2017 1.29 0.76 - 1.46 ng/dL Final       Assessment/Plan    1. Hypothyroidism secondary to nahomy-thyroidectomy  The patient is, clinically and biochemically, euthyroid.  I recommended to continue to take the same dose of levothyroxine.    2. Right subcentimeter thyroid nodule - with no suspicious features on the prior thyroid USs. We'll continue to monitor clinically.    RTC 1 year.  Orders Placed This Encounter   Procedures     TSH     T4 free

## 2018-02-26 ENCOUNTER — OFFICE VISIT (OUTPATIENT)
Dept: PSYCHOLOGY | Facility: CLINIC | Age: 53
End: 2018-02-26
Payer: COMMERCIAL

## 2018-02-26 DIAGNOSIS — F43.23 ADJUSTMENT DISORDER WITH MIXED ANXIETY AND DEPRESSED MOOD: Primary | ICD-10-CM

## 2018-02-26 PROCEDURE — 90834 PSYTX W PT 45 MINUTES: CPT | Performed by: PSYCHOLOGIST

## 2018-02-26 ASSESSMENT — ANXIETY QUESTIONNAIRES
IF YOU CHECKED OFF ANY PROBLEMS ON THIS QUESTIONNAIRE, HOW DIFFICULT HAVE THESE PROBLEMS MADE IT FOR YOU TO DO YOUR WORK, TAKE CARE OF THINGS AT HOME, OR GET ALONG WITH OTHER PEOPLE: SOMEWHAT DIFFICULT
GAD7 TOTAL SCORE: 10
1. FEELING NERVOUS, ANXIOUS, OR ON EDGE: MORE THAN HALF THE DAYS
3. WORRYING TOO MUCH ABOUT DIFFERENT THINGS: MORE THAN HALF THE DAYS
5. BEING SO RESTLESS THAT IT IS HARD TO SIT STILL: SEVERAL DAYS
7. FEELING AFRAID AS IF SOMETHING AWFUL MIGHT HAPPEN: NOT AT ALL
2. NOT BEING ABLE TO STOP OR CONTROL WORRYING: MORE THAN HALF THE DAYS
6. BECOMING EASILY ANNOYED OR IRRITABLE: SEVERAL DAYS

## 2018-02-26 ASSESSMENT — PATIENT HEALTH QUESTIONNAIRE - PHQ9: 5. POOR APPETITE OR OVEREATING: MORE THAN HALF THE DAYS

## 2018-02-26 NOTE — MR AVS SNAPSHOT
MRN:[id number]                      After Visit Summary   2/26/2018    Cynthia Marie Barthel    MRN: [id number]           Visit Information        Provider Department      2/26/2018 3:00 PM Kary Hough, PhD [facility]      Your next 10 appointments already scheduled     Mar 05, 2018  3:00 PM CST   Return Visit with Kary Hough, PhD   [facility] ([facility])    [address] [place] [id number]   [phone]            Jun 07, 2018 12:15 PM CDT   Return Visit with Dariana Forde MD   [facility] ([facility])    [address] [place] [id number]   [phone]            Feb 12, 2019 11:00 AM CST   LAB with LAB FIRST FLOOR [facility] ([facility])    [address] [place] [id number]   [phone]           Please do not eat 10-12 hours before your appointment if you are coming in fasting for labs on lipids, cholesterol, or glucose (sugar). This does not apply to pregnant women. Water, hot tea and black coffee (with nothing added) are okay. Do not drink other fluids, diet soda or chew gum.            Feb 12, 2019 11:30 AM CST   Return Visit with Gabriela Muir MD   [facility])    [address] [place] [id number]   [phone]              MyChart Information     [employer] gives you secure access to your electronic health record. If you see a primary care provider, you can also send messages to your care team and make appointments. If you have questions, please call your primary care clinic.  If you do not have a primary care provider, please call [phone] and they will assist you.        Care EveryWhere ID     This is your Care EveryWhere ID. This could be used by other organizations  to access your [facility] medical records  [id number]        Equal Access to Services     BLANQUITA WONG : Mely Hebert, rima guzman, anny freeman. So [facility] [phone].    ATENCIÓN: Si habla español, tiene a vo disposición servicios gratuitos de asistencia lingüística. Llame al [phone].    We comply with applicable federal civil rights laws and [place] laws. We do not discriminate on the basis of race, color, national origin, age, disability, sex, sexual orientation, or gender identity.

## 2018-02-26 NOTE — Clinical Note
Hi Dr. Boyer,  I met with Shelley today to begin counseling to address anxiety, depression, and difficult family dynamics. Please let me know if you have questions or concerns about her care.  Thank you! Kary Hough, PhD, LP
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
## 2018-02-27 ASSESSMENT — ANXIETY QUESTIONNAIRES: GAD7 TOTAL SCORE: 10

## 2018-02-27 ASSESSMENT — PATIENT HEALTH QUESTIONNAIRE - PHQ9: SUM OF ALL RESPONSES TO PHQ QUESTIONS 1-9: 12

## 2018-03-03 DIAGNOSIS — E89.0 POSTSURGICAL HYPOTHYROIDISM: ICD-10-CM

## 2018-03-05 RX ORDER — LEVOTHYROXINE SODIUM 75 UG/1
TABLET ORAL
Qty: 90 TABLET | Refills: 3 | OUTPATIENT
Start: 2018-03-05

## 2018-05-15 ENCOUNTER — FCC EXTENDED DOCUMENTATION (OUTPATIENT)
Dept: PSYCHOLOGY | Facility: CLINIC | Age: 53
End: 2018-05-15

## 2018-05-15 DIAGNOSIS — F43.23 ADJUSTMENT DISORDER WITH MIXED ANXIETY AND DEPRESSED MOOD: Primary | ICD-10-CM
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
## 2018-06-07 ENCOUNTER — OFFICE VISIT (OUTPATIENT)
Dept: DERMATOLOGY | Facility: CLINIC | Age: 53
End: 2018-06-07
Payer: COMMERCIAL

## 2018-06-07 DIAGNOSIS — L70.0 ACNE VULGARIS: ICD-10-CM

## 2018-06-07 DIAGNOSIS — Z87.2 HISTORY OF ACTINIC KERATOSIS: ICD-10-CM

## 2018-06-07 DIAGNOSIS — D48.5 NEOPLASM OF UNCERTAIN BEHAVIOR OF SKIN: ICD-10-CM

## 2018-06-07 DIAGNOSIS — L57.8 PHOTOAGING OF SKIN: Primary | ICD-10-CM

## 2018-06-07 PROCEDURE — 88305 TISSUE EXAM BY PATHOLOGIST: CPT | Mod: TC | Performed by: DERMATOLOGY

## 2018-06-07 PROCEDURE — 11100 HC BIOPSY SKIN/SUBQ/MUC MEM, SINGLE LESION: CPT | Performed by: DERMATOLOGY

## 2018-06-07 PROCEDURE — 99213 OFFICE O/P EST LOW 20 MIN: CPT | Mod: 25 | Performed by: DERMATOLOGY

## 2018-06-07 RX ORDER — HYDROQUINONE 40 MG/G
CREAM TOPICAL
Qty: 30 G | Refills: 1 | Status: SHIPPED | OUTPATIENT
Start: 2018-06-07 | End: 2019-06-20

## 2018-06-07 RX ORDER — FLUOROURACIL 50 MG/G
CREAM TOPICAL
Qty: 40 G | Refills: 0 | Status: SHIPPED | OUTPATIENT
Start: 2018-06-07

## 2018-06-07 RX ORDER — TAZAROTENE 0.5 MG/G
GEL TOPICAL
Qty: 30 G | Refills: 11 | Status: SHIPPED | OUTPATIENT
Start: 2018-06-07 | End: 2018-11-28

## 2018-06-07 NOTE — PROGRESS NOTES
[facility] Dermatology Note      Dermatology Problem List:  1. Actinic keratosis  -History of Efudex use with redness of the face and swelling, possible allergy, however recently used without problem.   2. Acne vulgaris  -Current Tx: Tazorac with improvement  3. Sebaceous hyperplasia  -s/p destruction with hyfrecator 1/15/2015  4. Photoaging  -3 vitalize peels by outside provider with no issues but limited improvement.     Encounter Date: Jun 7, 2018    CC:  Chief Complaint   Patient presents with     RECHECK     skin check, mole on left shoulder blade, brown spots not fading         History of Present Illness:  Ms. Cynthia Marie Barthel is a 52 year old female who presents as a follow-up for spot check. She is worried about age spots on the face and skin laxity on the face.  Repots spot on back she though was a tick X 6 months, she feels it come and goes and it is rough. Also rough spots on cheeks.     Past Medical History:   Patient Active Problem List   Diagnosis     Postsurgical hypothyroidism     Giant papillary conjunctivitis     Cerebral gliosis     Cyst of pineal gland     BPV (benign positional vertigo), bilateral     Anxiety and depression     CARDIOVASCULAR SCREENING; LDL GOAL LESS THAN 160     Cervical high risk HPV (human papillomavirus) test positive     Past Medical History:   Diagnosis Date     Arthritis      Cervical high risk HPV (human papillomavirus) test positive 11/13/2017     No past surgical history on file.     Social History:  Reviewed and unchanged but kept in chart for clinician convenience  The patient works as a an .    Family History:  Reviewed and unchanged but kept in chart for clinician convenience  There is an unknown family history of skin cancer in the patient's father.  There is a family history of hay fever.  There is no family history of asthma, psoriasis, or eczema.     Medications:  Current Outpatient Prescriptions   Medication Sig Dispense  Refill     B Complex Vitamins (B COMPLEX 1) TABS Take  by mouth daily.       buPROPion (WELLBUTRIN) 100 MG tablet Take 100 mg by mouth daily       Calcium-Magnesium-Vitamin D (CITRACAL CALCIUM+D PO) Take 1 tablet by mouth daily       EPINEPHrine (EPIPEN/ADRENACLICK/OR ANY BX GENERIC EQUIV) 0.3 MG/0.3ML injection 2-pack Inject 0.3 mLs (0.3 mg) into the muscle as needed for anaphylaxis 0.6 mL 0     fluorouracil (EFUDEX) 5 % cream Use  Efudex twice daily for 2-3 weeks or until the onset of irritation. 40 g 0     Glucosamine-Chondroitin (GLUCOSAMINE CHONDR COMPLEX PO) Reported on 5/16/2017       lamoTRIgine (LAMICTAL) 100 MG tablet Take 150 mg by mouth daily       levonorgestrel (MIRENA) 20 MCG/24HR IUD 1 each by Intrauterine route once       levothyroxine (SYNTHROID/LEVOTHROID) 75 MCG tablet Take 1 tablet (75 mcg) by mouth daily 90 tablet 3     MAGNESIUM OXIDE PO Take 1 tablet by mouth daily       Multiple Vitamin CAPS Take  by mouth daily.       Omega-3 Fatty Acids (FISH OIL PO) Take  by mouth daily.       tazarotene (TAZORAC) 0.05 % topical gel Apply a pea sized amount to the face at bedtime. 30 g 11     tiZANidine (ZANAFLEX) 4 MG tablet Take 1 tablet (4 mg) by mouth nightly as needed for muscle spasms 30 tablet 0     vitamin E 400 UNIT capsule Take by mouth daily Reported on 5/16/2017       Allergies   Allergen Reactions     Bees Swelling     Wasps [Hornets] Swelling     Hay Fever & [A.R.M.]      Review of Systems:  -Const: The patient is generally feeling well today.     Physical exam:  GEN: This is a well developed, well-nourished female in no acute distress, in a pleasant mood.  SKIN: Total skin excluding the undergarment areas was performed. The exam included the head/face, neck, both arms, chest, back, abdomen, both legs, digits and/or nails.   There are scattered light brown macules on sun exposed areas.   5mm keratotic papule, left mid back   -There are skin colored stuck on papules located on the cheeks  X2  - There are well circumscribed, symmetric tan to brown pigmented macules and papules on the trunk and extremities.   -No other lesions of concern on areas examined.     Impression/Plan:  1. History of actinic keratosis, s/p efudex-none today, patient spot treats with Aks appear, renewed  2. Solar lentigines and photoaging and skin laxity    Continue CE Ferulic    Clear & Brilliant or Fraxel    Radiofrequency reviewed and she will consider. We added her to our list    hydroquinone 4% cream, use for up to 4 weeks in a row, then take 4 weeks off    Sunscreen at last an SPF 50    Okay to use Tazorac    Baseline photos obtained.     Crows feet, recommend 12 units of botox  3. Seborrheic keratosis, non irritated, cheeks, declines cosmetic cryo    No further intervention required at this time.   4. Multiple clinically benign nevi on the trunk and extremities    Recommend checking nails for moles between polish changes. Discussed with patient what features to watch    No further intervention required at this time.   5. 5mm keratotic papule, left mid back- sk or other, less likely SCC, pt desires removal, NUB    Shave biopsy:  After discussion of benefits and risks including but not limited to bleeding/bruising, pain/swelling, infection, scar, incomplete removal, nerve damage/numbness, recurrence, and non-diagnostic biopsy, written consent, verbal consent and photographs were obtained. Time-out was performed. The area was cleaned with isopropyl alcohol. 0.5mL of 1% lidocaine with epinephrine was injected to obtain adequate anesthesia of the lesion on the left mid back. A shave biopsy was performed. Hemostasis was achieved with aluminium chloride. Vaseline and a sterile dressing were applied. The patient tolerated the procedure and no complications were noted. The patient was provided with verbal and written post care instructions.     Follow up in 1 year, earlier for new or changing lesions.     Staff  Involved:  Staff    Dariana Forde MD    Department of Dermatology  [facility]: Phone: [phone], Fax:[phone]  [facility]: Phone: [phone], Fax: [phone]

## 2018-06-07 NOTE — NURSING NOTE
Dermatology Rooming Note    Cynthia Marie Barthel's goals for this visit include:   Chief Complaint   Patient presents with     RECHECK     skin check, mole on left shoulder blade, brown spots not fading       Is a scribe okay for this visit:YES    Are records needed for this visit(If yes, obtain release of information): No     Vitals: There were no vitals taken for this visit.    Referring Provider:  No referring provider defined for this encounter.    Alexandria Padron LPN

## 2018-06-07 NOTE — MR AVS SNAPSHOT
After Visit Summary   6/7/2018    Cynthia Marie Barthel    MRN: [id number]           Patient Information     Date Of Birth          1965        Visit Information        Provider Department      6/7/2018 12:15 PM Dariana Forde MD [facility]        Today's Diagnoses     Photoaging of skin    -  1    Acne vulgaris        History of actinic keratosis        Neoplasm of uncertain behavior of skin          Care Instructions    Wound Care After a Biopsy    What is a skin biopsy?  A skin biopsy allows the doctor to examine a very small piece of tissue under the microscope to determine the diagnosis and the best treatment for the skin condition. A local anesthetic (numbing medicine)  is injected with a very small needle into the skin area to be tested. A small piece of skin is taken from the area. Sometimes a suture (stitch) is used.     What are the risks of a skin biopsy?  I will experience scar, bleeding, swelling, pain, crusting and redness. I may experience incomplete removal or recurrence. Risks of this procedure are excessive bleeding, bruising, infection, nerve damage, numbness, thick (hypertrophic or keloidal) scar and non-diagnostic biopsy.    How should I care for my wound for the first 24 hours?    Keep the wound dry and covered for 24 hours    If it bleeds, hold direct pressure on the area for 15 minutes. If bleeding does not stop then go to the emergency room    Avoid strenuous exercise the first 1-2 days or as your doctor instructs you    How should I care for the wound after 24 hours?    After 24 hours, remove the bandage    You may bathe or shower as normal    If you had a scalp biopsy, you can shampoo as usual and can use shower water to clean the biopsy site daily    Clean the wound twice a day with gentle soap and water    Do not scrub, be gentle    Apply white petroleum/Vaseline after cleaning the wound with a cotton swab or a clean finger, and keep the site covered  with a Bandaid /bandage. Bandages are not necessary with a scalp biopsy    If you are unable to cover the site with a Bandaid /bandage, re-apply ointment 2-3 times a day to keep the site moist. Moisture will help with healing    Avoid strenuous activity for first 1-2 days    Avoid lakes, rivers, pools, and oceans until the stitches are removed or the site is healed    How do I clean my wound?    Wash hands thoroughly with soap or use hand  before all wound care    Clean the wound with gentle soap and water    Apply white petroleum/Vaseline  to wound after it is clean    Replace the Bandaid /bandage to keep the wound covered for the first few days or as instructed by your doctor    If you had a scalp biopsy, warm shower water to the area on a daily basis should suffice    What should I use to clean my wound?     Cotton-tipped applicators (Qtips )    White petroleum jelly (Vaseline ). Use a clean new container and use Q-tips to apply.    Bandaids   as needed    Gentle soap     How should I care for my wound long term?    Do not get your wound dirty    Keep up with wound care for one week or until the area is healed.    A small scab will form and fall off by itself when the area is completely healed. The area will be red and will become pink in color as it heals. Sun protection is very important for how your scar will turn out. Sunscreen with an SPF 30 or greater is recommended once the area is healed.      You should have some soreness but it should be mild and slowly go away over several days. Talk to your doctor about using tylenol for pain,    When should I call my doctor?  If you have increased:     Pain or swelling    Pus or drainage (clear or slightly yellow drainage is ok)    Temperature over 100F    Spreading redness or warmth around wound    When will I hear about my results?  The biopsy results can take 2-3 weeks to come back. The clinic will call you with the results, send you a [employer] message, or  have you schedule a follow-up clinic or phone time to discuss the results. Contact our clinics if you do not hear from us in 3 weeks.     Who should I call with questions?    [facility]: [phone]     [facility]: [phone]    For urgent needs outside of business hours call the [facility] at [phone] and ask for the dermatology resident on call          PRECAUTIONS TO CONSIDER BEFORE CLEAR AND BRILLIANT  TREATMENTS  SIX TO TWELVE MONTHS BEFORE TREATMENT    Stop use of Accutane    TWO WEEKS BEFORE TREATMENT    Stop use of all Retinols   o Retin A, Tazorac,  anti-aging  products    Stop use of all glycolic acid treatments (longer if deeper peel)    Stop use of all salicylic acid products    Stop excessive sun exposure 2-4 weeks prior to treatment    Stop waxing    Stop abrasive scrubs    Stop microdermabrasion treatments    Talk to your doctor if you have ever had a cold sore    OTHER PRE-TREATMENT CONSIDERATIONS    Are you pregnant or breastfeeding?   o If yes, you are not a candidate at this time.       Stay Hydrated!  o Drink plenty of water before, during and after your treatment.  This will help improve your healing process.        Tendency to hyperpigment?   o Talk to your doctor about starting a bleaching regiment four to six weeks before your treatment series      Be prepared!  o Two to three weeks pre and post treatment, it is a good idea to avoid excessive sun exposure.  o Broad spectrum (UVA/UVB) sunblock must be worn to protect from sun exposure.      Patients with history of autoimmune Disease (such as Lupus and Rheumatoid arthritis)  o Are not candidates for treatment.        Patients with history of keloid formation      Patients with active bacterial, viral or fungal infections.                 Follow-ups after your visit        Follow-up notes from your care team     Return in about 1 year (around 6/7/2019).      Your next  10 appointments already scheduled     Feb 12, 2019 11:00 AM CST   LAB with LAB FIRST FLOOR [facility]    [id number] [address] [place] [id number] [phone]           Please do not eat 10-12 hours before your appointment if you are coming in fasting for labs on lipids, cholesterol, or glucose (sugar). This does not apply to pregnant women. Water, hot tea and black coffee (with nothing added) are okay. Do not drink other fluids, diet soda or chew gum.            Feb 12, 2019 11:30 AM CST   [facility] with Gab[facility] Muir [facility]   [facility]    [address] [address] [place] [id number] [phone]              Who to contact     If you have questions or need follow up information about today's clinic visit or your schedule please contact [facility] directly at [phone].  Normal or non-critical lab and imaging results will be communicated to you by [employer], letter or phone within 4 business days after the clinic has received the results. If you do not hear from us within 7 days, please contact the clinic through [employer] or phone. If you have a critical or abnormal lab result, we will notify you by phone as soon as possible.  Submit refill requests through [employer] or call your pharmacy and they will forward the refill request to us. Please allow 3 business days for your refill to be completed.          Additional Information About Your Visit        [employer] Information     [employer] gives you secure access to your electronic health record. If you see a primary care provider, you can also send messages to your care team and make appointments. If you have questions, please call your primary care clinic.  If you do not have a primary care provider, please call [phone] and they will assist you.      [employer] is an electronic gateway that provides easy, online access to your  medical records. With [employer], you can request a clinic appointment, read your test results, renew a prescription or communicate with your care team.     To access your existing account, please contact your [facility] or call [phone] for assistance.        Care EveryWhere ID     This is your Care EveryWhere ID. This could be used by other organizations to access your [facility] medical records  [id number]         Blood Pressure from Last 3 Encounters:   02/06/18 102/67   11/13/17 110/60   07/17/17 102/66    Weight from Last 3 Encounters:   02/06/18 52.6 kg (116 lb)   11/13/17 50.7 kg (111 lb 11.2 oz)   07/17/17 50.8 kg (112 lb 1.6 oz)              We Performed the Following     BIOPSY SKIN/SUBQ/MUC MEM, SINGLE LESION     Dermatological path order and indications          Today's Medication Changes          These changes are accurate as of 6/7/18 12:57 PM.  If you have any questions, ask your nurse or doctor.               Start taking these medicines.        Dose/Directions    hydroquinone 4 % Crea   Used for:  Photoaging of skin        Apply a thin layer to the face for up to 4 weeks in a row, take 8 weeks off then repeat as needed   Quantity:  30 g   Refills:  1            Where to get your medicines      These medications were sent to [facility] #[id number] - [place], [place] - [address]  [address], [place] [address]     Phone:  [phone]     fluorouracil 5 % cream    hydroquinone 4 % Crea    tazarotene 0.05 % topical gel                Primary Care Provider Office Phone # Fax #    Ramakrishna Boyer -[phone]       [address]  [facility] [address]        Equal Access to Services     [facility] Campus[facility]ILAH : Hadii heaven Hebert, rima guzman, qaemilio cansecoalmaanny garcia. So [facility] [phone].    ATENCIÓN: Si habla español, tiene a vo disposición servicios gratuitos de asistencia  lingüísticaTanika Flores al [phone].    We comply with applicable federal civil rights laws and [place] laws. We do not discriminate on the basis of race, color, national origin, age, disability, sex, sexual orientation, or gender identity.            Thank you!     Thank you for choosing [facility]  for your care. Our goal is always to provide you with excellent care. Hearing back from our patients is one way we can continue to improve our services. Please take a few minutes to complete the written survey that you may receive in the mail after your visit with us. Thank you!             Your Updated Medication List - Protect others around you: Learn how to safely use, store and throw away your medicines at www.disposemymeds.org.          This list is accurate as of 6/7/18 12:57 PM.  Always use your most recent med list.                   Brand Name Dispense Instructions for use Diagnosis    B COMPLEX 1 Tabs      Take  by mouth daily.        buPROPion 100 MG tablet    WELLBUTRIN     Take 100 mg by mouth daily        CITRACAL CALCIUM+D PO      Take 1 tablet by mouth daily        EPINEPHrine 0.3 MG/0.3ML injection 2-pack    EPIPEN/ADRENACLICK/or ANY BX GENERIC EQUIV    0.6 mL    Inject 0.3 mLs (0.3 mg) into the muscle as needed for anaphylaxis    Bee sting reaction, accidental or unintentional, sequela       FISH OIL PO      Take  by mouth daily.        fluorouracil 5 % cream    EFUDEX    40 g    Use  Efudex twice daily for 2-3 weeks or until the onset of irritation.    History of actinic keratosis       GLUCOSAMINE CHONDR COMPLEX PO      Reported on 5/16/2017        hydroquinone 4 % Crea     30 g    Apply a thin layer to the face for up to 4 weeks in a row, take 8 weeks off then repeat as needed    Photoaging of skin       lamoTRIgine 100 MG tablet    LaMICtal     Take 150 mg by mouth daily        levonorgestrel 20 MCG/24HR IUD    MIRENA     1 each by Intrauterine route once        levothyroxine 75  MCG tablet    SYNTHROID/LEVOTHROID    90 tablet    Take 1 tablet (75 mcg) by mouth daily    Postsurgical hypothyroidism       MAGNESIUM OXIDE PO      Take 1 tablet by mouth daily        Multiple vitamin Caps      Take  by mouth daily.        tazarotene 0.05 % topical gel    TAZORAC    30 g    Apply a pea sized amount to the face at bedtime.    Acne vulgaris       tiZANidine 4 MG tablet    ZANAFLEX    30 tablet    Take 1 tablet (4 mg) by mouth nightly as needed for muscle spasms    DDD (degenerative disc disease), lumbar, Back muscle spasm       vitamin E 400 UNIT capsule      Take by mouth daily Reported on 5/16/2017

## 2018-06-07 NOTE — PATIENT INSTRUCTIONS
Wound Care After a Biopsy    What is a skin biopsy?  A skin biopsy allows the doctor to examine a very small piece of tissue under the microscope to determine the diagnosis and the best treatment for the skin condition. A local anesthetic (numbing medicine)  is injected with a very small needle into the skin area to be tested. A small piece of skin is taken from the area. Sometimes a suture (stitch) is used.     What are the risks of a skin biopsy?  I will experience scar, bleeding, swelling, pain, crusting and redness. I may experience incomplete removal or recurrence. Risks of this procedure are excessive bleeding, bruising, infection, nerve damage, numbness, thick (hypertrophic or keloidal) scar and non-diagnostic biopsy.    How should I care for my wound for the first 24 hours?    Keep the wound dry and covered for 24 hours    If it bleeds, hold direct pressure on the area for 15 minutes. If bleeding does not stop then go to the emergency room    Avoid strenuous exercise the first 1-2 days or as your doctor instructs you    How should I care for the wound after 24 hours?    After 24 hours, remove the bandage    You may bathe or shower as normal    If you had a scalp biopsy, you can shampoo as usual and can use shower water to clean the biopsy site daily    Clean the wound twice a day with gentle soap and water    Do not scrub, be gentle    Apply white petroleum/Vaseline after cleaning the wound with a cotton swab or a clean finger, and keep the site covered with a Bandaid /bandage. Bandages are not necessary with a scalp biopsy    If you are unable to cover the site with a Bandaid /bandage, re-apply ointment 2-3 times a day to keep the site moist. Moisture will help with healing    Avoid strenuous activity for first 1-2 days    Avoid lakes, rivers, pools, and oceans until the stitches are removed or the site is healed    How do I clean my wound?    Wash hands thoroughly with soap or use hand  before all  wound care    Clean the wound with gentle soap and water    Apply white petroleum/Vaseline  to wound after it is clean    Replace the Bandaid /bandage to keep the wound covered for the first few days or as instructed by your doctor    If you had a scalp biopsy, warm shower water to the area on a daily basis should suffice    What should I use to clean my wound?     Cotton-tipped applicators (Qtips )    White petroleum jelly (Vaseline ). Use a clean new container and use Q-tips to apply.    Bandaids   as needed    Gentle soap     How should I care for my wound long term?    Do not get your wound dirty    Keep up with wound care for one week or until the area is healed.    A small scab will form and fall off by itself when the area is completely healed. The area will be red and will become pink in color as it heals. Sun protection is very important for how your scar will turn out. Sunscreen with an SPF 30 or greater is recommended once the area is healed.      You should have some soreness but it should be mild and slowly go away over several days. Talk to your doctor about using tylenol for pain,    When should I call my doctor?  If you have increased:     Pain or swelling    Pus or drainage (clear or slightly yellow drainage is ok)    Temperature over 100F    Spreading redness or warmth around wound    When will I hear about my results?  The biopsy results can take 2-3 weeks to come back. The clinic will call you with the results, send you a [employer] message, or have you schedule a follow-up clinic or phone time to discuss the results. Contact our clinics if you do not hear from us in 3 weeks.     Who should I call with questions?    [facility]: [phone]     [facility]: [phone]    For urgent needs outside of business hours call the [facility] at [phone] and ask for the dermatology resident on call          PRECAUTIONS TO CONSIDER  BEFORE CLEAR AND BRILLIANT  TREATMENTS  SIX TO TWELVE MONTHS BEFORE TREATMENT    Stop use of Accutane    TWO WEEKS BEFORE TREATMENT    Stop use of all Retinols   o Retin A, Tazorac,  anti-aging  products    Stop use of all glycolic acid treatments (longer if deeper peel)    Stop use of all salicylic acid products    Stop excessive sun exposure 2-4 weeks prior to treatment    Stop waxing    Stop abrasive scrubs    Stop microdermabrasion treatments    Talk to your doctor if you have ever had a cold sore    OTHER PRE-TREATMENT CONSIDERATIONS    Are you pregnant or breastfeeding?   o If yes, you are not a candidate at this time.       Stay Hydrated!  o Drink plenty of water before, during and after your treatment.  This will help improve your healing process.        Tendency to hyperpigment?   o Talk to your doctor about starting a bleaching regiment four to six weeks before your treatment series      Be prepared!  o Two to three weeks pre and post treatment, it is a good idea to avoid excessive sun exposure.  o Broad spectrum (UVA/UVB) sunblock must be worn to protect from sun exposure.      Patients with history of autoimmune Disease (such as Lupus and Rheumatoid arthritis)  o Are not candidates for treatment.        Patients with history of keloid formation      Patients with active bacterial, viral or fungal infections.

## 2018-06-07 NOTE — LETTER
6/7/2018         RE: Cynthia Marie Barthel  [address]  [place] [address]        Dear Colleague,    Thank you for referring your patient, Cynthia Marie Barthel, to the [facility]. Please see a copy of my visit note below.    [facility] Dermatology Note      Dermatology Problem List:  1. Actinic keratosis  -History of Efudex use with redness of the face and swelling, possible allergy, however recently used without problem.   2. Acne vulgaris  -Current Tx: Tazorac with improvement  3. Sebaceous hyperplasia  -s/p destruction with hyfrecator 1/15/2015  4. Photoaging  -3 vitalize peels by outside provider with no issues but limited improvement.     Encounter Date: Jun 7, 2018    CC:  Chief Complaint   Patient presents with     RECHECK     skin check, mole on left shoulder blade, brown spots not fading         History of Present Illness:  Ms. Cynthia Marie Barthel is a 52 year old female who presents as a follow-up for spot check. She is worried about age spots on the face and skin laxity on the face.  Repots spot on back she though was a tick X 6 months, she feels it come and goes and it is rough. Also rough spots on cheeks.     Past Medical History:   Patient Active Problem List   Diagnosis     Postsurgical hypothyroidism     Giant papillary conjunctivitis     Cerebral gliosis     Cyst of pineal gland     BPV (benign positional vertigo), bilateral     Anxiety and depression     CARDIOVASCULAR SCREENING; LDL GOAL LESS THAN 160     Cervical high risk HPV (human papillomavirus) test positive     Past Medical History:   Diagnosis Date     Arthritis      Cervical high risk HPV (human papillomavirus) test positive 11/13/2017     No past surgical history on file.     Social History:  Reviewed and unchanged but kept in chart for clinician convenience  The patient works as a an .    Family History:  Reviewed and unchanged but kept in chart for clinician  convenience  There is an unknown family history of skin cancer in the patient's father.  There is a family history of hay fever.  There is no family history of asthma, psoriasis, or eczema.     Medications:  Current Outpatient Prescriptions   Medication Sig Dispense Refill     B Complex Vitamins (B COMPLEX 1) TABS Take  by mouth daily.       buPROPion (WELLBUTRIN) 100 MG tablet Take 100 mg by mouth daily       Calcium-Magnesium-Vitamin D (CITRACAL CALCIUM+D PO) Take 1 tablet by mouth daily       EPINEPHrine (EPIPEN/ADRENACLICK/OR ANY BX GENERIC EQUIV) 0.3 MG/0.3ML injection 2-pack Inject 0.3 mLs (0.3 mg) into the muscle as needed for anaphylaxis 0.6 mL 0     fluorouracil (EFUDEX) 5 % cream Use  Efudex twice daily for 2-3 weeks or until the onset of irritation. 40 g 0     Glucosamine-Chondroitin (GLUCOSAMINE CHONDR COMPLEX PO) Reported on 5/16/2017       lamoTRIgine (LAMICTAL) 100 MG tablet Take 150 mg by mouth daily       levonorgestrel (MIRENA) 20 MCG/24HR IUD 1 each by Intrauterine route once       levothyroxine (SYNTHROID/LEVOTHROID) 75 MCG tablet Take 1 tablet (75 mcg) by mouth daily 90 tablet 3     MAGNESIUM OXIDE PO Take 1 tablet by mouth daily       Multiple Vitamin CAPS Take  by mouth daily.       Omega-3 Fatty Acids (FISH OIL PO) Take  by mouth daily.       tazarotene (TAZORAC) 0.05 % topical gel Apply a pea sized amount to the face at bedtime. 30 g 11     tiZANidine (ZANAFLEX) 4 MG tablet Take 1 tablet (4 mg) by mouth nightly as needed for muscle spasms 30 tablet 0     vitamin E 400 UNIT capsule Take by mouth daily Reported on 5/16/2017       Allergies   Allergen Reactions     Bees Swelling     Wasps [Hornets] Swelling     Hay Fever & [A.R.M.]      Review of Systems:  -Const: The patient is generally feeling well today.     Physical exam:  GEN: This is a well developed, well-nourished female in no acute distress, in a pleasant mood.  SKIN: Total skin excluding the undergarment areas was performed. The exam  included the head/face, neck, both arms, chest, back, abdomen, both legs, digits and/or nails.   There are scattered light brown macules on sun exposed areas.   5mm keratotic papule, left mid back   -There are skin colored stuck on papules located on the cheeks X2  - There are well circumscribed, symmetric tan to brown pigmented macules and papules on the trunk and extremities.   -No other lesions of concern on areas examined.     Impression/Plan:  1. History of actinic keratosis, s/p efudex-none today, patient spot treats with Aks appear, renewed  2. Solar lentigines and photoaging and skin laxity    Continue CE Ferulic    Clear & Brilliant or Fraxel    Radiofrequency reviewed and she will consider. We added her to our list    hydroquinone 4% cream, use for up to 4 weeks in a row, then take 4 weeks off    Sunscreen at last an SPF 50    Okay to use Tazorac    Baseline photos obtained.     Crows feet, recommend 12 units of botox  3. Seborrheic keratosis, non irritated, cheeks, declines cosmetic cryo    No further intervention required at this time.   4. Multiple clinically benign nevi on the trunk and extremities    Recommend checking nails for moles between polish changes. Discussed with patient what features to watch    No further intervention required at this time.   5. 5mm keratotic papule, left mid back- sk or other, less likely SCC, pt desires removal, NUB    Shave biopsy:  After discussion of benefits and risks including but not limited to bleeding/bruising, pain/swelling, infection, scar, incomplete removal, nerve damage/numbness, recurrence, and non-diagnostic biopsy, written consent, verbal consent and photographs were obtained. Time-out was performed. The area was cleaned with isopropyl alcohol. 0.5mL of 1% lidocaine with epinephrine was injected to obtain adequate anesthesia of the lesion on the left mid back. A shave biopsy was performed. Hemostasis was achieved with aluminium chloride. Vaseline and a  sterile dressing were applied. The patient tolerated the procedure and no complications were noted. The patient was provided with verbal and written post care instructions.     Follow up in 1 year, earlier for new or changing lesions.     Staff Involved:  Staff    Dariana Forde MD    Department of Dermatology  [facility]: Phone: [phone], Fax:[phone]  [facility]: Phone: [phone], Fax: [phone]        Again, thank you for allowing me to participate in the care of your patient.        Sincerely,        Dariana Forde MD

## 2018-06-11 LAB — COPATH REPORT: NORMAL

## 2018-08-16 ENCOUNTER — HOSPITAL ENCOUNTER (OUTPATIENT)
Dept: PHYSICAL THERAPY | Facility: CLINIC | Age: 53
Setting detail: THERAPIES SERIES
End: 2018-08-16
Attending: FAMILY MEDICINE
Payer: COMMERCIAL

## 2018-08-16 PROCEDURE — 97161 PT EVAL LOW COMPLEX 20 MIN: CPT | Mod: GP | Performed by: PHYSICAL THERAPIST

## 2018-08-16 PROCEDURE — 95992 CANALITH REPOSITIONING PROC: CPT | Mod: GP | Performed by: PHYSICAL THERAPIST

## 2018-08-16 PROCEDURE — 40000840 ZZHC STATISTIC PT VESTIBULAR VISIT: Performed by: PHYSICAL THERAPIST

## 2018-08-16 NOTE — PROGRESS NOTES
Outpatient Physical Therapy Discharge Note     Patient: Cynthia Marie Barthel  : 1965    Beginning/End Dates of Reporting Period:  2017     Referring Provider: Ramakrishna Boyer MD    Therapy Diagnosis: Dizziness     Client Self Report: Shelley returns to PT after not being seen for two months due to good control of her symptoms.  She has consulted with Neurology with no treatment recommended.  She reports she is beginning to experience mild symtpoms of vertigo again when rolling to her right in the bed and reaching up to turn off the light at night and wtih occasional position changes.  She will be reassessed on positionals today.  She ahs not been doing her habiutaion exs because she has had no symptoms up until the last few days.    Objective Measurements:  Objective Measure: DHI   Details: 20/100               Goals:  Goal Identifier DHI   Goal Description Negar will score <20/100 on the Dizziness Handicap Inventory demonstrating a sginifcant improvement in overall symtpoms.   Target Date 17   Date Met      Progress:Met     Goal Identifier Rolling and bending   Goal Description Negar will be able to bend to  object from floor and roll in bed with no episodes of vertigo for one week.   Target Date 17   Date Met      Progress:Met     Progress Toward Goals:   Not assessed this period.    Plan:  Discharge from therapy.    Discharge:    Reason for Discharge: Patient chooses to discontinue therapy.    Equipment Issued: None    Discharge Plan: Patient to continue home program.

## 2018-08-16 NOTE — ADDENDUM NOTE
Encounter addended by: Carol Redd PT on: 8/16/2018  8:33 AM<BR>     Actions taken: Sign clinical note, Episode resolved

## 2018-08-16 NOTE — PROGRESS NOTES
08/16/18 0800   Quick Adds   Quick Adds Vestibular Eval   Type of Visit Initial OP PT Evaluation   General Information   Start of Care Date 08/16/18   Referring Physician Ramakrishna Boyer MD   Orders Evaluate and Treat as Indicated   Order Date 08/01/18   Medical Diagnosis BPV (benign positional vertigo), bilateral H81.13  - Primary , Dizziness   Pertinent history of current vestibular problem (include personal factors and/or comorbidities that impact the POC)  Depression;Migraines   Pertinent history of current problem (include personal factors and/or comorbidities that impact the POC) Shelley noticed she has started having dizziness spisodes for on and off for the past month, but the past week they have daily.  The episode of vertigo lasts under a minute and she is not able to be mobile during this time.  She is seeing a therapist for her TMJ- she has completed 6 weeks and her right jaw does come out of joint.  She is using heat and anti-inflammatory with ongoing PT treatment.  History of rodding in her spine, TMJ and tensions HAs/migraines.  Denies tinnitus, changes in vision, tingling/numbness.   Diagnostic Tests MRI   MRI Results Results   MRI results Jaw malformation with TMJ   Patient role/Employment history Employed   Living environment [facility]   Patient/Family Goals Statement Resolve dizziness   Fall Risk Screen   Fall screen completed by PT   Have you fallen 2 or more times in the past year? No   Have you fallen and had an injury in the past year? No   Is patient a fall risk? Yes   Fall screen comments Increased risk given dizziness episodes   System Outcome Measures   Outcome Measures BPPV   Dizziness Handicap Inventory (score out of 100) A decrease in score by 17.18 or greater indicates a clinically significant change in symptoms. 30   Pain   Patient currently in pain Yes   Pain location Right jaw   Pain description Ache   Cognitive Status Examination   Follows Commands and Answers Questions 100%  of the time;able to follow multistep instructions   Posture   Posture Normal   Range of Motion (ROM)   ROM Comment WFLs   Strength   Strength Comments WFLs   Bed Mobility   Bed Mobility Comments Limited due to dizziness with rolling   Transfer Skills   Transfer Comments Independent   Gait   Gait Comments Independent with no path deviaiton noted   Balance   Balance Comments Requires UE stabilization with dizziness episode   Sensory Examination   Sensory Perception no deficits were identified   Cervicogenic Screen   Neck ROM End range limitations due to rodding in spine   Vertebral Artery Test Normal   Oculomotor Exam   Smooth Pursuit Normal   Saccades Normal   Infrared Goggle Exam or Frenzel Lense Exam   Vestibular Suppressant in Last 24 Hours? No   Spontaneous Nystagmus Negative   Gaze Evoked Nystagmus Negative   Head Shake Horizontal Nystagmus Negative   Lynn-Hallpike (right) Upbeating R torsional   Lynn-Hallpike (right) comments Brief 1-2 seconds of torsionla movement with closing her eyes so difficult to assess with reports of dizziness   Lynn-Hallpike (Left) Negative   HSCC Supine Roll Test (Right) Negative   HSCC Supine Roll Test (Left) Negative   BPPV Canal(s) R Posterior   BPPV Type Canalithasis   Exam completed with Infrared Goggles   Gaze Evoked Nystagmus comments Possibly 1-2 beats of horizontal nystagmus to the left with left gaze   Dynamic Visual Acuity (DVA)   Static Acuity (LogMar) 0.1   Horizontal Head Movement at 2 Hz (LogMar) 0.3   Planned Therapy Interventions   Planned Therapy Interventions neuromuscular re-education;visual perception;other (see comments)   Planned Therapy Interventions Comment Canalith Repositioning   Clinical Impression   Criteria for Skilled Therapeutic Interventions Met yes, treatment indicated   PT Diagnosis Dizziness, impaired balance   Influenced by the following impairments Impaired balance during episodes of dizziness   Functional limitations due to impairments Decreased  toelrance ot daily activity due to episodes of dizziness with increaed risk for falls.   Clinical Presentation Stable/Uncomplicated   Clinical Presentation Rationale Clinical judgement and assessment   Clinical Decision Making (Complexity) Low complexity   Therapy Frequency 1 time/week   Predicted Duration of Therapy Intervention (days/wks) 4 weeks   Risk & Benefits of therapy have been explained Yes   Patient, Family & other staff in agreement with plan of care Yes   Clinical Impression Comments Negar demonstrates results in line with a right posterior BPPV with treatment provided today.  She will benefit from skilled PT to resolve dizziness with the potential for habituation exs given history of BPPV and possible relationship to headaches.   Education Assessment   Preferred Learning Style Listening   Goal 1   Goal Identifier DHI   Goal Description Negar vanessa score <20/100 on the Dizziness History INventory to demonstrate reduced symtpoms   Target Date 09/16/18   Goal 2   Goal Identifier Rolling and bending   Goal Description Negar will be able to roll in bed and bend over on consistent basis with no symtpoms for a 3 week period.   Target Date 09/16/18   Total Evaluation Time   Total Evaluation Time (Minutes) 25

## 2018-08-24 ENCOUNTER — MYC REFILL (OUTPATIENT)
Dept: DERMATOLOGY | Facility: CLINIC | Age: 53
End: 2018-08-24

## 2018-08-24 DIAGNOSIS — L70.0 ACNE VULGARIS: ICD-10-CM

## 2018-08-24 RX ORDER — TAZAROTENE 0.5 MG/G
GEL TOPICAL
Qty: 30 G | Refills: 11 | Status: CANCELLED | OUTPATIENT
Start: 2018-08-24

## 2018-08-29 ENCOUNTER — TELEPHONE (OUTPATIENT)
Dept: DERMATOLOGY | Facility: CLINIC | Age: 53
End: 2018-08-29

## 2018-08-29 DIAGNOSIS — L70.0 ACNE VULGARIS: Primary | ICD-10-CM

## 2018-08-29 NOTE — TELEPHONE ENCOUNTER
Message from [facility]:  Dariana Forde MD Tue Aug 28, 2018 6:15 PM    Okay for RN to change  ----- Message -----   From: Atiya Barber RN   Sent: 8/24/2018 3:57 PM   To: Dariana Forde MD  Subject: FW: Medication Renewal Request     Ok to switch from tazorac gel to cream?  ----- Message -----   From: Cynthia Marie Barthel   Sent: 8/24/2018 3:39 PM   To: [facility] [place]  Subject: Medication Renewal Request     Original authorizing provider: Ronda Farah, MD Cynthia M. Barthel would like a refill of the following medications:  tazarotene (TAZORAC) 0.05 % topical gel [Dariana Forde MD]    Preferred pharmacy: [id number] - [place], [place] - [address]    Comment:  Dr. Forde, Will you please submit a new prescription for Tazorac CREAM instead of the gel? My face seems to be getting drier and I would like to try the cream. I think my insurance only allows a 30 mg tube. I still use the [facility] [place].    Thanks very much!    Cyndi Barthel

## 2018-08-30 NOTE — TELEPHONE ENCOUNTER
Central Prior Authorization Team   Phone: [phone]      PA Initiation    Medication: Tazorac 0.05 % CREA cream  Insurance Company: [employer] - Phone [phone] Fax [phone]  Pharmacy Filling the Rx: [id number] - [place], [place] - [address]  Filling Pharmacy Phone: [phone]  Filling Pharmacy Fax:    Start Date: 8/30/2018

## 2018-09-04 ENCOUNTER — HOSPITAL ENCOUNTER (OUTPATIENT)
Dept: PHYSICAL THERAPY | Facility: CLINIC | Age: 53
Setting detail: THERAPIES SERIES
End: 2018-09-04
Attending: FAMILY MEDICINE
Payer: COMMERCIAL

## 2018-09-04 PROCEDURE — 40000840 ZZHC STATISTIC PT VESTIBULAR VISIT: Performed by: PHYSICAL THERAPIST

## 2018-09-04 PROCEDURE — 97112 NEUROMUSCULAR REEDUCATION: CPT | Mod: GP | Performed by: PHYSICAL THERAPIST

## 2018-09-07 NOTE — TELEPHONE ENCOUNTER
Okayfor nurse to give 30 gram or 45 grams with 3 refills. Apply a pea sized amount nightly to face, keep away from eyes

## 2018-09-10 ENCOUNTER — TELEPHONE (OUTPATIENT)
Dept: DERMATOLOGY | Facility: CLINIC | Age: 53
End: 2018-09-10

## 2018-09-10 RX ORDER — TAZAROTENE 1 MG/G
CREAM TOPICAL
Qty: 30 G | Refills: 3 | Status: SHIPPED | OUTPATIENT
Start: 2018-09-10 | End: 2019-06-20

## 2018-09-10 NOTE — TELEPHONE ENCOUNTER
Central Prior Authorization Team   Phone: [phone]      PA Initiation Via Phone      Medication: tazarotene 0.1 % CREAM -  Insurance Company: [employer] - Phone [phone] Fax [phone]  Pharmacy Filling the Rx: [id number] - [place], [place] - [address]  Filling Pharmacy Phone: [phone]  Filling Pharmacy Fax:    Start Date: 9/10/2018

## 2018-09-10 NOTE — TELEPHONE ENCOUNTER
PA Request withdrawn and initiated PA via phone rep for the formulary alternative tazarotene 0.1 % CREAM. See new encounter.

## 2018-09-13 DIAGNOSIS — M51.369 DDD (DEGENERATIVE DISC DISEASE), LUMBAR: ICD-10-CM

## 2018-09-13 DIAGNOSIS — M62.830 BACK MUSCLE SPASM: ICD-10-CM

## 2018-09-13 NOTE — TELEPHONE ENCOUNTER
Refill is sent.  Is due for fasting labs/mammogram at [facility] in 11/2018. Please inform to schedule for those too

## 2018-09-13 NOTE — TELEPHONE ENCOUNTER
Routing refill request to provider for review/approval because:  Drug not on the [facility] refill protocol     tiZANidine (ZANAFLEX) 4 MG tablet 30 tablet 0 8/16/2018  No      Sig: TAKE 1 TABLET (4 MG) BY MOUTH NIGHTLY AS NEEDED FOR MUSCLE SPASMS       Last office visit:  11/13/17      Next 5 appointments (look out 90 days)     Nov 14, 2018  3:30 PM CST   [facility]YS[facility] with [facility]er [facility]    [address] [place] [id number]   [phone]                Anika Rothman RN,   [facility] [place]

## 2018-09-13 NOTE — TELEPHONE ENCOUNTER
Patient has scheduled annual physical appointment with PCP on 11/14/18 at 3:30 PM.    Per provider, patient is also due for fasting labs and a mammogram at that time. Will route encounter to Procedure Coordinator to call patient to coordinate all appointments, if able.  Clarita Brock CMA

## 2018-09-14 NOTE — TELEPHONE ENCOUNTER
Patient is scheduled for fasting labs.    Unsure of why patient needs to do mammo since last screening mammo was done in 2/2018.  Routing back to Primary Care for clarification.    Perla Williamson  Primary Care   [facility] [place]

## 2018-09-18 NOTE — TELEPHONE ENCOUNTER
Prior Authorization Approval    Authorization Effective Date: 8/15/2018  Authorization Expiration Date: 9/15/2023  Medication: tazarotene 0.1 % CREAM - APPROVED  Approved Dose/Quantity:   Reference #: [id number]   Insurance Company: [employer] - Phone [phone] Fax [phone]  Expected CoPay:       CoPay Card Available:      Foundation Assistance Needed:    Which Pharmacy is filling the prescription (Not needed for infusion/clinic administered): [id number] - [place] - [address] [place] [address] AVE   Pharmacy Notified:    Patient Notified:

## 2018-11-12 ENCOUNTER — DOCUMENTATION ONLY (OUTPATIENT)
Dept: LAB | Facility: CLINIC | Age: 53
End: 2018-11-12

## 2018-11-12 DIAGNOSIS — Z13.1 SCREENING FOR DIABETES MELLITUS (DM): ICD-10-CM

## 2018-11-12 DIAGNOSIS — Z13.0 SCREENING FOR DEFICIENCY ANEMIA: ICD-10-CM

## 2018-11-12 DIAGNOSIS — Z13.6 CARDIOVASCULAR SCREENING; LDL GOAL LESS THAN 160: Primary | ICD-10-CM

## 2018-11-12 DIAGNOSIS — Z00.00 ROUTINE GENERAL MEDICAL EXAMINATION AT A HEALTH CARE FACILITY: ICD-10-CM

## 2018-11-12 NOTE — PROGRESS NOTES
Labs pended. Routed to PCP to review and order.    Anika Rothamn RN,   [facility]

## 2018-11-14 ENCOUNTER — OFFICE VISIT (OUTPATIENT)
Dept: PEDIATRICS | Facility: CLINIC | Age: 53
End: 2018-11-14
Payer: COMMERCIAL

## 2018-11-14 VITALS
SYSTOLIC BLOOD PRESSURE: 100 MMHG | WEIGHT: 108.5 LBS | DIASTOLIC BLOOD PRESSURE: 59 MMHG | HEART RATE: 68 BPM | HEIGHT: 62 IN | BODY MASS INDEX: 19.96 KG/M2 | OXYGEN SATURATION: 99 % | TEMPERATURE: 98.5 F

## 2018-11-14 DIAGNOSIS — Z13.1 SCREENING FOR DIABETES MELLITUS (DM): ICD-10-CM

## 2018-11-14 DIAGNOSIS — Z13.0 SCREENING FOR DEFICIENCY ANEMIA: ICD-10-CM

## 2018-11-14 DIAGNOSIS — Z23 NEED FOR PROPHYLACTIC VACCINATION AND INOCULATION AGAINST INFLUENZA: ICD-10-CM

## 2018-11-14 DIAGNOSIS — F41.9 ANXIETY AND DEPRESSION: ICD-10-CM

## 2018-11-14 DIAGNOSIS — Z00.01 ENCOUNTER FOR GENERAL ADULT MEDICAL EXAMINATION WITH ABNORMAL FINDINGS: Primary | ICD-10-CM

## 2018-11-14 DIAGNOSIS — Z87.39 H/O SCOLIOSIS: ICD-10-CM

## 2018-11-14 DIAGNOSIS — M26.601 DISORDER OF RIGHT TEMPOROMANDIBULAR JOINT: ICD-10-CM

## 2018-11-14 DIAGNOSIS — E89.0 POSTSURGICAL HYPOTHYROIDISM: ICD-10-CM

## 2018-11-14 DIAGNOSIS — Z12.11 SCREENING FOR COLON CANCER: ICD-10-CM

## 2018-11-14 DIAGNOSIS — Z00.00 ROUTINE GENERAL MEDICAL EXAMINATION AT A HEALTH CARE FACILITY: ICD-10-CM

## 2018-11-14 DIAGNOSIS — F32.A ANXIETY AND DEPRESSION: ICD-10-CM

## 2018-11-14 DIAGNOSIS — Z23 NEED FOR TDAP VACCINATION: ICD-10-CM

## 2018-11-14 DIAGNOSIS — R87.810 CERVICAL HIGH RISK HPV (HUMAN PAPILLOMAVIRUS) TEST POSITIVE: ICD-10-CM

## 2018-11-14 DIAGNOSIS — Z30.432 ENCOUNTER FOR IUD REMOVAL: ICD-10-CM

## 2018-11-14 DIAGNOSIS — Z13.6 CARDIOVASCULAR SCREENING; LDL GOAL LESS THAN 160: ICD-10-CM

## 2018-11-14 LAB
ALBUMIN SERPL-MCNC: 3.9 G/DL (ref 3.4–5)
ALP SERPL-CCNC: 98 U/L (ref 40–150)
ALT SERPL W P-5'-P-CCNC: 26 U/L (ref 0–50)
ANION GAP SERPL CALCULATED.3IONS-SCNC: 5 MMOL/L (ref 3–14)
AST SERPL W P-5'-P-CCNC: 19 U/L (ref 0–45)
BASOPHILS # BLD AUTO: 0.1 10E9/L (ref 0–0.2)
BASOPHILS NFR BLD AUTO: 1.1 %
BILIRUB SERPL-MCNC: 0.5 MG/DL (ref 0.2–1.3)
BUN SERPL-MCNC: 10 MG/DL (ref 7–30)
CALCIUM SERPL-MCNC: 9 MG/DL (ref 8.5–10.1)
CHLORIDE SERPL-SCNC: 108 MMOL/L (ref 94–109)
CHOLEST SERPL-MCNC: 228 MG/DL
CO2 SERPL-SCNC: 29 MMOL/L (ref 20–32)
CREAT SERPL-MCNC: 0.79 MG/DL (ref 0.52–1.04)
DIFFERENTIAL METHOD BLD: NORMAL
EOSINOPHIL # BLD AUTO: 0.2 10E9/L (ref 0–0.7)
EOSINOPHIL NFR BLD AUTO: 3.1 %
ERYTHROCYTE [DISTWIDTH] IN BLOOD BY AUTOMATED COUNT: 13.2 % (ref 10–15)
GFR SERPL CREATININE-BSD FRML MDRD: 76 ML/MIN/1.7M2
GLUCOSE SERPL-MCNC: 87 MG/DL (ref 70–99)
HCT VFR BLD AUTO: 36.6 % (ref 35–47)
HDLC SERPL-MCNC: 112 MG/DL
HGB BLD-MCNC: 11.7 G/DL (ref 11.7–15.7)
IMM GRANULOCYTES # BLD: 0 10E9/L (ref 0–0.4)
IMM GRANULOCYTES NFR BLD: 0.2 %
LDLC SERPL CALC-MCNC: 106 MG/DL
LYMPHOCYTES # BLD AUTO: 2.9 10E9/L (ref 0.8–5.3)
LYMPHOCYTES NFR BLD AUTO: 45.4 %
MCH RBC QN AUTO: 28.3 PG (ref 26.5–33)
MCHC RBC AUTO-ENTMCNC: 32 G/DL (ref 31.5–36.5)
MCV RBC AUTO: 89 FL (ref 78–100)
MONOCYTES # BLD AUTO: 0.4 10E9/L (ref 0–1.3)
MONOCYTES NFR BLD AUTO: 6.6 %
NEUTROPHILS # BLD AUTO: 2.8 10E9/L (ref 1.6–8.3)
NEUTROPHILS NFR BLD AUTO: 43.6 %
NONHDLC SERPL-MCNC: 116 MG/DL
PLATELET # BLD AUTO: 315 10E9/L (ref 150–450)
POTASSIUM SERPL-SCNC: 3.6 MMOL/L (ref 3.4–5.3)
PROT SERPL-MCNC: 7.6 G/DL (ref 6.8–8.8)
RBC # BLD AUTO: 4.13 10E12/L (ref 3.8–5.2)
SODIUM SERPL-SCNC: 142 MMOL/L (ref 133–144)
TRIGL SERPL-MCNC: 49 MG/DL
WBC # BLD AUTO: 6.4 10E9/L (ref 4–11)

## 2018-11-14 PROCEDURE — 80061 LIPID PANEL: CPT | Performed by: FAMILY MEDICINE

## 2018-11-14 PROCEDURE — 36415 COLL VENOUS BLD VENIPUNCTURE: CPT | Performed by: FAMILY MEDICINE

## 2018-11-14 PROCEDURE — 90682 RIV4 VACC RECOMBINANT DNA IM: CPT | Performed by: FAMILY MEDICINE

## 2018-11-14 PROCEDURE — 99396 PREV VISIT EST AGE 40-64: CPT | Mod: 25 | Performed by: FAMILY MEDICINE

## 2018-11-14 PROCEDURE — 80053 COMPREHEN METABOLIC PANEL: CPT | Performed by: FAMILY MEDICINE

## 2018-11-14 PROCEDURE — 90471 IMMUNIZATION ADMIN: CPT | Performed by: FAMILY MEDICINE

## 2018-11-14 PROCEDURE — 90472 IMMUNIZATION ADMIN EACH ADD: CPT | Performed by: FAMILY MEDICINE

## 2018-11-14 PROCEDURE — 90715 TDAP VACCINE 7 YRS/> IM: CPT | Performed by: FAMILY MEDICINE

## 2018-11-14 PROCEDURE — 85025 COMPLETE CBC W/AUTO DIFF WBC: CPT | Performed by: FAMILY MEDICINE

## 2018-11-14 RX ORDER — CLONIDINE HYDROCHLORIDE 0.1 MG/1
0.1 TABLET ORAL 2 TIMES DAILY
COMMUNITY
End: 2019-06-20

## 2018-11-14 ASSESSMENT — PAIN SCALES - GENERAL: PAINLEVEL: NO PAIN (0)

## 2018-11-14 NOTE — PROGRESS NOTES
SUBJECTIVE:   CC: Cynthia Marie Barthel is an 53 year old woman who presents for preventive health visit.     Physical   Annual:     Getting at least 3 servings of Calcium per day:  NO    Bi-annual eye exam:  Yes    Dental care twice a year:  Yes    Sleep apnea or symptoms of sleep apnea:  None    Diet:  Regular (no restrictions)    Frequency of exercise:  2-3 days/week    Duration of exercise:  45-60 minutes    Taking medications regularly:  Yes    Medication side effects:  None    Additional concerns today:  Yes    Immunizations - patient would like tetanus and flu vaccine today    Colonoscopy - review colonoscopy frequency, patient states maternal cousin  of colon cancer. 6 of 9 maternal uncles and aunts with colon cancer also. Would prefer female provider referral    Depression Follow up - patient reports this is being managed at [facility]    Scoliosis - would like to discuss seeing orthopedic physician, would prefer female provider referral    Health Update - recently diagnosed TMJ 2018          Today's PHQ-2 Score:   PHQ-2 (  [employer]) 2018   Q1: Little interest or pleasure in doing things 0   Q2: Feeling down, depressed or hopeless 0   PHQ-2 Score 0   Q1: Little interest or pleasure in doing things Not at all   Q2: Feeling down, depressed or hopeless Not at all   PHQ-2 Score 0       Abuse: Current or Past(Physical, Sexual or Emotional)- No  Do you feel safe in your environment - Yes    Social History   Substance Use Topics     Smoking status: Former Smoker     Types: Cigarettes     Quit date: 2/10/2014     Smokeless tobacco: Never Used     Alcohol use No      Comment: one per day     Alcohol Use 2018   If you drink alcohol do you typically have greater than 3 drinks per day OR greater than 7 drinks per week? Not Applicable       Reviewed orders with patient.  Reviewed health maintenance and updated orders accordingly - Yes  Labs reviewed in EPIC  BP Readings from Last 3  Encounters:   11/14/18 100/59   02/06/18 102/67   11/13/17 110/60    Wt Readings from Last 3 Encounters:   11/14/18 108 lb 8 oz (49.2 kg)   02/06/18 116 lb (52.6 kg)   11/13/17 111 lb 11.2 oz (50.7 kg)                  Patient Active Problem List   Diagnosis     Postsurgical hypothyroidism     Giant papillary conjunctivitis     Cerebral gliosis     Cyst of pineal gland     BPV (benign positional vertigo), bilateral     Anxiety and depression     CARDIOVASCULAR SCREENING; LDL GOAL LESS THAN 160     Cervical high risk HPV (human papillomavirus) test positive     H/O scoliosis     Disorder of right temporomandibular joint     No past surgical history on file.    Social History   Substance Use Topics     Smoking status: Former Smoker     Types: Cigarettes     Quit date: 2/10/2014     Smokeless tobacco: Never Used     Alcohol use No      Comment: one per day     Family History   Problem Relation Age of Onset     Hypertension Mother      Substance Abuse Mother      Depression Father      Substance Abuse Father      Cerebrovascular Disease Maternal Grandfather      Hypertension Maternal Aunt      Glaucoma Maternal Aunt      Macular Degeneration Maternal Aunt      Cataracts Maternal Aunt      Hypertension Maternal Uncle      Hypertension Paternal Aunt      Diabetes Paternal Uncle      Hypertension Paternal Uncle      Cancer No family hx of      Thyroid Disease No family hx of          Current Outpatient Prescriptions   Medication Sig Dispense Refill     B Complex Vitamins (B COMPLEX 1) TABS Take  by mouth daily.       Calcium-Magnesium-Vitamin D (CITRACAL CALCIUM+D PO) Take 1 tablet by mouth daily       EPINEPHrine (EPIPEN/ADRENACLICK/OR ANY BX GENERIC EQUIV) 0.3 MG/0.3ML injection 2-pack INJECT 0.3 MLS (0.3 MG) INTO THE MUSCLE AS NEEDED FOR ANAPHYLAXIS 0.6 mL 1     fluorouracil (EFUDEX) 5 % cream Use  Efudex twice daily for 2-3 weeks or until the onset of irritation. 40 g 0     Glucosamine-Chondroitin (GLUCOSAMINE CHONDR  COMPLEX PO) Reported on 5/16/2017       lamoTRIgine (LAMICTAL) 100 MG tablet Take 200 mg by mouth daily        levonorgestrel (MIRENA) 20 MCG/24HR IUD 1 each by Intrauterine route once       levothyroxine (SYNTHROID/LEVOTHROID) 75 MCG tablet Take 1 tablet (75 mcg) by mouth daily 90 tablet 3     MAGNESIUM OXIDE PO Take 1 tablet by mouth daily       Multiple Vitamin CAPS Take  by mouth daily.       Omega-3 Fatty Acids (FISH OIL PO) Take  by mouth daily.       tazarotene 0.1 % CREA Apply a pea-sized amount nightly to face. Keep away from eyes. 30 g 3     tiZANidine (ZANAFLEX) 4 MG tablet TAKE 1 TABLET (4 MG) BY MOUTH NIGHTLY AS NEEDED FOR MUSCLE SPASMS 30 tablet 0     vitamin E 400 UNIT capsule Take by mouth daily Reported on 5/16/2017       buPROPion (WELLBUTRIN) 100 MG tablet Take 100 mg by mouth daily       cloNIDine (CATAPRES) 0.1 MG tablet Take 0.1 mg by mouth 2 times daily       hydroquinone 4 % CREA Apply a thin layer to the face for up to 4 weeks in a row, take 8 weeks off then repeat as needed (Patient not taking: Reported on 11/14/2018) 30 g 1     tazarotene (TAZORAC) 0.05 % topical gel Apply a pea sized amount to the face at bedtime. (Patient not taking: Reported on 11/14/2018) 30 g 11     Allergies   Allergen Reactions     Bees Swelling     Wasps [Hornets] Swelling     Hay Fever & [A.R.M.]      Recent Labs   Lab Test  11/14/18   0847  02/06/18   1046  11/13/17   1518  01/10/17   1044   LDL  106*   --   110*   --    HDL  112   --   138   --    TRIG  49   --   40   --    ALT  26   --   28   --    CR  0.79   --   0.78   --    GFRESTIMATED  76   --   77   --    GFRESTBLACK  >90   --   >90   --    POTASSIUM  3.6   --   3.8   --    TSH   --   1.71   --   3.03        Patient over age 50, mutual decision to screen reflected in health maintenance.    Pertinent mammograms are reviewed under the imaging tab.  History of abnormal Pap smear: YES - updated in Problem List and Health Maintenance accordingly  PAP / HPV  "Latest Ref Rng & Units 11/13/2017   PAP - NIL   HPV 16 DNA NEG:Negative Negative   HPV 18 DNA NEG:Negative Negative   OTHER HR HPV NEG:Negative Positive(A)     Reviewed and updated as needed this visit by clinical staff  Tobacco  Allergies  Meds  Soc Hx        Reviewed and updated as needed this visit by Provider          Past Medical History:   Diagnosis Date     Arthritis      Cervical high risk HPV (human papillomavirus) test positive 11/13/2017      No past surgical history on file.  Obstetric History     No data available          Review of Systems  CONSTITUTIONAL: NEGATIVE for fever, chills, change in weight  INTEGUMENTARY/SKIN: NEGATIVE for worrisome rashes, moles or lesions  EYES: NEGATIVE for vision changes or irritation  ENT: NEGATIVE for ear, mouth and throat problems  RESP: NEGATIVE for significant cough or SOB  BREAST: NEGATIVE for masses, tenderness or discharge  CV: NEGATIVE for chest pain, palpitations or peripheral edema  GI: NEGATIVE for nausea, abdominal pain, heartburn, or change in bowel habits  GI: Intermittent epigastric pain for the past 1 year with no other associated concerning symptoms  : NEGATIVE for unusual urinary or vaginal symptoms. No vaginal bleeding.  MUSCULOSKELETAL: TMJ disorder on the right side  NEURO: NEGATIVE for weakness, dizziness or paresthesias  ENDOCRINE: NEGATIVE for temperature intolerance, skin/hair changes  ENDOCRINE: History of postsurgical hypothyroidism  HEME/ALLERGY/IMMUNE: NEGATIVE for bleeding problems  PSYCHIATRIC: NEGATIVE for changes in mood or affect  PSYCHIATRIC: History of depression and anxiety     OBJECTIVE:   /59 (BP Location: Right arm, Patient Position: Sitting, Cuff Size: Adult Regular)  Pulse 68  Temp 98.5  F (36.9  C) (Oral)  Ht 5' 2\" (1.575 m)  Wt 108 lb 8 oz (49.2 kg)  SpO2 99%  BMI 19.84 kg/m2  Physical Exam  GENERAL APPEARANCE: healthy, alert and no distress  EYES: Eyes grossly normal to inspection, PERRL and conjunctivae and " sclerae normal  HENT: ear canals and TM's normal, nose and mouth without ulcers or lesions, oropharynx clear and oral mucous membranes moist  NECK: no adenopathy, no asymmetry, masses, or scars and thyroid normal to palpation  RESP: lungs clear to auscultation - no rales, rhonchi or wheezes  BREAST: normal without masses, tenderness or nipple discharge and no palpable axillary masses or adenopathy  CV: regular rate and rhythm, normal S1 S2, no S3 or S4, no murmur, click or rub, no peripheral edema and peripheral pulses strong  ABDOMEN: soft, nontender, no hepatosplenomegaly, no masses and bowel sounds normal   (female): normal female external genitalia, normal urethral meatus, vaginal mucosal atrophy noted, normal cervix, adnexae, and uterus without masses. and IUD threads seen at the cervical loss  MS: no musculoskeletal defects are noted and gait is age appropriate without ataxia  SKIN: no suspicious lesions or rashes  NEURO: Normal strength and tone, sensory exam grossly normal, mentation intact and speech normal  PSYCH: mentation appears normal and affect normal/bright    Diagnostic Test Results:  Results for orders placed or performed in visit on 11/14/18 (from the past 24 hour(s))   Lipid panel reflex to direct LDL Fasting   Result Value Ref Range    Cholesterol 228 (H) <200 mg/dL    Triglycerides 49 <150 mg/dL    HDL Cholesterol 112 >49 mg/dL    LDL Cholesterol Calculated 106 (H) <100 mg/dL    Non HDL Cholesterol 116 <130 mg/dL   CBC with platelets differential   Result Value Ref Range    WBC 6.4 4.0 - 11.0 10e9/L    RBC Count 4.13 3.8 - 5.2 10e12/L    Hemoglobin 11.7 11.7 - 15.7 g/dL    Hematocrit 36.6 35.0 - 47.0 %    MCV 89 78 - 100 fl    MCH 28.3 26.5 - 33.0 pg    MCHC 32.0 31.5 - 36.5 g/dL    RDW 13.2 10.0 - 15.0 %    Platelet Count 315 150 - 450 10e9/L    % Neutrophils 43.6 %    % Lymphocytes 45.4 %    % Monocytes 6.6 %    % Eosinophils 3.1 %    % Basophils 1.1 %    % Immature Granulocytes 0.2 %     Absolute Neutrophil 2.8 1.6 - 8.3 10e9/L    Absolute Lymphocytes 2.9 0.8 - 5.3 10e9/L    Absolute Monocytes 0.4 0.0 - 1.3 10e9/L    Absolute Eosinophils 0.2 0.0 - 0.7 10e9/L    Absolute Basophils 0.1 0.0 - 0.2 10e9/L    Abs Immature Granulocytes 0.0 0 - 0.4 10e9/L    Diff Method Automated Method    **Comprehensive metabolic panel FUTURE anytime   Result Value Ref Range    Sodium 142 133 - 144 mmol/L    Potassium 3.6 3.4 - 5.3 mmol/L    Chloride 108 94 - 109 mmol/L    Carbon Dioxide 29 20 - 32 mmol/L    Anion Gap 5 3 - 14 mmol/L    Glucose 87 70 - 99 mg/dL    Urea Nitrogen 10 7 - 30 mg/dL    Creatinine 0.79 0.52 - 1.04 mg/dL    GFR Estimate 76 >60 mL/min/1.7m2    GFR Estimate If Black >90 >60 mL/min/1.7m2    Calcium 9.0 8.5 - 10.1 mg/dL    Bilirubin Total 0.5 0.2 - 1.3 mg/dL    Albumin 3.9 3.4 - 5.0 g/dL    Protein Total 7.6 6.8 - 8.8 g/dL    Alkaline Phosphatase 98 40 - 150 U/L    ALT 26 0 - 50 U/L    AST 19 0 - 45 U/L       ASSESSMENT/PLAN:   1. Encounter for general adult medical examination with abnormal findings  Discussed on regular exercises, daily calcium intake, healthy eating, self breast exams monthly and routine dental checks  - GASTROENTEROLOGY ADULT REF PROCEDURE ONLY [facility] [phone]; No Provider Preference    2. Encounter for IUD removal  Attempted IUD removal with the sponge-holding forceps  It was very difficult to remove the IUD since this time of the IUD seemed to be stuck at the tight cervical os  Patient is nulliparous and postmenopausal  No abnormal bleeding or discharge noted  Recommended patient to get it removed from the OB/GYN  - OB/GYN REFERRAL    3. Screening for colon cancer    - GASTROENTEROLOGY ADULT REF PROCEDURE ONLY [facility] [phone]; No Provider Preference    4. Need for Tdap vaccination    - TDAP VACCINE (ADACEL)    5. Cervical high risk HPV (human papillomavirus) test positive  As above in problem #2  - OB/GYN REFERRAL    6. H/O scoliosis  Patient wants  "to see a spine surgeon to talk about her surgery for scoliosis, she wants to see a female spinal surgeon  Recommended patient to check with her insurance to know more about the female providers specialist and spine surgery and then call provider for specific referral  Patient verbalised understanding and is agreeable to the plan.      7. CARDIOVASCULAR SCREENING; LDL GOAL LESS THAN 160  LDL Cholesterol Calculated   Date Value Ref Range Status   11/14/2018 106 (H) <100 mg/dL Final     Comment:     Above desirable:  100-129 mg/dl  Borderline High:  130-159 mg/dL  High:             160-189 mg/dL  Very high:       >189 mg/dl           8. Anxiety and depression  Continue to follow-up with [facility] for mood disorder    9. Postsurgical hypothyroidism  Patient follows up with endocrinology    10. Disorder of right temporomandibular joint  Patient has MRI of the TMJ at home.  She will send us a copy for records      COUNSELING:  Reviewed preventive health counseling, as reflected in patient instructions  Special attention given to:        Regular exercise       Healthy diet/nutrition       Vision screening       Immunizations    Vaccinated for: Influenza and TDAP             Osteoporosis Prevention/Bone Health       Colon cancer screening       (Christine)menopause management       The ASCVD Risk score (Franklin TAVIA Jr, et al., 2013) failed to calculate for the following reasons:    The valid HDL cholesterol range is 20 to 100 mg/dL       Advance Care Planning    BP Readings from Last 1 Encounters:   11/14/18 100/59     Estimated body mass index is 19.84 kg/(m^2) as calculated from the following:    Height as of this encounter: 5' 2\" (1.575 m).    Weight as of this encounter: 108 lb 8 oz (49.2 kg).           reports that she quit smoking about 4 years ago. Her smoking use included Cigarettes. She has never used smokeless tobacco.      Counseling Resources:  ATP IV Guidelines  Pooled Cohorts Equation Calculator  Breast Cancer " Risk Calculator  FRAX Risk Assessment  ICSI Preventive Guidelines  Dietary Guidelines for Americans, 2010  USDA's MyPlate  ASA Prophylaxis  Lung CA Screening    Ramakrishna Boyer MD  [facility]  Chart documentation done in part with Dragon Voice recognition Software. Although reviewed after completion, some word and grammatical error may remain.

## 2018-11-14 NOTE — PROGRESS NOTES

## 2018-11-14 NOTE — MR AVS SNAPSHOT
After Visit Summary   11/14/2018    Cynthia Marie Barthel    MRN: [id number]           Patient Information     Date Of Birth          1965        Visit Information        Provider Department      11/14/2018 3:30 PM Ramakrishna Boyer MD [facility]        Today's Diagnoses     Encounter for general adult medical examination with abnormal findings    -  1    Encounter for IUD removal        Screening for colon cancer        Need for Tdap vaccination        Cervical high risk HPV (human papillomavirus) test positive        H/O scoliosis        CARDIOVASCULAR SCREENING; LDL GOAL LESS THAN 160        Anxiety and depression        Postsurgical hypothyroidism          Care Instructions    Get the TDAP shot today  Get the flu shot today    Schedule for colonoscopy with Dr. Toure  Schedule for Gyn consult for IUD removal/pap       Preventive Health Recommendations  Female Ages 50 - 64    Yearly exam: See your health care provider every year in order to  o Review health changes.   o Discuss preventive care.    o Review your medicines if your doctor has prescribed any.      Get a Pap test every three years (unless you have an abnormal result and your provider advises testing more often).    If you get Pap tests with HPV test, you only need to test every 5 years, unless you have an abnormal result.     You do not need a Pap test if your uterus was removed (hysterectomy) and you have not had cancer.    You should be tested each year for STDs (sexually transmitted diseases) if you're at risk.     Have a mammogram every 1 to 2 years.    Have a colonoscopy at age 50, or have a yearly FIT test (stool test). These exams screen for colon cancer.      Have a cholesterol test every 5 years, or more often if advised.    Have a diabetes test (fasting glucose) every three years. If you are at risk for diabetes, you should have this test more often.     If you are at risk for osteoporosis (brittle  bone disease), think about having a bone density scan (DEXA).    Shots: Get a flu shot each year. Get a tetanus shot every 10 years.    Nutrition:     Eat at least 5 servings of fruits and vegetables each day.    Eat whole-grain bread, whole-wheat pasta and brown rice instead of white grains and rice.    Get adequate Calcium and Vitamin D.     Lifestyle    Exercise at least 150 minutes a week (30 minutes a day, 5 days a week). This will help you control your weight and prevent disease.    Limit alcohol to one drink per day.    No smoking.     Wear sunscreen to prevent skin cancer.     See your dentist every six months for an exam and cleaning.    See your eye doctor every 1 to 2 years.            Follow-ups after your visit        Additional Services     GASTROENTEROLOGY ADULT REF PROCEDURE ONLY [facility] [phone]; No Provider Preference       Last Lab Result: Creatinine (mg/dL)       Date                     Value                 11/14/2018               0.79             ----------  Body mass index is 19.84 kg/(m^2).     Needed:  No  Language:  English    Patient will be contacted to schedule procedure.     Please be aware that coverage of these services is subject to the terms and limitations of your health insurance plan.  Call member services at your health plan with any benefit or coverage questions.  Any procedures must be performed at a [place] facility OR coordinated by your clinic's referral office.    Please bring the following with you to your appointment:    (1) Any X-Rays, CTs or MRIs which have been performed.  Contact the facility where they were done to arrange for  prior to your scheduled appointment.    (2) List of current medications   (3) This referral request   (4) Any documents/labs given to you for this referral            OB/GYN REFERRAL       Your provider has referred you to:  [facility]: [facility] [phone]    [email or web address]     Please be aware that coverage of these services is subject to the terms and limitations of your health insurance plan.  Call member services at your health plan with any benefit or coverage questions.      Please bring the following with you to your appointment:    (1) Any X-Rays, CTs or MRIs which have been performed.  Contact the facility where they were done to arrange for  prior to your scheduled appointment.   (2) List of current medications   (3) This referral request   (4) Any documents/labs given to you for this referral                  Your next 10 appointments already scheduled     Dec 12, 2018  4:00 PM CST   [facility] Daily [facility]   [facility]    [address] [place] [address]   [phone]            Feb 12, 2019 11:00 AM CST   LAB with LAB FIRST FLOOR [facility] ([facility])    [address] [place] [address]   [phone]           Please do not eat 10-12 hours before your appointment if you are coming in fasting for labs on lipids, cholesterol, or glucose (sugar). This does not apply to pregnant women. Water, hot tea and black coffee (with nothing added) are okay. Do not drink other fluids, diet soda or chew gum.            Feb 12, 2019 11:30 AM CST   Return Visit with Gabriela Muir MD   [facility])    [address] [facility] [address]   [phone]            Jun 20, 2019 12:15 PM CDT   Return Visit with Dariana Forde MD   [facility])    [address] [place] [address]   [phone]              Who to contact     If you have questions or need follow up information about today's clinic visit or your schedule please contact [facility] directly at  "[phone].  Normal or non-critical lab and imaging results will be communicated to you by [employer], letter or phone within 4 business days after the clinic has received the results. If you do not hear from us within 7 days, please contact the clinic through [employer] or phone. If you have a critical or abnormal lab result, we will notify you by phone as soon as possible.  Submit refill requests through [employer] or call your pharmacy and they will forward the refill request to us. Please allow 3 business days for your refill to be completed.          Additional Information About Your Visit        [facility] Information     [employer] gives you secure access to your electronic health record. If you see a primary care provider, you can also send messages to your care team and make appointments. If you have questions, please call your primary care clinic.  If you do not have a primary care provider, please call [phone] and they will assist you.      [employer]is is an electronic gateway that provides easy, online access to your medical records. With [employer], you can request a clinic appointment, read your test results, renew a prescription or communicate with your care team.     To access your existing account, please contact your [facility] or call [phone] for assistance.        Care EveryWhere ID     This is your Care EveryWhere ID. This could be used by other organizations to access your [facility] medical records  [id number]        Your Vitals Were     Pulse Temperature Height Pulse Oximetry BMI (Body Mass Index)       68 98.5  F (36.9  C) (Oral) 5' 2\" (1.575 m) 99% 19.84 kg/m2        Blood Pressure from Last 3 Encounters:   11/14/18 100/59   02/06/18 102/67   11/13/17 110/60    Weight from Last 3 Encounters:   11/14/18 108 lb 8 oz (49.2 kg)   02/06/18 116 lb (52.6 kg)   11/13/17 111 lb 11.2 oz (50.7 kg)              We Performed the Following     GASTROENTEROLOGY ADULT REF PROCEDURE " ONLY [facility] [phone]; No Provider Preference     OB/GYN REFERRAL     TDAP VACCINE (ADACEL)        Primary Care Provider Office Phone # Fax #    Ramakrishna Boyer [phone] [address]  [facility] [address]        Equal Access to Services     BLANQUITA WONG : Hadii aad ku hadasho Soomaali, waaxda luqadaha, qaybta kaalmada adeegyada, waxay idiin hayaan adeeg kharash la'aan . So [facility] [phone].    ATENCIÓN: Si habla español, tiene a vo disposición servicios gratuitos de asistencia lingüística. Llame al [phone].    We comply with applicable federal civil rights laws and [place] laws. We do not discriminate on the basis of race, color, national origin, age, disability, sex, sexual orientation, or gender identity.            Thank you!     Thank you for choosing [facility]  for your care. Our goal is always to provide you with excellent care. Hearing back from our patients is one way we can continue to improve our services. Please take a few minutes to complete the written survey that you may receive in the mail after your visit with us. Thank you!             Your Updated Medication List - Protect others around you: Learn how to safely use, store and throw away your medicines at www.disposemymeds.org.          This list is accurate as of 11/14/18  4:26 PM.  Always use your most recent med list.                   Brand Name Dispense Instructions for use Diagnosis    B COMPLEX 1 Tabs      Take  by mouth daily.        buPROPion 100 MG tablet    WELLBUTRIN     Take 100 mg by mouth daily        CITRACAL CALCIUM+D PO      Take 1 tablet by mouth daily        cloNIDine 0.1 MG tablet    CATAPRES     Take 0.1 mg by mouth 2 times daily        EPINEPHrine 0.3 MG/0.3ML injection 2-pack    EPIPEN/ADRENACLICK/or ANY BX GENERIC EQUIV    0.6 mL    INJECT 0.3 MLS (0.3 MG) INTO THE MUSCLE AS NEEDED FOR ANAPHYLAXIS    Bee sting reaction, accidental or unintentional,  sequela       FISH OIL PO      Take  by mouth daily.        fluorouracil 5 % cream    EFUDEX    40 g    Use  Efudex twice daily for 2-3 weeks or until the onset of irritation.    History of actinic keratosis       GLUCOSAMINE CHONDR COMPLEX PO      Reported on 5/16/2017        hydroquinone 4 % Crea     30 g    Apply a thin layer to the face for up to 4 weeks in a row, take 8 weeks off then repeat as needed    Photoaging of skin       lamoTRIgine 100 MG tablet    LaMICtal     Take 200 mg by mouth daily        levonorgestrel 20 MCG/24HR IUD    MIRENA     1 each by Intrauterine route once        levothyroxine 75 MCG tablet    SYNTHROID/LEVOTHROID    90 tablet    Take 1 tablet (75 mcg) by mouth daily    Postsurgical hypothyroidism       MAGNESIUM OXIDE PO      Take 1 tablet by mouth daily        Multiple vitamin Caps      Take  by mouth daily.        * tazarotene 0.05 % topical gel    TAZORAC    30 g    Apply a pea sized amount to the face at bedtime.    Acne vulgaris       * tazarotene 0.1 % Crea     30 g    Apply a pea-sized amount nightly to face. Keep away from eyes.    Acne vulgaris       tiZANidine 4 MG tablet    ZANAFLEX    30 tablet    TAKE 1 TABLET (4 MG) BY MOUTH NIGHTLY AS NEEDED FOR MUSCLE SPASMS    DDD (degenerative disc disease), lumbar, Back muscle spasm       vitamin E 400 UNIT capsule      Take by mouth daily Reported on 5/16/2017        * Notice:  This list has 2 medication(s) that are the same as other medications prescribed for you. Read the directions carefully, and ask your doctor or other care provider to review them with you.

## 2018-11-14 NOTE — PATIENT INSTRUCTIONS
Get the TDAP shot today  Get the flu shot today    Schedule for colonoscopy with Dr. Toure  Schedule for Gyn consult for IUD removal/pap       Preventive Health Recommendations  Female Ages 50 - 64    Yearly exam: See your health care provider every year in order to  o Review health changes.   o Discuss preventive care.    o Review your medicines if your doctor has prescribed any.      Get a Pap test every three years (unless you have an abnormal result and your provider advises testing more often).    If you get Pap tests with HPV test, you only need to test every 5 years, unless you have an abnormal result.     You do not need a Pap test if your uterus was removed (hysterectomy) and you have not had cancer.    You should be tested each year for STDs (sexually transmitted diseases) if you're at risk.     Have a mammogram every 1 to 2 years.    Have a colonoscopy at age 50, or have a yearly FIT test (stool test). These exams screen for colon cancer.      Have a cholesterol test every 5 years, or more often if advised.    Have a diabetes test (fasting glucose) every three years. If you are at risk for diabetes, you should have this test more often.     If you are at risk for osteoporosis (brittle bone disease), think about having a bone density scan (DEXA).    Shots: Get a flu shot each year. Get a tetanus shot every 10 years.    Nutrition:     Eat at least 5 servings of fruits and vegetables each day.    Eat whole-grain bread, whole-wheat pasta and brown rice instead of white grains and rice.    Get adequate Calcium and Vitamin D.     Lifestyle    Exercise at least 150 minutes a week (30 minutes a day, 5 days a week). This will help you control your weight and prevent disease.    Limit alcohol to one drink per day.    No smoking.     Wear sunscreen to prevent skin cancer.     See your dentist every six months for an exam and cleaning.    See your eye doctor every 1 to 2 years.

## 2018-11-21 ENCOUNTER — TRANSFERRED RECORDS (OUTPATIENT)
Dept: HEALTH INFORMATION MANAGEMENT | Facility: CLINIC | Age: 53
End: 2018-11-21

## 2018-11-28 ENCOUNTER — OFFICE VISIT (OUTPATIENT)
Dept: OBGYN | Facility: CLINIC | Age: 53
End: 2018-11-28
Attending: FAMILY MEDICINE
Payer: COMMERCIAL

## 2018-11-28 VITALS
OXYGEN SATURATION: 96 % | BODY MASS INDEX: 19.84 KG/M2 | SYSTOLIC BLOOD PRESSURE: 84 MMHG | HEART RATE: 60 BPM | WEIGHT: 108.5 LBS | DIASTOLIC BLOOD PRESSURE: 52 MMHG

## 2018-11-28 DIAGNOSIS — R87.810 CERVICAL HIGH RISK HPV (HUMAN PAPILLOMAVIRUS) TEST POSITIVE: Primary | ICD-10-CM

## 2018-11-28 DIAGNOSIS — Z30.432 ENCOUNTER FOR IUD REMOVAL: ICD-10-CM

## 2018-11-28 PROCEDURE — 88175 CYTOPATH C/V AUTO FLUID REDO: CPT | Performed by: OBSTETRICS & GYNECOLOGY

## 2018-11-28 PROCEDURE — 87624 HPV HI-RISK TYP POOLED RSLT: CPT | Performed by: OBSTETRICS & GYNECOLOGY

## 2018-11-28 PROCEDURE — 58301 REMOVE INTRAUTERINE DEVICE: CPT | Performed by: OBSTETRICS & GYNECOLOGY

## 2018-11-28 RX ORDER — LAMOTRIGINE 200 MG/1
TABLET ORAL
COMMUNITY
Start: 2018-11-24 | End: 2019-04-01

## 2018-11-28 NOTE — MR AVS SNAPSHOT
After Visit Summary   11/28/2018    Cynthia Marie Barthel    MRN: [id number]           Patient Information     Date Of Birth          1965        Visit Information        Provider Department      11/28/2018 4:30 PM Tanika Orozco DO [facility]        Today's Diagnoses     Cervical high risk HPV (human papillomavirus) test positive    -  1      Care Instructions                                                         If you have any questions regarding your visit, Please contact your care team.    [facility]: [phone]      Women s Health CLINIC HOURS TELEPHONE NUMBER   Tanika Orozco DO.    SOPHIE Lopez -    JUDY Hinojosa       Monday, Wednesday, Thursday and Friday, [place]  8:30a.m-5:00 p.m   [facility]  [address] [place] [address]  [phone] ask for [facility]    Imaging [phone]       Urgent Care locations:    [facility] Saturday and Sunday   9 am - 5 pm    Monday-Friday   12 pm - 8 pm  Saturday and Sunday   9 am - 5 pm   [phone]     [facility] Labor and Delivery:  [phone]    If you need a medication refill, please contact your pharmacy. Please allow 3 business days for your refill to be completed.  As always, Thank you for trusting us with your healthcare needs!                Follow-ups after your visit        Your next 10 appointments already scheduled     Dec 12, 2018  4:00 PM CST   New Visit with Hi Daily OD   [facility])    [address] [facility] [id number] [phone]            Feb 12, 2019 11:00 AM CST   LAB with LAB FIRST FLOOR [facility])    [address] [facility] [id number] [phone]           Please do not eat 10-12 hours before your appointment if you are  coming in fasting for labs on lipids, cholesterol, or glucose (sugar). This does not apply to pregnant women. Water, hot tea and black coffee (with nothing added) are okay. Do not drink other fluids, diet soda or chew gum.            Feb 12, 2019 11:30 AM CST   Return Visit with Gabriela Muir MD   [facility]    [address] [place] [address] [phone]            Jun 20, 2019 12:15 PM CDT   Return Visit with Dariana Forde MD   [facility])    [address] [place] [address] [phone]              Who to contact     If you have questions or need follow up information about today's clinic visit or your schedule please contact [facility] directly at [phone].  Normal or non-critical lab and imaging results will be communicated to you by [employer], letter or phone within 4 business days after the clinic has received the results. If you do not hear from us within 7 days, please contact the clinic through [employer] or phone. If you have a critical or abnormal lab result, we will notify you by phone as soon as possible.  Submit refill requests through [employer] or call your pharmacy and they will forward the refill request to us. Please allow 3 business days for your refill to be completed.          Additional Information About Your Visit        [employer] Information     [employer] gives you secure access to your electronic health record. If you see a primary care provider, you can also send messages to your care team and make appointments. If you have questions, please call your primary care clinic.  If you do not have a primary care provider, please call [phone] and they will assist you.        Care EveryWhere ID     This is your Care EveryWhere ID. This could be used by other organizations to access your [facility] medical records  [id number]        Your Vitals  Were     Pulse Pulse Oximetry Breastfeeding? BMI (Body Mass Index)          60 96% No 19.84 kg/m2         Blood Pressure from Last 3 Encounters:   11/28/18 (!) 84/52   11/14/18 100/59   02/06/18 102/67    Weight from Last 3 Encounters:   11/28/18 108 lb 8 oz (49.2 kg)   11/14/18 108 lb 8 oz (49.2 kg)   02/06/18 116 lb (52.6 kg)              We Performed the Following     HPV High Risk Types DNA Cervical     Pap imaged thin layer diagnostic with HPV (select HPV order below)          Today's Medication Changes          These changes are accurate as of 11/28/18  4:39 PM.  If you have any questions, ask your nurse or doctor.               These medicines have changed or have updated prescriptions.        Dose/Directions    lamoTRIgine 200 MG tablet   Commonly known as:  LaMICtal   This may have changed:  Another medication with the same name was removed. Continue taking this medication, and follow the directions you see here.   Changed by:  Tanika Orozco DO        Refills:  0       tazarotene 0.1 % external cream   Commonly known as:  TAZORAC   This may have changed:  Another medication with the same name was removed. Continue taking this medication, and follow the directions you see here.   Used for:  Acne vulgaris   Changed by:  Tanika Orozco, DO        Apply a pea-sized amount nightly to face. Keep away from eyes.   Quantity:  30 g   Refills:  3       tiZANidine 4 MG tablet   Commonly known as:  ZANAFLEX   This may have changed:  Another medication with the same name was removed. Continue taking this medication, and follow the directions you see here.   Used for:  DDD (degenerative disc disease), lumbar, Back muscle spasm   Changed by:  Tanika Orozco, DO        TAKE 1 TABLET (4 MG) BY MOUTH NIGHTLY AS NEEDED FOR MUSCLE SPASMS   Quantity:  30 tablet   Refills:  3                Primary Care Provider Office Phone # Fax #    Ramakrishna Boyer [phone] [address]  [facility] [address]        Equal Access to Services     [facility] [place] MARVIN : Hadii heaven butler reneakassidy Emilee, wafamda luqadaha, qaybta kanayapolly oliver, anny boydharrietbonifacio cavazos. So [facility] [place] [phone].    ATENCIÓN: Si habla español, tiene a vo disposición servicios gratuitos de asistencia lingüística. Sandra al [phone].    We comply with applicable federal civil rights laws and [place] laws. We do not discriminate on the basis of race, color, national origin, age, disability, sex, sexual orientation, or gender identity.            Thank you!     Thank you for choosing [facility]  for your care. Our goal is always to provide you with excellent care. Hearing back from our patients is one way we can continue to improve our services. Please take a few minutes to complete the written survey that you may receive in the mail after your visit with us. Thank you!             Your Updated Medication List - Protect others around you: Learn how to safely use, store and throw away your medicines at www.disposemymeds.org.          This list is accurate as of 11/28/18  4:39 PM.  Always use your most recent med list.                   Brand Name Dispense Instructions for use Diagnosis    B COMPLEX 1 Tabs      Take  by mouth daily.        buPROPion 100 MG tablet    WELLBUTRIN     Take 100 mg by mouth daily        CITRACAL CALCIUM+D PO      Take 1 tablet by mouth daily        cloNIDine 0.1 MG tablet    CATAPRES     Take 0.1 mg by mouth 2 times daily        EPINEPHrine 0.3 MG/0.3ML injection 2-pack    EPIPEN/ADRENACLICK/or ANY BX GENERIC EQUIV    0.6 mL    INJECT 0.3 MLS (0.3 MG) INTO THE MUSCLE AS NEEDED FOR ANAPHYLAXIS    Bee sting reaction, accidental or unintentional, sequela       FISH OIL PO      Take  by mouth daily.        fluorouracil 5 % external cream    EFUDEX    40 g    Use  Efudex twice daily for 2-3 weeks or until the onset of irritation.    History of actinic keratosis        GLUCOSAMINE CHONDR COMPLEX PO      Reported on 5/16/2017        hydroquinone 4 % external cream    ROSARIO    30 g    Apply a thin layer to the face for up to 4 weeks in a row, take 8 weeks off then repeat as needed    Photoaging of skin       lamoTRIgine 200 MG tablet    LaMICtal          levonorgestrel 20 MCG/24HR IUD    MIRENA     1 each by Intrauterine route once        levothyroxine 75 MCG tablet    SYNTHROID/LEVOTHROID    90 tablet    Take 1 tablet (75 mcg) by mouth daily    Postsurgical hypothyroidism       MAGNESIUM OXIDE PO      Take 1 tablet by mouth daily        Multiple vitamin Caps      Take  by mouth daily.        tazarotene 0.1 % external cream    TAZORAC    30 g    Apply a pea-sized amount nightly to face. Keep away from eyes.    Acne vulgaris       tiZANidine 4 MG tablet    ZANAFLEX    30 tablet    TAKE 1 TABLET (4 MG) BY MOUTH NIGHTLY AS NEEDED FOR MUSCLE SPASMS    DDD (degenerative disc disease), lumbar, Back muscle spasm       vitamin E 400 units (180 mg) capsule    TOCOPHEROL     Take by mouth daily Reported on 5/16/2017

## 2018-11-28 NOTE — LETTER
[address] [address]  Phone: [phone]    12/11/18    Cynthia Marie Barthel  [address] [place] [address]      Dear Johanne-      We have been unable to reach you by telephone regarding scheduling the Colposcopy.  Please contact our office at [phone] to schedule.    Sincerely,      Tanika Orozco, DO

## 2018-11-28 NOTE — PROGRESS NOTES
"This 54 y/o female, , presents as a referral from Dr. Boyer and is a new patient to the [facility] gyn dept.  She had her current Mirena IUD placed 5 years ago but does not recall the exact date.  She is due for removal but it appeared lodged in her cervical canal at her last visit so she was sent to gyn for removal plus a pap smear.  The patient has a history of \"other\" high-risk HPV per DNA marker test on 17 so a diagnostic pap was advised for follow up today.  Informed consent was reviewed and obtained for IUD removal.  BP (!) 84/52  Pulse 60  Wt 108 lb 8 oz (49.2 kg)  SpO2 96%  Breastfeeding? No  BMI 19.84 kg/m2  ROS:  10 systems were reviewed and the positives were listed under problems.  A bi-valve speculum was placed and the 2 IUD strings were visualized and were the correct length.  The diagnostic pap smear was obtained and submitted.  Next, the 2 Mirena strings were grasped near the cervix with a Ring forceps and her IUD was removed without difficulty.  She tolerated the procedure well and f/u instructions were reviewed.  Assessment - Pap smear, IUD removal  Plan - A diagnostic pap was obtained and submitted due to her history of \"other\" high-risk HPV subtypes last year per her DNA marker test.  We discussed measures to bolster her immune system and she does not smoke nor is she exposed to secondhand smoke.  She declined a contraceptive consult since she has not had a menses in \"years.\"  She will return if she experiences any postmenopausal bleeding issues.  This was a 30-minute visit and over 50% of the time was spent in direct pt consultation and 5 minutes was spent in procedure.  "

## 2018-11-28 NOTE — PATIENT INSTRUCTIONS
If you have any questions regarding your visit, Please contact your care team.    [facility]: [phone]      [facility] Health CLINIC HOURS TELEPHONE NUMBER   Tanika Orozco DO.    SOPHIE Lopez -    JUDY Hinojosa       Monday, Wednesday, Thursday and Friday, [place]  8:30a.m-5:00 p.m   [facility]  [address] [place] [address]  [phone] ask for [facility]    Imaging [phone]       Urgent Care locations:    [facility] Saturday and Sunday   9 am - 5 pm    Monday-Friday   12 pm - 8 pm  Saturday and Sunday   9 am - 5 pm   [phone]     [facility] Labor and Delivery:  [phone]    If you need a medication refill, please contact your pharmacy. Please allow 3 business days for your refill to be completed.  As always, Thank you for trusting us with your healthcare needs!

## 2018-12-04 LAB
COPATH REPORT: NORMAL
PAP: NORMAL

## 2018-12-05 LAB
FINAL DIAGNOSIS: ABNORMAL
HPV HR 12 DNA CVX QL NAA+PROBE: POSITIVE
HPV16 DNA SPEC QL NAA+PROBE: NEGATIVE
HPV18 DNA SPEC QL NAA+PROBE: NEGATIVE
SPECIMEN DESCRIPTION: ABNORMAL
SPECIMEN SOURCE CVX/VAG CYTO: ABNORMAL

## 2018-12-12 ENCOUNTER — OFFICE VISIT (OUTPATIENT)
Dept: OPTOMETRY | Facility: CLINIC | Age: 53
End: 2018-12-12
Payer: COMMERCIAL

## 2018-12-12 DIAGNOSIS — H52.222 REGULAR ASTIGMATISM OF LEFT EYE: ICD-10-CM

## 2018-12-12 DIAGNOSIS — H52.13 MYOPIA OF BOTH EYES: Primary | ICD-10-CM

## 2018-12-12 DIAGNOSIS — H52.4 PRESBYOPIA: ICD-10-CM

## 2018-12-12 PROCEDURE — 92014 COMPRE OPH EXAM EST PT 1/>: CPT | Performed by: OPTOMETRIST

## 2018-12-12 ASSESSMENT — REFRACTION_WEARINGRX
OS_ADD: +2.00
OD_AXIS: 005
OD_CYLINDER: +0.50
OD_ADD: +2.00
OS_SPHERE: -4.25
OD_SPHERE: -5.00
OS_CYLINDER: SPHERE

## 2018-12-12 ASSESSMENT — SLIT LAMP EXAM - LIDS
COMMENTS: NORMAL
COMMENTS: NORMAL

## 2018-12-12 ASSESSMENT — VISUAL ACUITY
OD_CC: 20/25
OS_SC: 20/200
OS_CC: 20/100
OS_CC: 20/20
OD_CC: 20/100
METHOD: SNELLEN - LINEAR
OD_SC: CF @ 3'
CORRECTION_TYPE: CONTACTS

## 2018-12-12 ASSESSMENT — REFRACTION_MANIFEST
OS_ADD: +2.00
OD_CYLINDER: +0.50
OD_ADD: +2.00
OD_AXIS: 005
OD_SPHERE: -5.00
OS_SPHERE: -4.25

## 2018-12-12 ASSESSMENT — REFRACTION_CURRENTRX
OS_BASECURVE: 8.40
OD_BASECURVE: 8.40
OS_DIAMETER: 14.0
OD_DIAMETER: 14.0
OD_BRAND: J&J 1-DAY ACUVUE MOIST BC 8.5, D 14.2
OS_SPHERE: -4.00
OD_SPHERE: -3.00
OD_SPHERE: -3.00
OD_BRAND: J&J ACUVUE OASYS
OS_BRAND: J&J ACUVUE OASYS
OS_SPHERE: -4.00
OS_BRAND: J&J 1-DAY ACUVUE MOIST BC 8.5, D 14.2

## 2018-12-12 ASSESSMENT — TONOMETRY
IOP_METHOD: BOTH EYES NORMAL BY PALPATION
OD_IOP_MMHG: SOFT
OS_IOP_MMHG: SOFT

## 2018-12-12 ASSESSMENT — EXTERNAL EXAM - RIGHT EYE: OD_EXAM: NORMAL

## 2018-12-12 ASSESSMENT — CONF VISUAL FIELD
OD_NORMAL: 1
OS_NORMAL: 1
METHOD: COUNTING FINGERS

## 2018-12-12 ASSESSMENT — CUP TO DISC RATIO
OD_RATIO: 0.25
OS_RATIO: 0.2

## 2018-12-12 ASSESSMENT — EXTERNAL EXAM - LEFT EYE: OS_EXAM: NORMAL

## 2018-12-12 NOTE — PROGRESS NOTES
"Chief Complaint   Patient presents with     Annual Eye Exam        Previous contact lens wearer? Yes:   Comfort of contact lenses :Likes contacts does not want a CL fit this year \"does not want to pay for a piece of paper\"  Satisfied with current lenses: Yes    Wants new rx for glasses- wants single vision she can read but still walk around in the house with-    Last Eye Exam: 2017  Dilated Previously: Does not have time for dilation    What are you currently using to see?  glasses and contacts    Distance Vision Acuity: Satisfied with vision    Near Vision Acuity: Satisfied with vision while reading  with readers over the contacts if eyes are tired    Eye Comfort: good  Do you use eye drops? : No  Occupation or Hobbies:       Krystal Amparo  Optometric Tech       Medical, surgical and family histories reviewed and updated 12/12/2018.       OBJECTIVE: See Ophthalmology exam    ASSESSMENT:    ICD-10-CM    1. Myopia of both eyes H52.13    2. Regular astigmatism of left eye H52.222    3. Presbyopia H52.4       PLAN:     Patient Instructions   Eyeglass prescription given.    Recommend returning for dilated fundus exam. It is a more thorough exam of the retina.    Return in 1 year for a complete eye exam or sooner if needed.    Hi Daily, OD                       "

## 2018-12-12 NOTE — PATIENT INSTRUCTIONS
Eyeglass prescription given.    Recommend returning for dilated fundus exam. It is a more thorough exam of the retina.    Return in 1 year for a complete eye exam or sooner if needed.    Hi Daily, ANN MARIE      The affects of the dilating drops last for 4- 6 hours.  You will be more sensitive to light and vision will be blurry up close.  Mydriatic sunglasses were given if needed.      Optometry Providers       Clinic Locations                                 Telephone Number   Dr. Patricia Winter [facility] [place]   [place] [phone]     [facility] Hours:                [place] [facility] Hours:       [place] [facility] Hours:   [address]   [address]      [address] [place] [address]   [place] [address]    [place] [address]  Phone: [phone]                    Phone: [phone]     Phone: [phone]                      Monday 8:00-7:00                          Monday 8:00-7:00                          Monday 8:00-7:00              Tuesday 8:00-6:00                          Tuesday 8:00-7:00                          Tuesday 8:00-7:00              Wednesday 8:00-6:00                  Wednesday 8:00-7:00                   Wednesday 8:00-7:00      Thursday 8:00-6:00                        Thursday 8:00-7:00                         Thursday 8:00-7:00            Friday 8:00-5:00                              Friday 8:00-5:00                              Friday 8:00-5:00    [place] [facility] Hours:   [address] [place] [address]  [phone]    Monday 8:00-7:00  Tuesday 8:00-7:00  Wednesday 8:00-7:00  Thursday 8:00-7:00  Friday 8:00-5:00  Please log on to [email or web address] to order your contact lenses.  The link is found on the Eye Care and Vision Services page.  As always, Thank you for trusting us with your health care needs!

## 2019-01-04 ENCOUNTER — TELEPHONE (OUTPATIENT)
Dept: PEDIATRICS | Facility: CLINIC | Age: 54
End: 2019-01-04

## 2019-01-04 NOTE — TELEPHONE ENCOUNTER
----- Message from Jeeran sent at 1/4/2019 10:49 AM CST -----      Topic: Procedural Question      I have an MRI for my TMJ from a different provider and Dr Boyer said I could upload it to [facility] and then she could see it.  I can't find a way to upload it.  Please reply via email or call me at [phone].  Thanks.

## 2019-01-22 DIAGNOSIS — E89.0 POSTSURGICAL HYPOTHYROIDISM: ICD-10-CM

## 2019-01-22 RX ORDER — LEVOTHYROXINE SODIUM 75 UG/1
75 TABLET ORAL DAILY
Qty: 90 TABLET | Refills: 0 | Status: SHIPPED | OUTPATIENT
Start: 2019-01-22 | End: 2019-04-10

## 2019-02-04 ENCOUNTER — HOSPITAL ENCOUNTER (OUTPATIENT)
Facility: AMBULATORY SURGERY CENTER | Age: 54
End: 2019-02-04
Attending: SURGERY | Admitting: SURGERY
Payer: COMMERCIAL

## 2019-02-25 ENCOUNTER — OFFICE VISIT (OUTPATIENT)
Dept: OBGYN | Facility: CLINIC | Age: 54
End: 2019-02-25
Payer: COMMERCIAL

## 2019-02-25 VITALS
DIASTOLIC BLOOD PRESSURE: 58 MMHG | SYSTOLIC BLOOD PRESSURE: 92 MMHG | WEIGHT: 110.2 LBS | HEART RATE: 66 BPM | OXYGEN SATURATION: 96 % | BODY MASS INDEX: 20.16 KG/M2

## 2019-02-25 DIAGNOSIS — R87.810 CERVICAL HIGH RISK HPV (HUMAN PAPILLOMAVIRUS) TEST POSITIVE: Primary | ICD-10-CM

## 2019-02-25 PROCEDURE — 99213 OFFICE O/P EST LOW 20 MIN: CPT | Mod: 25 | Performed by: OBSTETRICS & GYNECOLOGY

## 2019-02-25 PROCEDURE — 57456 ENDOCERV CURETTAGE W/SCOPE: CPT | Performed by: OBSTETRICS & GYNECOLOGY

## 2019-02-25 PROCEDURE — 88305 TISSUE EXAM BY PATHOLOGIST: CPT | Performed by: OBSTETRICS & GYNECOLOGY

## 2019-02-25 NOTE — PROGRESS NOTES
"This 52 y/o female, , postmenopausal state, presents for colposcopy but is quite upset that the  told her that today's appt was at 4:30 pm rather than 4:00 pm.  However, we were able to work her into the schedule at 4:05 pm because the 4:00 pm patient was late and was seen after this patient.  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  Therefore, colposcopy with biopsies was advised and informed consent was reviewed and obtained.  Her pap and DNA reflex test results for high-risk HPV subtypes were reviewed with the patient and all of her questions and concerns were addressed.  She requested to wear ear plugs and play music on her cell phone during the procedure since she was quite nervous and did not want me to talk while performing this.  She declined a UPT since she has not had any pregnancy exposure nor menses in years.  BP 92/58   Pulse 66   Wt 50 kg (110 lb 3.2 oz)   SpO2 96%   Breastfeeding? No   BMI 20.16 kg/m    ROS:  10 systems were reviewed and the positives were listed under problems.  A bi-valve speculum was placed and her cervix was soaked with 3% acetic acid.  After several minutes, this fluid was removed and colposcopy was performed.  There were no areas of acetowhite affecting the ectocervix so no biopsies were taken except for the endocervical curettings.  The patient c/o pain cramping during this part of the procedure but was able to tolerate it.  All instruments were then removed and this was a satisfactory colposcopy.  EBL was 1 ml and there were no complications.  Counts were correct.  F/u care and instructions were reviewed with the patient and all of her questions and concerns were addressed.  This was a 30-minute visit and over 50% of the time was spent in direct patient consultation and 10 minutes were spent in procedure.  "

## 2019-02-25 NOTE — PATIENT INSTRUCTIONS
If you have any questions regarding your visit, Please contact your care team.    [facility]: [phone]      [facility] CLINIC HOURS TELEPHONE NUMBER   Tanika Orozco DO.    SOPHIE Lopez -    JUDY Hinojosa       Monday, Wednesday, Thursday and Friday, [place]  8:30a.m-5:00 p.m   [facility]  [address] [place] [address]  [phone] ask for [facility]    Imaging [phone]       Urgent Care locations:    [facility] Saturday and Sunday   9 am - 5 pm    Monday-Friday   12 pm - 8 pm  Saturday and Sunday   9 am - 5 pm   [phone]     [facility] Labor and Delivery:  [phone]    If you need a medication refill, please contact your pharmacy. Please allow 3 business days for your refill to be completed.  As always, Thank you for trusting us with your healthcare needs!

## 2019-02-27 LAB — COPATH REPORT: NORMAL

## 2019-03-07 ENCOUNTER — ANCILLARY PROCEDURE (OUTPATIENT)
Dept: MAMMOGRAPHY | Facility: CLINIC | Age: 54
End: 2019-03-07
Attending: FAMILY MEDICINE
Payer: COMMERCIAL

## 2019-03-07 DIAGNOSIS — Z12.31 VISIT FOR SCREENING MAMMOGRAM: ICD-10-CM

## 2019-03-07 PROCEDURE — 77067 SCR MAMMO BI INCL CAD: CPT

## 2019-04-01 RX ORDER — LAMOTRIGINE 100 MG/1
200 TABLET ORAL DAILY
COMMUNITY
End: 2019-09-04

## 2019-04-01 ASSESSMENT — MIFFLIN-ST. JEOR: SCORE: 1049.48

## 2019-04-05 ENCOUNTER — HOSPITAL ENCOUNTER (OUTPATIENT)
Facility: AMBULATORY SURGERY CENTER | Age: 54
Discharge: HOME | End: 2019-04-05
Attending: FAMILY MEDICINE | Admitting: FAMILY MEDICINE
Payer: COMMERCIAL

## 2019-04-05 VITALS
WEIGHT: 107 LBS | TEMPERATURE: 98.1 F | RESPIRATION RATE: 16 BRPM | DIASTOLIC BLOOD PRESSURE: 75 MMHG | OXYGEN SATURATION: 99 % | HEIGHT: 63 IN | BODY MASS INDEX: 18.96 KG/M2 | SYSTOLIC BLOOD PRESSURE: 118 MMHG

## 2019-04-05 LAB — COLONOSCOPY: NORMAL

## 2019-04-05 PROCEDURE — 99153 MOD SED SAME PHYS/QHP EA: CPT | Performed by: FAMILY MEDICINE

## 2019-04-05 PROCEDURE — 45385 COLONOSCOPY W/LESION REMOVAL: CPT

## 2019-04-05 PROCEDURE — 45385 COLONOSCOPY W/LESION REMOVAL: CPT | Performed by: FAMILY MEDICINE

## 2019-04-05 PROCEDURE — 88305 TISSUE EXAM BY PATHOLOGIST: CPT | Performed by: FAMILY MEDICINE

## 2019-04-05 PROCEDURE — G8918 PT W/O PREOP ORDER IV AB PRO: HCPCS

## 2019-04-05 PROCEDURE — 99152 MOD SED SAME PHYS/QHP 5/>YRS: CPT | Mod: 59 | Performed by: FAMILY MEDICINE

## 2019-04-05 PROCEDURE — 45380 COLONOSCOPY AND BIOPSY: CPT

## 2019-04-05 PROCEDURE — G8907 PT DOC NO EVENTS ON DISCHARG: HCPCS

## 2019-04-05 RX ORDER — DIPHENHYDRAMINE HYDROCHLORIDE 50 MG/ML
INJECTION INTRAMUSCULAR; INTRAVENOUS PRN
Status: DISCONTINUED | OUTPATIENT
Start: 2019-04-05 | End: 2019-04-05 | Stop reason: HOSPADM

## 2019-04-05 RX ORDER — FENTANYL CITRATE 50 UG/ML
INJECTION, SOLUTION INTRAMUSCULAR; INTRAVENOUS PRN
Status: DISCONTINUED | OUTPATIENT
Start: 2019-04-05 | End: 2019-04-05 | Stop reason: HOSPADM

## 2019-04-05 RX ORDER — ONDANSETRON 2 MG/ML
4 INJECTION INTRAMUSCULAR; INTRAVENOUS
Status: DISCONTINUED | OUTPATIENT
Start: 2019-04-05 | End: 2019-04-06 | Stop reason: HOSPADM

## 2019-04-05 RX ORDER — LIDOCAINE 40 MG/G
CREAM TOPICAL
Status: DISCONTINUED | OUTPATIENT
Start: 2019-04-05 | End: 2019-04-06 | Stop reason: HOSPADM

## 2019-04-10 ENCOUNTER — OFFICE VISIT (OUTPATIENT)
Dept: ENDOCRINOLOGY | Facility: CLINIC | Age: 54
End: 2019-04-10
Payer: COMMERCIAL

## 2019-04-10 VITALS
HEART RATE: 87 BPM | HEIGHT: 62 IN | BODY MASS INDEX: 20.69 KG/M2 | WEIGHT: 112.43 LBS | SYSTOLIC BLOOD PRESSURE: 100 MMHG | OXYGEN SATURATION: 97 % | DIASTOLIC BLOOD PRESSURE: 64 MMHG

## 2019-04-10 DIAGNOSIS — E89.0 POSTSURGICAL HYPOTHYROIDISM: ICD-10-CM

## 2019-04-10 LAB
COPATH REPORT: NORMAL
T4 FREE SERPL-MCNC: 1.04 NG/DL (ref 0.76–1.46)
TSH SERPL DL<=0.005 MIU/L-ACNC: 1.12 MU/L (ref 0.4–4)

## 2019-04-10 PROCEDURE — 84443 ASSAY THYROID STIM HORMONE: CPT | Performed by: INTERNAL MEDICINE

## 2019-04-10 PROCEDURE — 99213 OFFICE O/P EST LOW 20 MIN: CPT | Performed by: INTERNAL MEDICINE

## 2019-04-10 PROCEDURE — 84439 ASSAY OF FREE THYROXINE: CPT | Performed by: INTERNAL MEDICINE

## 2019-04-10 PROCEDURE — 36415 COLL VENOUS BLD VENIPUNCTURE: CPT | Performed by: INTERNAL MEDICINE

## 2019-04-10 RX ORDER — LEVOTHYROXINE SODIUM 75 UG/1
75 TABLET ORAL DAILY
Qty: 90 TABLET | Refills: 3 | Status: SHIPPED | OUTPATIENT
Start: 2019-04-10 | End: 2020-05-27

## 2019-04-10 ASSESSMENT — MIFFLIN-ST. JEOR: SCORE: 1058.38

## 2019-04-10 NOTE — PROGRESS NOTES
The patient is seen in f/up for thyroid nodules and hypothyroidism.    Today she feels good and she denies any specific complaints with the exception of hot flashes, which are persistent and may be more severe since she has not been exercising much.  She has been having hot flashes since 2017.  Mirena was discontinued in February this year and her menstrual periods did not resume.  The hot flashes got worse following the discontinuation of clonidine so she restarted it at a smaller dose.  Current levothyroxine replacement dose is 75  g daily.   She continues to deal with severe TMJ issues and insomnia.    Johanne first discovered a lump on the left side of the neck in the year 2000. Thyroid ultrasound revealed a dominant left thyroid nodule and a few small thyroid nodules present on the right thyroid lobe. The result of the fine needle aspiration of the left nodule was inconclusive. The patient underwent left hemithyroidectomy in November 2000.  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 There was no evidence of malignancy. A single parathyroid gland was removed. After surgery, she was started on levothyroxine. Subsequently, she had f/up thyroid ultrasounds done for a small right nodule in October 2012, December 2012, October 2013.     Past Medical History   Scoliosis - at birth   Fusion of the spine surgery   3 sinus surgeries   Allergies   Thyroid nodules   L hemithyroidectomy   Hypothyroidism post thyroidectomy   Mild sleep apnea - dx 2015 not using the CPAP machine   IUD for 8 years   Depression since 2 yo, treated since 2001    Current Medications    Current Outpatient Medications:      B Complex Vitamins (B COMPLEX 1) TABS, Take  by mouth daily., Disp: , Rfl:      Calcium-Magnesium-Vitamin D (CITRACAL CALCIUM+D PO), Take 1 tablet by mouth daily, Disp: , Rfl:      cloNIDine (CATAPRES) 0.1 MG tablet, Take 0.1 mg by mouth 2 times daily, Disp: , Rfl:       cyclobenzaprine (FLEXERIL) 5 MG tablet, Take 1 tablet (5 mg) by mouth nightly as needed for muscle spasms, Disp: 30 tablet, Rfl: 3     fluorouracil (EFUDEX) 5 % cream, Use  Efudex twice daily for 2-3 weeks or until the onset of irritation., Disp: 40 g, Rfl: 0     lamoTRIgine (LAMICTAL) 100 MG tablet, Take 200 mg by mouth daily, Disp: , Rfl:      levothyroxine (SYNTHROID/LEVOTHROID) 75 MCG tablet, Take 1 tablet (75 mcg) by mouth daily, Disp: 90 tablet, Rfl: 0     MAGNESIUM OXIDE PO, Take 1 tablet by mouth daily, Disp: , Rfl:      Multiple Vitamin CAPS, Take  by mouth daily., Disp: , Rfl:      tazarotene 0.1 % CREA, Apply a pea-sized amount nightly to face. Keep away from eyes., Disp: 30 g, Rfl: 3     EPINEPHrine (EPIPEN/ADRENACLICK/OR ANY BX GENERIC EQUIV) 0.3 MG/0.3ML injection 2-pack, INJECT 0.3 MLS (0.3 MG) INTO THE MUSCLE AS NEEDED FOR ANAPHYLAXIS (Patient not taking: Reported on 2/25/2019), Disp: 0.6 mL, Rfl: 1     Glucosamine-Chondroitin (GLUCOSAMINE CHONDR COMPLEX PO), Reported on 5/16/2017, Disp: , Rfl:      hydroquinone 4 % CREA, Apply a thin layer to the face for up to 4 weeks in a row, take 8 weeks off then repeat as needed (Patient not taking: Reported on 2/25/2019), Disp: 30 g, Rfl: 1     Omega-3 Fatty Acids (FISH OIL PO), Take  by mouth daily., Disp: , Rfl:      vitamin E 400 UNIT capsule, Take by mouth daily Reported on 5/16/2017, Disp: , Rfl:     Family History  Father, grandfather, uncles had depression. No thyroid. No diabetes. Great uncles and aunts had colon cancer.  Maternal aunts - osteoporosis.     Social History  . No children. Quit smoking in 2016. Quit drinking alcohol.. Occupation: . Takes MVI, B complex, Mg.     Review of Systems   Systemic:              Weight stable, no sign fatigue   Eye:                      No eye symptoms   Krystle-Laryngeal:     No krystle-laryngeal symptoms, no dysphagia, no hoarseness, no cough     Breast:                  No breast  "symptoms  Cardiovascular:    No cardiovascular symptoms, no CP or palpitations   Pulmonary:           No pulmonary symptoms, no SOB or cough    Gastrointestinal:   No diarrhea or constipation   Genitourinary:       no increased thirst, no increased urination   Endocrine:            As above  Neurological:       Rare headaches in am, no tremor, numbness in her back longstaning, no dizziness   Musculoskeletal:  joint pain - hips, knees, neck - with morning stiffness    Skin:                     Mild dry skin, no hair falling out   Psychological:      Depression is controlled.     Vital Signs     Previous Weights:   Wt Readings from Last 10 Encounters:   04/10/19 51 kg (112 lb 7 oz)   04/01/19 48.5 kg (107 lb)   02/25/19 50 kg (110 lb 3.2 oz)   11/28/18 49.2 kg (108 lb 8 oz)   11/14/18 49.2 kg (108 lb 8 oz)   02/06/18 52.6 kg (116 lb)   11/13/17 50.7 kg (111 lb 11.2 oz)   07/17/17 50.8 kg (112 lb 1.6 oz)   05/16/17 51.3 kg (113 lb)   04/17/17 51.6 kg (113 lb 11.2 oz)       /64 (BP Location: Left arm, Patient Position: Sitting, Cuff Size: Adult Regular)   Pulse 87   Ht 1.575 m (5' 2.01\")   Wt 51 kg (112 lb 7 oz)   SpO2 97%   BMI 20.56 kg/m        Physical Exam  General Appearance: scoliosis noted;  well nourished and in no distress     Eyes:  conjutivae and extra-ocular motions are normal.                                    pupils round and reactive to light, no lid lag, no stare    HEENT:   oropharynx clear and moist, no JVD, no bruits      unable to palpate the thyroid, palpable small R medial supraclavicular mass, stable   Cardiovascular:  regular rhythm, no murmurs, distal pulse palpable, no edema  Respiratory:        chest clear, no rales, no rhonchi   Musculoskeletal:  normal tone and strength  Psychological:          affect depressed, judgment normal  Skin:  warm, no lesions  Neurological:  reflexes normal and symmetric, no resting tremor.     Lab Results  Negative thyroglobulin antibody and a " thyroglobulin level of 2.2 in November 2009.  9/27/13  FSH 5  Estradiol 68 (Postmenopausal range less than 54.7).    TSH   Date Value Ref Range Status   02/06/2018 1.71 0.40 - 4.00 mU/L Final     T4 Free   Date Value Ref Range Status   02/06/2018 1.15 0.76 - 1.46 ng/dL Final       Assessment/Plan    1. Hypothyroidism secondary to nahomy-thyroidectomy  The patient is, clinically, euthyroid.  Follow-up pending TFTs.    2. Right subcentimeter thyroid nodule - with no suspicious features on the prior thyroid USs. We'll continue to monitor clinically.    No orders of the defined types were placed in this encounter.

## 2019-04-10 NOTE — NURSING NOTE
"Cynthia Marie Barthel's goals for this visit include:   Chief Complaint   Patient presents with     Thyroid Disease     She requests these members of her care team be copied on today's visit information: Yes    PCP: Ramakrishna Boyer    Referring Provider:  Ramakrishna Boyer MD  [address] [place] [address]    /64 (BP Location: Left arm, Patient Position: Sitting, Cuff Size: Adult Regular)   Pulse 87   Ht 1.575 m (5' 2.01\")   Wt 51 kg (112 lb 7 oz)   SpO2 97%   BMI 20.56 kg/m      Do you need any medication refills at today's visit? Yes    "

## 2019-04-10 NOTE — LETTER
4/10/2019         RE: Cynthia Marie Barthel  [address]  [place] [address]        Dear Colleague,    Thank you for referring your patient, Cynthia Marie Barthel, to the [facility]. Please see a copy of my visit note below.      The patient is seen in f/up for thyroid nodules and hypothyroidism.    Today she feels good and she denies any specific complaints with the exception of hot flashes, which are persistent and may be more severe since she has not been exercising much.  She has been having hot flashes since 2017.  Mirena was discontinued in February this year and her menstrual periods did not resume.  The hot flashes got worse following the discontinuation of clonidine so she restarted it at a smaller dose.  Current levothyroxine replacement dose is 75  g daily.   She continues to deal with severe TMJ issues and insomnia.    Johanne first discovered a lump on the left side of the neck in the year 2000. Thyroid ultrasound revealed a dominant left thyroid nodule and a few small thyroid nodules present on the right thyroid lobe. The result of the fine needle aspiration of the left nodule was inconclusive. The patient underwent left hemithyroidectomy in November 2000.  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 There was no evidence of malignancy. A single parathyroid gland was removed. After surgery, she was started on levothyroxine. Subsequently, she had f/up thyroid ultrasounds done for a small right nodule in October 2012, December 2012, October 2013.     Past Medical History   Scoliosis - at birth   Fusion of the spine surgery   3 sinus surgeries   Allergies   Thyroid nodules   L hemithyroidectomy   Hypothyroidism post thyroidectomy   Mild sleep apnea - dx 2015 not using the CPAP machine   IUD for 8 years   Depression since 2 yo, treated since 2001    Current Medications    Current Outpatient Medications:      B Complex Vitamins (B  COMPLEX 1) TABS, Take  by mouth daily., Disp: , Rfl:      Calcium-Magnesium-Vitamin D (CITRACAL CALCIUM+D PO), Take 1 tablet by mouth daily, Disp: , Rfl:      cloNIDine (CATAPRES) 0.1 MG tablet, Take 0.1 mg by mouth 2 times daily, Disp: , Rfl:      cyclobenzaprine (FLEXERIL) 5 MG tablet, Take 1 tablet (5 mg) by mouth nightly as needed for muscle spasms, Disp: 30 tablet, Rfl: 3     fluorouracil (EFUDEX) 5 % cream, Use  Efudex twice daily for 2-3 weeks or until the onset of irritation., Disp: 40 g, Rfl: 0     lamoTRIgine (LAMICTAL) 100 MG tablet, Take 200 mg by mouth daily, Disp: , Rfl:      levothyroxine (SYNTHROID/LEVOTHROID) 75 MCG tablet, Take 1 tablet (75 mcg) by mouth daily, Disp: 90 tablet, Rfl: 0     MAGNESIUM OXIDE PO, Take 1 tablet by mouth daily, Disp: , Rfl:      Multiple Vitamin CAPS, Take  by mouth daily., Disp: , Rfl:      tazarotene 0.1 % CREA, Apply a pea-sized amount nightly to face. Keep away from eyes., Disp: 30 g, Rfl: 3     EPINEPHrine (EPIPEN/ADRENACLICK/OR ANY BX GENERIC EQUIV) 0.3 MG/0.3ML injection 2-pack, INJECT 0.3 MLS (0.3 MG) INTO THE MUSCLE AS NEEDED FOR ANAPHYLAXIS (Patient not taking: Reported on 2/25/2019), Disp: 0.6 mL, Rfl: 1     Glucosamine-Chondroitin (GLUCOSAMINE CHONDR COMPLEX PO), Reported on 5/16/2017, Disp: , Rfl:      hydroquinone 4 % CREA, Apply a thin layer to the face for up to 4 weeks in a row, take 8 weeks off then repeat as needed (Patient not taking: Reported on 2/25/2019), Disp: 30 g, Rfl: 1     Omega-3 Fatty Acids (FISH OIL PO), Take  by mouth daily., Disp: , Rfl:      vitamin E 400 UNIT capsule, Take by mouth daily Reported on 5/16/2017, Disp: , Rfl:     Family History  Father, grandfather, uncles had depression. No thyroid. No diabetes. Great uncles and aunts had colon cancer.  Maternal aunts - osteoporosis.     Social History  . No children. Quit smoking in 2016. Quit drinking alcohol.. Occupation: . Takes MVI, B complex, Mg.     Review of  "Systems   Systemic:              Weight stable, no sign fatigue   Eye:                      No eye symptoms   Krystle-Laryngeal:     No krystle-laryngeal symptoms, no dysphagia, no hoarseness, no cough     Breast:                  No breast symptoms  Cardiovascular:    No cardiovascular symptoms, no CP or palpitations   Pulmonary:           No pulmonary symptoms, no SOB or cough    Gastrointestinal:   No diarrhea or constipation   Genitourinary:       no increased thirst, no increased urination   Endocrine:            As above  Neurological:       Rare headaches in am, no tremor, numbness in her back longstaning, no dizziness   Musculoskeletal:  joint pain - hips, knees, neck - with morning stiffness    Skin:                     Mild dry skin, no hair falling out   Psychological:      Depression is controlled.     Vital Signs     Previous Weights:   Wt Readings from Last 10 Encounters:   04/10/19 51 kg (112 lb 7 oz)   04/01/19 48.5 kg (107 lb)   02/25/19 50 kg (110 lb 3.2 oz)   11/28/18 49.2 kg (108 lb 8 oz)   11/14/18 49.2 kg (108 lb 8 oz)   02/06/18 52.6 kg (116 lb)   11/13/17 50.7 kg (111 lb 11.2 oz)   07/17/17 50.8 kg (112 lb 1.6 oz)   05/16/17 51.3 kg (113 lb)   04/17/17 51.6 kg (113 lb 11.2 oz)       /64 (BP Location: Left arm, Patient Position: Sitting, Cuff Size: Adult Regular)   Pulse 87   Ht 1.575 m (5' 2.01\")   Wt 51 kg (112 lb 7 oz)   SpO2 97%   BMI 20.56 kg/m         Physical Exam  General Appearance: scoliosis noted;  well nourished and in no distress     Eyes:  conjutivae and extra-ocular motions are normal.                                    pupils round and reactive to light, no lid lag, no stare    HEENT:   oropharynx clear and moist, no JVD, no bruits      unable to palpate the thyroid, palpable small R medial supraclavicular mass, stable   Cardiovascular:  regular rhythm, no murmurs, distal pulse palpable, no edema  Respiratory:        chest clear, no rales, no rhonchi   Musculoskeletal:  " normal tone and strength  Psychological:          affect depressed, judgment normal  Skin:  warm, no lesions  Neurological:  reflexes normal and symmetric, no resting tremor.     Lab Results  Negative thyroglobulin antibody and a thyroglobulin level of 2.2 in November 2009.  9/27/13  FSH 5  Estradiol 68 (Postmenopausal range less than 54.7).    TSH   Date Value Ref Range Status   02/06/2018 1.71 0.40 - 4.00 mU/L Final     T4 Free   Date Value Ref Range Status   02/06/2018 1.15 0.76 - 1.46 ng/dL Final       Assessment/Plan    1. Hypothyroidism secondary to nahomy-thyroidectomy  The patient is, clinically, euthyroid.  Follow-up pending TFTs.    2. Right subcentimeter thyroid nodule - with no suspicious features on the prior thyroid USs. We'll continue to monitor clinically.    No orders of the defined types were placed in this encounter.                          Again, thank you for allowing me to participate in the care of your patient.        Sincerely,        Gabriela Muir MD

## 2019-06-20 ENCOUNTER — OFFICE VISIT (OUTPATIENT)
Dept: DERMATOLOGY | Facility: CLINIC | Age: 54
End: 2019-06-20
Payer: COMMERCIAL

## 2019-06-20 DIAGNOSIS — L57.8 PHOTOAGING OF SKIN: ICD-10-CM

## 2019-06-20 DIAGNOSIS — L70.0 ACNE VULGARIS: ICD-10-CM

## 2019-06-20 DIAGNOSIS — L81.4 SOLAR LENTIGINOSIS: Primary | ICD-10-CM

## 2019-06-20 DIAGNOSIS — L30.8 RETINOID DERMATITIS: ICD-10-CM

## 2019-06-20 PROCEDURE — 99213 OFFICE O/P EST LOW 20 MIN: CPT | Performed by: DERMATOLOGY

## 2019-06-20 RX ORDER — TAZAROTENE 1 MG/G
CREAM TOPICAL
Qty: 30 G | Refills: 11 | Status: SHIPPED | OUTPATIENT
Start: 2019-06-20 | End: 2020-09-18

## 2019-06-20 RX ORDER — HYDROCORTISONE 25 MG/G
OINTMENT TOPICAL
Qty: 30 G | Refills: 0 | Status: SHIPPED | OUTPATIENT
Start: 2019-06-20 | End: 2020-05-19

## 2019-06-20 RX ORDER — HYDROQUINONE 40 MG/G
CREAM TOPICAL
Qty: 30 G | Refills: 1 | Status: SHIPPED | OUTPATIENT
Start: 2019-06-20 | End: 2020-05-19

## 2019-06-20 ASSESSMENT — PAIN SCALES - GENERAL: PAINLEVEL: NO PAIN (0)

## 2019-06-20 NOTE — PATIENT INSTRUCTIONS
Referral to Dr. Maxwell, [facility], [address] [place] [id number], Phone: [phone]    Dermatology consultants    Associated skin cancer    Dr. Ayala in [place]

## 2019-06-20 NOTE — LETTER
6/20/2019         RE: Cynthia Marie Barthel  [address]  [place] [address]        Dear Colleague,    Thank you for referring your patient, Cynthia Marie Barthel, to the [facility]. Please see a copy of my visit note below.    [facility] Dermatology Note      Dermatology Problem List:  1. Actinic keratosis  -History of Efudex use with redness of the face and swelling, possible allergy, however recently used without problem.   2. Acne vulgaris  -Current Tx: Tazorac with improvement  3. Sebaceous hyperplasia  -s/p destruction with hyfrecator 1/15/2015  4. Photoaging  -Current tx:Tazorac  -3 vitalize peels by outside provider with no issues but limited improvement.     Encounter Date: Jun 20, 2019    CC:  Chief Complaint   Patient presents with     Derm Problem     Skin exam.  Brown spots on left cheek     Derm Problem     Redness on chest         History of Present Illness:  Ms. Cynthia Marie Barthel is a 53 year old female with a history of AKs who presents for a skin exam. She was last seen on 6/7/18 when an inflamed SK was found on the left mid back. Several treatments were also discussed for photoaging and skin laxity. Laser treatments were not an option due to expensive costs. Today she reports brown spots on the left cheek and redness on the chest. She has been using Tazorac on the cheek and chest. She has been using Tazorac almost every day. She notes no improvement on the cheeks, some on the chest. She never used the hydroquinone because it was too expensive. No other concerns.     Past Medical History:   Patient Active Problem List   Diagnosis     Postsurgical hypothyroidism     Giant papillary conjunctivitis     Cerebral gliosis     Cyst of pineal gland     BPV (benign positional vertigo), bilateral     Anxiety and depression     CARDIOVASCULAR SCREENING; LDL GOAL LESS THAN 160     Cervical high risk HPV (human papillomavirus) test positive     H/O  scoliosis     Disorder of right temporomandibular joint     Past Medical History:   Diagnosis Date     Arthritis      Cervical high risk HPV (human papillomavirus) test positive 11/13/2017     Past Surgical History:   Procedure Laterality Date     BACK SURGERY      shanks rods     COLONOSCOPY WITH CO2 INSUFFLATION N/A 4/5/2019    Procedure: COLONOSCOPY WITH CO2 INSUFFLATION;  Surgeon: Carol Toure MD;  Location: [facility] OR     ENT SURGERY      sinus     HEAD & NECK SURGERY      partial thyroidectomy        Social History:  Reviewed and unchanged but kept in chart for clinician convenience  The patient works as a an .    Family History:  Reviewed and unchanged but kept in chart for clinician convenience  There is an unknown family history of skin cancer in the patient's father.  There is a family history of hay fever.  There is no family history of asthma, psoriasis, or eczema.     Medications:  Current Outpatient Medications   Medication Sig Dispense Refill     B Complex Vitamins (B COMPLEX 1) TABS Take  by mouth daily.       Calcium-Magnesium-Vitamin D (CITRACAL CALCIUM+D PO) Take 1 tablet by mouth daily       cyclobenzaprine (FLEXERIL) 5 MG tablet Take 1 tablet (5 mg) by mouth nightly as needed for muscle spasms 30 tablet 3     EPINEPHrine (EPIPEN/ADRENACLICK/OR ANY BX GENERIC EQUIV) 0.3 MG/0.3ML injection 2-pack INJECT 0.3 MLS (0.3 MG) INTO THE MUSCLE AS NEEDED FOR ANAPHYLAXIS (Patient not taking: Reported on 2/25/2019) 0.6 mL 1     fluorouracil (EFUDEX) 5 % cream Use  Efudex twice daily for 2-3 weeks or until the onset of irritation. 40 g 0     Glucosamine-Chondroitin (GLUCOSAMINE CHONDR COMPLEX PO) Reported on 5/16/2017       lamoTRIgine (LAMICTAL) 100 MG tablet Take 200 mg by mouth daily       levothyroxine (SYNTHROID/LEVOTHROID) 75 MCG tablet Take 1 tablet (75 mcg) by mouth daily 90 tablet 3     Multiple Vitamin CAPS Take  by mouth daily.       Omega-3 Fatty Acids (FISH OIL PO) Take  by mouth  daily.       tazarotene 0.1 % CREA Apply a pea-sized amount nightly to face. Keep away from eyes. 30 g 3     tiZANidine (ZANAFLEX) 4 MG tablet TAKE 1 TABLET (4 MG) BY MOUTH NIGHTLY AS NEEDED FOR MUSCLE SPASMS 30 tablet 3     vitamin E 400 UNIT capsule Take by mouth daily Reported on 5/16/2017       Allergies   Allergen Reactions     Bees Swelling     Wasps [Hornets] Swelling     Hay Fever & [A.R.M.]      Review of Systems:  -Const: The patient is generally feeling well today.     Physical exam:  GEN: This is a well developed, well-nourished female in no acute distress, in a pleasant mood.  SKIN: Total skin excluding the undergarment areas was performed. The exam included the head/face, neck, both arms, chest, back, abdomen, both legs, digits and/or nails. Including buttock  - Scattered brown macules on sun exposed areas, trunk and extremities   - Mild macular eythema on the neck at the site of Tazorac application   - On the right upper arm pigmented macules adjacent total size is 7 mm  - No other lesions of concern on areas examined.     Impression/Plan:  1. History of actinic keratosis, s/p efudex-none      2. Solar lentigines and photoaging and skin laxity    Continue Tazorac. Refill provided     Continue CE Ferulic or use kojic acid.    Discussed Clear & Brilliant or Fraxel    Referral for possibly IPL as per her request - [facility],  [facility], Sara Ayala       Pt never used hydroquinone 4% cream, too expensive but will refill so its available if able     Sunscreen at last an SPF 50    Baseline photos obtained.     3. Retinoid dermatitis-neck, hold tazorac    Start hydrocortisone 2.5% ointment - apply BID daily for one week     4. On the right upper arm pigmented macules adjacent total size is 7 mm, consistent colliding nevi     Photograph and recheck     Refuses biopsy today    Follow up in 3 months for spot check right upper arm, earlier for new or changing lesions.      Staff Involved:  Scribe/Staff    Scribe Disclosure  I, Sherei Manley, am serving as a scribe to document services personally performed by Dr. Dariana Forde MD, based on data collection and the provider's statements to me.     Provider Disclosure:   The documentation recorded by the scribe accurately reflects the services I personally performed and the decisions made by me.    Dariana Forde MD    Department of Dermatology  [facility]: Phone: [phone], Fax:[phone]  [facility] [place]: Phone: [phone], Fax: [phone]              Again, thank you for allowing me to participate in the care of your patient.        Sincerely,        Dariana Forde MD

## 2019-06-20 NOTE — PROGRESS NOTES
[facility] Dermatology Note      Dermatology Problem List:  1. Actinic keratosis  -History of Efudex use with redness of the face and swelling, possible allergy, however recently used without problem.   2. Acne vulgaris  -Current Tx: Tazorac with improvement  3. Sebaceous hyperplasia  -s/p destruction with hyfrecator 1/15/2015  4. Photoaging  -Current tx:Tazorac  -3 vitalize peels by outside provider with no issues but limited improvement.     Encounter Date: Jun 20, 2019    CC:  Chief Complaint   Patient presents with     Derm Problem     Skin exam.  Brown spots on left cheek     Derm Problem     Redness on chest         History of Present Illness:  Ms. Cynthia Marie Barthel is a 53 year old female with a history of AKs who presents for a skin exam. She was last seen on 6/7/18 when an inflamed SK was found on the left mid back. Several treatments were also discussed for photoaging and skin laxity. Laser treatments were not an option due to expensive costs. Today she reports brown spots on the left cheek and redness on the chest. She has been using Tazorac on the cheek and chest. She has been using Tazorac almost every day. She notes no improvement on the cheeks, some on the chest. She never used the hydroquinone because it was too expensive. No other concerns.     Past Medical History:   Patient Active Problem List   Diagnosis     Postsurgical hypothyroidism     Giant papillary conjunctivitis     Cerebral gliosis     Cyst of pineal gland     BPV (benign positional vertigo), bilateral     Anxiety and depression     CARDIOVASCULAR SCREENING; LDL GOAL LESS THAN 160     Cervical high risk HPV (human papillomavirus) test positive     H/O scoliosis     Disorder of right temporomandibular joint     Past Medical History:   Diagnosis Date     Arthritis      Cervical high risk HPV (human papillomavirus) test positive 11/13/2017     Past Surgical History:   Procedure Laterality Date     BACK SURGERY       dimitris trivedi     COLONOSCOPY WITH CO2 INSUFFLATION N/A 4/5/2019    Procedure: COLONOSCOPY WITH CO2 INSUFFLATION;  Surgeon: Carol Toure MD;  Location: [facility] OR     ENT SURGERY      sinus     HEAD & NECK SURGERY      partial thyroidectomy        Social History:  Reviewed and unchanged but kept in chart for clinician convenience  The patient works as a an .    Family History:  Reviewed and unchanged but kept in chart for clinician convenience  There is an unknown family history of skin cancer in the patient's father.  There is a family history of hay fever.  There is no family history of asthma, psoriasis, or eczema.     Medications:  Current Outpatient Medications   Medication Sig Dispense Refill     B Complex Vitamins (B COMPLEX 1) TABS Take  by mouth daily.       Calcium-Magnesium-Vitamin D (CITRACAL CALCIUM+D PO) Take 1 tablet by mouth daily       cyclobenzaprine (FLEXERIL) 5 MG tablet Take 1 tablet (5 mg) by mouth nightly as needed for muscle spasms 30 tablet 3     EPINEPHrine (EPIPEN/ADRENACLICK/OR ANY BX GENERIC EQUIV) 0.3 MG/0.3ML injection 2-pack INJECT 0.3 MLS (0.3 MG) INTO THE MUSCLE AS NEEDED FOR ANAPHYLAXIS (Patient not taking: Reported on 2/25/2019) 0.6 mL 1     fluorouracil (EFUDEX) 5 % cream Use  Efudex twice daily for 2-3 weeks or until the onset of irritation. 40 g 0     Glucosamine-Chondroitin (GLUCOSAMINE CHONDR COMPLEX PO) Reported on 5/16/2017       lamoTRIgine (LAMICTAL) 100 MG tablet Take 200 mg by mouth daily       levothyroxine (SYNTHROID/LEVOTHROID) 75 MCG tablet Take 1 tablet (75 mcg) by mouth daily 90 tablet 3     Multiple Vitamin CAPS Take  by mouth daily.       Omega-3 Fatty Acids (FISH OIL PO) Take  by mouth daily.       tazarotene 0.1 % CREA Apply a pea-sized amount nightly to face. Keep away from eyes. 30 g 3     tiZANidine (ZANAFLEX) 4 MG tablet TAKE 1 TABLET (4 MG) BY MOUTH NIGHTLY AS NEEDED FOR MUSCLE SPASMS 30 tablet 3     vitamin E 400 UNIT capsule Take by mouth  daily Reported on 5/16/2017       Allergies   Allergen Reactions     Bees Swelling     Wasps [Hornets] Swelling     Hay Fever & [A.R.M.]      Review of Systems:  -Const: The patient is generally feeling well today.     Physical exam:  GEN: This is a well developed, well-nourished female in no acute distress, in a pleasant mood.  SKIN: Total skin excluding the undergarment areas was performed. The exam included the head/face, neck, both arms, chest, back, abdomen, both legs, digits and/or nails. Including buttock  - Scattered brown macules on sun exposed areas, trunk and extremities   - Mild macular eythema on the neck at the site of Tazorac application   - On the right upper arm pigmented macules adjacent total size is 7 mm  - No other lesions of concern on areas examined.     Impression/Plan:  1. History of actinic keratosis, s/p efudex-none      2. Solar lentigines and photoaging and skin laxity    Continue Tazorac. Refill provided     Continue CE Ferulic or use kojic acid.    Discussed Clear & Brilliant or Fraxel    Referral for possibly IPL as per her request - [facility],  [facility], Sara Ayala       Pt never used hydroquinone 4% cream, too expensive but will refill so its available if able     Sunscreen at last an SPF 50    Baseline photos obtained.     3. Retinoid dermatitis-neck, hold tazorac    Start hydrocortisone 2.5% ointment - apply BID daily for one week     4. On the right upper arm pigmented macules adjacent total size is 7 mm, consistent colliding nevi     Photograph and recheck     Refuses biopsy today    Follow up in 3 months for spot check right upper arm, earlier for new or changing lesions.     Staff Involved:  Scribe/Staff    Scribe Disclosure  I, Sherie Manley, am serving as a scribe to document services personally performed by Dr. Dariana Forde MD, based on data collection and the provider's statements to me.     Provider Disclosure:   The  documentation recorded by the scribe accurately reflects the services I personally performed and the decisions made by me.    Dariana Forde MD    Department of Dermatology  [facility]: Phone: [phone], Fax:[phone]  [facility]: Phone: [phone], Fax: [phone]

## 2019-06-20 NOTE — NURSING NOTE
Cynthia Marie Barthel's goals for this visit include:   Chief Complaint   Patient presents with     Derm Problem     Skin exam.  Brown spots on left cheek     Derm Problem     Redness on chest       She requests these members of her care team be copied on today's visit information: Ramakrishna Boyer      PCP: Ramakrishna Boyer    Referring Provider:  No referring provider defined for this encounter.    Atiya Barber RN

## 2019-08-26 ENCOUNTER — OFFICE VISIT (OUTPATIENT)
Dept: OPHTHALMOLOGY | Facility: CLINIC | Age: 54
End: 2019-08-26
Payer: COMMERCIAL

## 2019-08-26 DIAGNOSIS — S05.8X2A EYE TRAUMA, SUPERFICIAL, LEFT, INITIAL ENCOUNTER: Primary | ICD-10-CM

## 2019-08-26 PROCEDURE — 92012 INTRM OPH EXAM EST PATIENT: CPT | Performed by: OPHTHALMOLOGY

## 2019-08-26 ASSESSMENT — VISUAL ACUITY
OD_CC+: -1
CORRECTION_TYPE: CONTACTS
OD_CC: 20/80
OS_CC: 20/20
METHOD: SNELLEN - LINEAR

## 2019-08-26 ASSESSMENT — TONOMETRY
OD_IOP_MMHG: 13
IOP_METHOD: TONOPEN
OS_IOP_MMHG: 15

## 2019-08-26 ASSESSMENT — SLIT LAMP EXAM - LIDS
COMMENTS: NORMAL
COMMENTS: NORMAL

## 2019-08-26 ASSESSMENT — CUP TO DISC RATIO
OD_RATIO: 0.25
OS_RATIO: 0.2

## 2019-08-26 ASSESSMENT — EXTERNAL EXAM - LEFT EYE: OS_EXAM: NORMAL

## 2019-08-26 ASSESSMENT — EXTERNAL EXAM - RIGHT EYE: OD_EXAM: NORMAL

## 2019-08-26 NOTE — NURSING NOTE
Patient presents with:  Eye Problem Left Eye: Hit in left eye by bungee cord on Saturday. Currently wearing soft CLs. Small bruise on temporal corner of eye.      No results found for: A1C    Referring Provider:  No referring provider defined for this encounter.        Flores Brandon COT

## 2019-08-27 NOTE — PROGRESS NOTES
Assessment & Plan   Cynthia Marie Barthel is a 54 year old female who presents with:   Review of systems for the eyes was negative other than the pertinent positives and negatives noted in the HPI.    Eye trauma, superficial, left, initial encounter  - Bunge cord 2 days ago  - Normal exam  - Reasured    Return for routine annual when due.      Attending Physician Attestation:  Complete documentation of historical and exam elements from today's encounter can be found in the full encounter summary report (not reduplicated in this progress note).  I personally obtained the chief complaint(s) and history of present illness.  I confirmed and edited as necessary the review of systems, past medical/surgical history, family history, social history, and examination findings as documented by others; and I examined the patient myself.  I personally reviewed the relevant tests, images, and reports as documented above.  I formulated and edited as necessary the assessment and plan and discussed the findings and management plan with the patient and family. - Kenyon Rivera MD

## 2019-09-04 ENCOUNTER — OFFICE VISIT (OUTPATIENT)
Dept: OBGYN | Facility: CLINIC | Age: 54
End: 2019-09-04
Payer: COMMERCIAL

## 2019-09-04 VITALS
WEIGHT: 113 LBS | OXYGEN SATURATION: 98 % | DIASTOLIC BLOOD PRESSURE: 61 MMHG | BODY MASS INDEX: 20.66 KG/M2 | HEART RATE: 62 BPM | SYSTOLIC BLOOD PRESSURE: 92 MMHG

## 2019-09-04 DIAGNOSIS — R87.810 CERVICAL HIGH RISK HPV (HUMAN PAPILLOMAVIRUS) TEST POSITIVE: Primary | ICD-10-CM

## 2019-09-04 PROCEDURE — 57505 ENDOCERVICAL CURETTAGE: CPT | Performed by: OBSTETRICS & GYNECOLOGY

## 2019-09-04 PROCEDURE — 88175 CYTOPATH C/V AUTO FLUID REDO: CPT | Performed by: OBSTETRICS & GYNECOLOGY

## 2019-09-04 PROCEDURE — 99213 OFFICE O/P EST LOW 20 MIN: CPT | Mod: 25 | Performed by: OBSTETRICS & GYNECOLOGY

## 2019-09-04 PROCEDURE — 87624 HPV HI-RISK TYP POOLED RSLT: CPT | Performed by: OBSTETRICS & GYNECOLOGY

## 2019-09-04 PROCEDURE — 88305 TISSUE EXAM BY PATHOLOGIST: CPT | Performed by: OBSTETRICS & GYNECOLOGY

## 2019-09-04 RX ORDER — LAMOTRIGINE 200 MG/1
TABLET ORAL
Refills: 3 | COMMUNITY
Start: 2019-07-31 | End: 2020-05-19

## 2019-09-04 NOTE — PROGRESS NOTES
"This 53 y/o female, , s/p menopause, presents for a recheck pap and ECC due to her hx of \"other\" high-risk HPV subtypes on 17 and 18.  Her ECC on 19 showed questionable grade of dysplasia so will recheck today.  She denies any continue risk of HPV exposure since she is abstinent since discovering that her significant other x the past 7 years was cheating on her.  She declines STD screening, however, since she is no longer at risk.  BP 92/61   Pulse 62   Wt 51.3 kg (113 lb)   SpO2 98%   Breastfeeding? No   BMI 20.66 kg/m    ROS:  10 systems were reviewed and the positives were listed under problems.  Informed consent was reviewed and obtained for the ECC procedure.  A bi-valve speculum was placed and the pap smear was obtained and submitted.  Next the endocervical curettings were obtained and submitted.  She tolerated both procedures well and the speculum was removed with counts being correct.  F/u instructions were reviewed with the patient and her questions and concerns were addressed.  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  F/u directions were reviewed and all her questions and concerns were addressed.  This was a 20-minute visit and over 50% of the time was spent in direct patient consultation and an additional 5 minutes were spent in procedure.    "

## 2019-09-04 NOTE — PATIENT INSTRUCTIONS
If you have any questions regarding your visit, Please contact your care team.    [employer]: [phone]      [facility] HOURS TELEPHONE NUMBER   Tanika DO Pam.    SOPHIE Lopez -    JUDY Carrasquillo, JUDY Cordova RN     Monday, Wednesday, Thursday and Friday, [place]  8:30a.m-5:00 p.m   [facility]  [address] [place] [address]  [phone] ask for [facility]    Imaging [phone]       Urgent Care locations:    [facility] Saturday and Sunday   9 am - 5 pm    Monday-Friday   12 pm - 8 pm  Saturday and Sunday   9 am - 5 pm   [phone]     [facility] Labor and Delivery:  [phone]    If you need a medication refill, please contact your pharmacy. Please allow 3 business days for your refill to be completed.  As always, Thank you for trusting us with your healthcare needs!

## 2019-09-06 LAB — COPATH REPORT: NORMAL

## 2019-09-11 LAB
COPATH REPORT: NORMAL
PAP: NORMAL

## 2019-09-28 ENCOUNTER — HEALTH MAINTENANCE LETTER (OUTPATIENT)
Age: 54
End: 2019-09-28

## 2019-11-05 ENCOUNTER — TELEPHONE (OUTPATIENT)
Dept: PEDIATRICS | Facility: CLINIC | Age: 54
End: 2019-11-05

## 2019-11-05 NOTE — TELEPHONE ENCOUNTER
MTM referral from: Patient's insurance ([employer] payor products)    MTM referral outreach attempt #3 on November 5, 2019 at 1:00 PM      Outcome: Left Message    Abbey Nam, MTM coordinator intern

## 2019-11-11 NOTE — TELEPHONE ENCOUNTER
MTM referral from: patient's insurance ([employer])    Attempt #4 on 11/11/19 at 12:27pm    Outcome: no answer, no message     Thank you,  Sonja Rubi, MTM Coordinator Intern

## 2019-12-16 ENCOUNTER — TELEPHONE (OUTPATIENT)
Dept: ENDOCRINOLOGY | Facility: CLINIC | Age: 54
End: 2019-12-16

## 2019-12-16 NOTE — TELEPHONE ENCOUNTER
1st Attempt Recall Letter mailed 12/02/2019.    2nd Attempt LVM for Johanne to call back to schedule her 1 yr follow up in April 2020 with Dr Muir. Patient will also need to have labs draw a few day prior to her follow up appointment. I asked her to call [phone] to schedule.     Randy Hodgson  Procedure , [place]  [facility] Specialty and Adult Endocrinology

## 2020-01-02 NOTE — TELEPHONE ENCOUNTER
3rd Attempt Letter sent to patient requesting a call back to schedule her follow up appointment with Dr Muir for April 2020. Patient will also need to have her labs drawn a few days prior to her appt.     Randy Hodgson  Procedure , [facility] Specialty and Adult Endocrinology

## 2020-02-19 ENCOUNTER — OFFICE VISIT (OUTPATIENT)
Dept: OPTOMETRY | Facility: CLINIC | Age: 55
End: 2020-02-19
Payer: COMMERCIAL

## 2020-02-19 DIAGNOSIS — Z01.00 EXAMINATION OF EYES AND VISION: Primary | ICD-10-CM

## 2020-02-19 DIAGNOSIS — H52.222 REGULAR ASTIGMATISM OF LEFT EYE: ICD-10-CM

## 2020-02-19 DIAGNOSIS — H52.13 MYOPIA OF BOTH EYES: ICD-10-CM

## 2020-02-19 DIAGNOSIS — H10.13 ALLERGIC CONJUNCTIVITIS OF BOTH EYES: ICD-10-CM

## 2020-02-19 DIAGNOSIS — H52.4 PRESBYOPIA: ICD-10-CM

## 2020-02-19 PROCEDURE — 92310 CONTACT LENS FITTING OU: CPT | Performed by: OPTOMETRIST

## 2020-02-19 PROCEDURE — 92014 COMPRE OPH EXAM EST PT 1/>: CPT | Performed by: OPTOMETRIST

## 2020-02-19 PROCEDURE — 92015 DETERMINE REFRACTIVE STATE: CPT | Performed by: OPTOMETRIST

## 2020-02-19 RX ORDER — OLOPATADINE HYDROCHLORIDE 2 MG/ML
1 SOLUTION/ DROPS OPHTHALMIC DAILY
Qty: 1 BOTTLE | Refills: 11 | Status: CANCELLED | OUTPATIENT
Start: 2020-02-19 | End: 2021-02-18

## 2020-02-19 RX ORDER — VENLAFAXINE HYDROCHLORIDE 37.5 MG/1
CAPSULE, EXTENDED RELEASE ORAL
COMMUNITY
Start: 2019-12-23 | End: 2020-05-19 | Stop reason: DRUGHIGH

## 2020-02-19 ASSESSMENT — REFRACTION_MANIFEST
OS_SPHERE: -4.25
OD_AXIS: 005
OD_CYLINDER: +0.50
OS_ADD: +2.25
OD_ADD: +2.25
OD_SPHERE: -5.25

## 2020-02-19 ASSESSMENT — REFRACTION_WEARINGRX
OD_CYLINDER: +0.50
OD_CYLINDER: +0.50
OS_ADD: +2.00
OD_AXIS: 005
SPECS_TYPE: INTERMEDIATE
OD_SPHERE: -5.00
OS_SPHERE: -3.00
OS_SPHERE: -4.25
OD_AXIS: 005
OD_SPHERE: -3.75
OD_ADD: +2.00

## 2020-02-19 ASSESSMENT — EXTERNAL EXAM - RIGHT EYE: OD_EXAM: NORMAL

## 2020-02-19 ASSESSMENT — CUP TO DISC RATIO
OD_RATIO: 0.25
OS_RATIO: 0.2

## 2020-02-19 ASSESSMENT — REFRACTION_CURRENTRX
OD_DIAMETER: 13.8
OS_DIAMETER: 13.8
OS_BRAND: AIR OPTIX NIGHT AND DAY
OD_BRAND: AIR OPTIX NIGHT AND DAY
OD_SPHERE: -3.00
OS_BASECURVE: 8.40
OD_BASECURVE: 8.40
OS_SPHERE: -4.00

## 2020-02-19 ASSESSMENT — CONF VISUAL FIELD
OS_NORMAL: 1
OD_NORMAL: 1
METHOD: COUNTING FINGERS

## 2020-02-19 ASSESSMENT — VISUAL ACUITY
OD_CC: 20/125
METHOD: SNELLEN - LINEAR
OS_CC: 20/100
OS_CC: 20/20
OD_CC: 20/30
CORRECTION_TYPE: CONTACTS

## 2020-02-19 ASSESSMENT — TONOMETRY
OD_IOP_MMHG: 17
OS_IOP_MMHG: 19
IOP_METHOD: TONOPEN

## 2020-02-19 ASSESSMENT — EXTERNAL EXAM - LEFT EYE: OS_EXAM: NORMAL

## 2020-02-19 NOTE — PATIENT INSTRUCTIONS
Eyeglass prescription given.    Contact lens prescription given.  Thre is a higher risk of eye infections and corneal ulcers with extended wear (sleeping in lenses).  I recommend you take them out nightly.    Return in 1 year for a complete eye exam or sooner if needed.    Hi Daily, ANN MARIE    The affects of the dilating drops last for 4- 6 hours.  You will be more sensitive to light and vision will be blurry up close.  Mydriatic sunglasses were given if needed.      Optometry Providers       Clinic Locations                                 Telephone Number   Dr. Michelle Magana   [place]  [place] [phone]     [place] [place] [facility] Hours:                [place] [facility] Hours:       [facility] Hours:   [address] [facility] [address]   [address]      [address] [place] [address]   [place] [address]    [place] [address]  Phone: [phone]                    Phone: [phone]     Phone: [phone]                      Monday 8:00-7:00                          Monday 8:00-7:00                          Monday 8:00-7:00              Tuesday 8:00-6:00                          Tuesday 8:00-7:00                          Tuesday 8:00-7:00              Wednesday 8:00-6:00                  Wednesday 8:00-7:00                   Wednesday 8:00-7:00      Thursday 8:00-6:00                        Thursday 8:00-7:00                         Thursday 8:00-7:00            Friday 8:00-5:00                              Friday 8:00-5:00                              Friday 8:00-5:00    [facility] Hours:   [address] [place] [address]  [phone]    Monday 8:00-7:00  Tuesday 8:00-7:00  Wednesday 8:00-7:00  Thursday 8:00-7:00  Friday 8:00-5:00  Please log on to [email or web address] to order your contact lenses.  The link is found on the [facility] Care [facility] Vision Services page.  As always, Thank you for trusting us with your health care  needs!

## 2020-03-15 ENCOUNTER — HEALTH MAINTENANCE LETTER (OUTPATIENT)
Age: 55
End: 2020-03-15

## 2020-05-19 ENCOUNTER — VIRTUAL VISIT (OUTPATIENT)
Dept: PEDIATRICS | Facility: CLINIC | Age: 55
End: 2020-05-19
Payer: COMMERCIAL

## 2020-05-19 DIAGNOSIS — E89.0 POSTSURGICAL HYPOTHYROIDISM: ICD-10-CM

## 2020-05-19 DIAGNOSIS — R53.82 CHRONIC FATIGUE: ICD-10-CM

## 2020-05-19 DIAGNOSIS — D50.9 IRON DEFICIENCY ANEMIA, UNSPECIFIED IRON DEFICIENCY ANEMIA TYPE: Primary | ICD-10-CM

## 2020-05-19 DIAGNOSIS — Z12.31 VISIT FOR SCREENING MAMMOGRAM: ICD-10-CM

## 2020-05-19 DIAGNOSIS — G47.00 INSOMNIA, UNSPECIFIED TYPE: ICD-10-CM

## 2020-05-19 DIAGNOSIS — F32.A ANXIETY AND DEPRESSION: ICD-10-CM

## 2020-05-19 DIAGNOSIS — F41.9 ANXIETY AND DEPRESSION: ICD-10-CM

## 2020-05-19 DIAGNOSIS — R53.82 CHRONIC FATIGUE: Primary | ICD-10-CM

## 2020-05-19 LAB
ALBUMIN SERPL-MCNC: 4.2 G/DL (ref 3.4–5)
ALP SERPL-CCNC: 94 U/L (ref 40–150)
ALT SERPL W P-5'-P-CCNC: 34 U/L (ref 0–50)
ANION GAP SERPL CALCULATED.3IONS-SCNC: 4 MMOL/L (ref 3–14)
AST SERPL W P-5'-P-CCNC: 20 U/L (ref 0–45)
BASOPHILS # BLD AUTO: 0.1 10E9/L (ref 0–0.2)
BASOPHILS NFR BLD AUTO: 1.2 %
BILIRUB SERPL-MCNC: 0.3 MG/DL (ref 0.2–1.3)
BUN SERPL-MCNC: 16 MG/DL (ref 7–30)
CALCIUM SERPL-MCNC: 8.8 MG/DL (ref 8.5–10.1)
CHLORIDE SERPL-SCNC: 107 MMOL/L (ref 94–109)
CO2 SERPL-SCNC: 27 MMOL/L (ref 20–32)
CREAT SERPL-MCNC: 0.66 MG/DL (ref 0.52–1.04)
DIFFERENTIAL METHOD BLD: ABNORMAL
EOSINOPHIL # BLD AUTO: 0.2 10E9/L (ref 0–0.7)
EOSINOPHIL NFR BLD AUTO: 2.8 %
ERYTHROCYTE [DISTWIDTH] IN BLOOD BY AUTOMATED COUNT: 15.7 % (ref 10–15)
FERRITIN SERPL-MCNC: 6 NG/ML (ref 8–252)
GFR SERPL CREATININE-BSD FRML MDRD: >90 ML/MIN/{1.73_M2}
GLUCOSE SERPL-MCNC: 99 MG/DL (ref 70–99)
HCT VFR BLD AUTO: 36.1 % (ref 35–47)
HGB BLD-MCNC: 11 G/DL (ref 11.7–15.7)
IMM GRANULOCYTES # BLD: 0 10E9/L (ref 0–0.4)
IMM GRANULOCYTES NFR BLD: 0.3 %
IRON SATN MFR SERPL: 8 % (ref 15–46)
IRON SERPL-MCNC: 37 UG/DL (ref 35–180)
LYMPHOCYTES # BLD AUTO: 2.5 10E9/L (ref 0.8–5.3)
LYMPHOCYTES NFR BLD AUTO: 32.6 %
MCH RBC QN AUTO: 25.2 PG (ref 26.5–33)
MCHC RBC AUTO-ENTMCNC: 30.5 G/DL (ref 31.5–36.5)
MCV RBC AUTO: 83 FL (ref 78–100)
MONOCYTES # BLD AUTO: 0.6 10E9/L (ref 0–1.3)
MONOCYTES NFR BLD AUTO: 8.5 %
NEUTROPHILS # BLD AUTO: 4.1 10E9/L (ref 1.6–8.3)
NEUTROPHILS NFR BLD AUTO: 54.6 %
PLATELET # BLD AUTO: 377 10E9/L (ref 150–450)
POTASSIUM SERPL-SCNC: 3.6 MMOL/L (ref 3.4–5.3)
PROT SERPL-MCNC: 8 G/DL (ref 6.8–8.8)
RBC # BLD AUTO: 4.36 10E12/L (ref 3.8–5.2)
SODIUM SERPL-SCNC: 138 MMOL/L (ref 133–144)
TIBC SERPL-MCNC: 474 UG/DL (ref 240–430)
TSH SERPL DL<=0.005 MIU/L-ACNC: 1.01 MU/L (ref 0.4–4)
VIT B12 SERPL-MCNC: 781 PG/ML (ref 193–986)
WBC # BLD AUTO: 7.5 10E9/L (ref 4–11)

## 2020-05-19 PROCEDURE — 99214 OFFICE O/P EST MOD 30 MIN: CPT | Mod: TEL | Performed by: FAMILY MEDICINE

## 2020-05-19 PROCEDURE — 82728 ASSAY OF FERRITIN: CPT | Performed by: FAMILY MEDICINE

## 2020-05-19 PROCEDURE — 83540 ASSAY OF IRON: CPT | Performed by: FAMILY MEDICINE

## 2020-05-19 PROCEDURE — 80050 GENERAL HEALTH PANEL: CPT | Performed by: FAMILY MEDICINE

## 2020-05-19 PROCEDURE — 82306 VITAMIN D 25 HYDROXY: CPT | Performed by: FAMILY MEDICINE

## 2020-05-19 PROCEDURE — 83550 IRON BINDING TEST: CPT | Performed by: FAMILY MEDICINE

## 2020-05-19 PROCEDURE — 82607 VITAMIN B-12: CPT | Performed by: FAMILY MEDICINE

## 2020-05-19 PROCEDURE — 36415 COLL VENOUS BLD VENIPUNCTURE: CPT | Performed by: FAMILY MEDICINE

## 2020-05-19 RX ORDER — VENLAFAXINE HYDROCHLORIDE 150 MG/1
1 CAPSULE, EXTENDED RELEASE ORAL DAILY
COMMUNITY
Start: 2020-03-23 | End: 2023-06-13

## 2020-05-19 RX ORDER — TRAZODONE HYDROCHLORIDE 50 MG/1
25-50 TABLET, FILM COATED ORAL
Qty: 30 TABLET | Refills: 3 | Status: SHIPPED | OUTPATIENT
Start: 2020-05-19 | End: 2022-03-09

## 2020-05-19 ASSESSMENT — PATIENT HEALTH QUESTIONNAIRE - PHQ9
SUM OF ALL RESPONSES TO PHQ QUESTIONS 1-9: 11
5. POOR APPETITE OR OVEREATING: NOT AT ALL

## 2020-05-19 ASSESSMENT — ANXIETY QUESTIONNAIRES
3. WORRYING TOO MUCH ABOUT DIFFERENT THINGS: NOT AT ALL
5. BEING SO RESTLESS THAT IT IS HARD TO SIT STILL: NOT AT ALL
1. FEELING NERVOUS, ANXIOUS, OR ON EDGE: MORE THAN HALF THE DAYS
IF YOU CHECKED OFF ANY PROBLEMS ON THIS QUESTIONNAIRE, HOW DIFFICULT HAVE THESE PROBLEMS MADE IT FOR YOU TO DO YOUR WORK, TAKE CARE OF THINGS AT HOME, OR GET ALONG WITH OTHER PEOPLE: NOT DIFFICULT AT ALL
2. NOT BEING ABLE TO STOP OR CONTROL WORRYING: SEVERAL DAYS
GAD7 TOTAL SCORE: 3
7. FEELING AFRAID AS IF SOMETHING AWFUL MIGHT HAPPEN: NOT AT ALL
6. BECOMING EASILY ANNOYED OR IRRITABLE: NOT AT ALL

## 2020-05-19 NOTE — PROGRESS NOTES
"Cynthia Marie Barthel is a 54 year old female who is being evaluated via a billable telephone visit.      The patient has been notified of following:     \"This telephone visit will be conducted via a call between you and your physician/provider. We have found that certain health care needs can be provided without the need for a physical exam.  This service lets us provide the care you need with a short phone conversation.  If a prescription is necessary we can send it directly to your pharmacy.  If lab work is needed we can place an order for that and you can then stop by our lab to have the test done at a later time.    Telephone visits are billed at different rates depending on your insurance coverage. During this emergency period, for some insurers they may be billed the same as an in-person visit.  Please reach out to your insurance provider with any questions.    If during the course of the call the physician/provider feels a telephone visit is not appropriate, you will not be charged for this service.\"    Patient has given verbal consent for Telephone visit?  Yes    What phone number would you like to be contacted at?     How would you like to obtain your AVS? Temi Bond     Cynthia Marie Barthel is a 54 year old female who presents via phone visit today for the following health issues:  Patient is here for a telephone visit instead of in person visit due to the current COVID-19 pandemic.  Patient with past medical history significant for depression, anxiety, postsurgical hypothyroidism is here for a telephone visit with concerns of having ongoing at approximately worsening feeling of exhaustion, low energy for the past 6 months associated with feeling slightly short of breath while on exertion including getting up and down the stairs.  Patient denies dizziness, syncope, leg swelling, tingling, dizziness, weakness of extremities.  She denies previous history of anemia, abnormal bleeding " "problems.  Patient tries to avoid red meat, she thinks that she could be low again nutrients and is requesting to have lab checks done for further evaluation  She is currently taking 75 MCG of levothyroxine for postsurgical hypothyroidism  Patient is also taking a B complex vitamin daily  She has chronic depression and anxiety which is well controlled on current dose of Effexor 150 mg once daily, currently seeing a psychiatrist.  Patient has been having significantly interrupted sleep for the past 2 months due to situational stress from cope with pandemic and work situation.  Patient is in an administrative position at work.  She tried 1 dose of Ambien that she had from few years ago, patient is requesting medication to help with sleep  Patient denies use of tobacco, alcohol, recreational drugs  Drinks 1 to 2 cups of coffee a day  Denies snoring, sleep apnea  Denies cold or heat intolerance, weight loss or gain, change in bowel movements, hair loss or thinning, chest pain, palpitations, SOB, edema legs or eyes, dry skin, memory problems.      HPI     1. Patient notes has had low energy and feeling fatigue in the last 6 months. Has been been trying to be vegan the last 2 years and eating less meat. Concerned that her low energy levels are due to low iron. Would like to get some blood work and recommendation on how much iron she should take. Notes will feel \"winded\" with exertion and get SOB. Denies fever and URI symptoms.     Depression and Anxiety Follow-Up    How are you doing with your depression since your last visit? No change    How are you doing with your anxiety since your last visit?  Worsened since covid19    Are you having other symptoms that might be associated with depression or anxiety? Yes:  sleep    Have you had a significant life event? No     Do you have any concerns with your use of alcohol or other drugs? No    Social History     Tobacco Use     Smoking status: Former Smoker     Types: Cigarettes "     Last attempt to quit: 2/10/2014     Years since quittin.2     Smokeless tobacco: Never Used   Substance Use Topics     Alcohol use: No     Drug use: No     PHQ 2017   PHQ-9 Total Score 2 12 11   Q9: Thoughts of better off dead/self-harm past 2 weeks Not at all Not at all Not at all     RITA-7 SCORE 2017   Total Score 4 10 3     Last PHQ-9 2020   1.  Little interest or pleasure in doing things 3   2.  Feeling down, depressed, or hopeless 0   3.  Trouble falling or staying asleep, or sleeping too much 3   4.  Feeling tired or having little energy 3   5.  Poor appetite or overeating 2   6.  Feeling bad about yourself 0   7.  Trouble concentrating 0   8.  Moving slowly or restless 0   Q9: Thoughts of better off dead/self-harm past 2 weeks 0   PHQ-9 Total Score 11   Difficulty at work, home, or with people Extremely dIfficult     RITA-7  2020   1. Feeling nervous, anxious, or on edge 2   2. Not being able to stop or control worrying 1   3. Worrying too much about different things 0   4. Trouble relaxing 0   5. Being so restless that it is hard to sit still 0   6. Becoming easily annoyed or irritable 0   7. Feeling afraid, as if something awful might happen 0   RITA-7 Total Score 3   If you checked any problems, how difficult have they made it for you to do your work, take care of things at home, or get along with other people? Not difficult at all     In the past two weeks have you had thoughts of suicide or self-harm?  No.    Do you have concerns about your personal safety or the safety of others?   No    Suicide Assessment Five-step Evaluation and Treatment (SAFE-T)      How many servings of fruits and vegetables do you eat daily?  2-3    On average, how many sweetened beverages do you drink each day (Examples: soda, juice, sweet tea, etc.  Do NOT count diet or artificially sweetened beverages)?   0    How many days per week do you exercise enough to make  your heart beat faster? 3 or less    How many minutes a day do you exercise enough to make your heart beat faster? 9 or less    How many days per week do you miss taking your medication? 0    Insomnia  Onset: 2-3 months    Description:   Time to fall asleep (sleep latency): can take up to many hours, sometimes she will be up until 5:00AM  Middle of night awakening:  no  Early morning awakening:  YES    Progression of Symptoms:  worsening    Accompanying Signs & Symptoms:  Daytime sleepiness/napping: YES  Excessive snoring/apnea: no  Restless legs: no  Frequent urination: no  Chronic pain:  no    History:  Prior Insomnia: YES    Precipitating factors:   New stressful situation: YES- covid19  Caffeine intake: YES- only coffee in the morning  OTC decongestants: YES- occasional benadryl  Any new medications: no    Alleviating factors:  Self medicating (alcohol, etc.):  no    Therapies Tried and outcome: has tried Ambien about 4 years ago and would like a new script.            Patient Active Problem List   Diagnosis     Postsurgical hypothyroidism     Giant papillary conjunctivitis     Cerebral gliosis     Cyst of pineal gland     BPV (benign positional vertigo), bilateral     Anxiety and depression     CARDIOVASCULAR SCREENING; LDL GOAL LESS THAN 160     Cervical high risk HPV (human papillomavirus) test positive     H/O scoliosis     Disorder of right temporomandibular joint     Past Surgical History:   Procedure Laterality Date     BACK SURGERY      shanks rods     COLONOSCOPY WITH CO2 INSUFFLATION N/A 2019    Procedure: COLONOSCOPY WITH CO2 INSUFFLATION;  Surgeon: Carol Toure MD;  Location: [facility] OR     ENT SURGERY      sinus     HEAD & NECK SURGERY      partial thyroidectomy       Social History     Tobacco Use     Smoking status: Former Smoker     Types: Cigarettes     Last attempt to quit: 2/10/2014     Years since quittin.2     Smokeless tobacco: Never Used   Substance Use Topics     Alcohol use:  No     Family History   Problem Relation Age of Onset     Hypertension Mother      Substance Abuse Mother      Depression Father      Substance Abuse Father      Cerebrovascular Disease Maternal Grandfather      Hypertension Maternal Aunt      Glaucoma Maternal Aunt      Macular Degeneration Maternal Aunt      Cataracts Maternal Aunt      Hypertension Maternal Uncle      Hypertension Paternal Aunt      Diabetes Paternal Uncle      Hypertension Paternal Uncle      Cancer No family hx of      Thyroid Disease No family hx of          Current Outpatient Medications   Medication Sig Dispense Refill     B Complex Vitamins (B COMPLEX 1) TABS Take  by mouth daily.       Calcium-Magnesium-Vitamin D (CITRACAL CALCIUM+D PO) Take 1 tablet by mouth daily       cyclobenzaprine (FLEXERIL) 5 MG tablet Take 1 tablet (5 mg) by mouth nightly as needed for muscle spasms 30 tablet 3     EPINEPHrine (EPIPEN/ADRENACLICK/OR ANY BX GENERIC EQUIV) 0.3 MG/0.3ML injection 2-pack INJECT 0.3 MLS (0.3 MG) INTO THE MUSCLE AS NEEDED FOR ANAPHYLAXIS 0.6 mL 1     fluorouracil (EFUDEX) 5 % cream Use  Efudex twice daily for 2-3 weeks or until the onset of irritation. 40 g 0     Glucosamine-Chondroitin (GLUCOSAMINE CHONDR COMPLEX PO) Take by mouth as needed Reported on 5/16/2017       levothyroxine (SYNTHROID/LEVOTHROID) 75 MCG tablet Take 1 tablet (75 mcg) by mouth daily 90 tablet 3     Multiple Vitamin CAPS Take  by mouth daily.       tazarotene (TAZORAC) 0.1 % external cream Apply a pea-sized amount nightly to face. Keep away from eyes. 30 g 11     tiZANidine (ZANAFLEX) 4 MG tablet TAKE 1 TABLET (4 MG) BY MOUTH NIGHTLY AS NEEDED FOR MUSCLE SPASMS 30 tablet 3     traZODone (DESYREL) 50 MG tablet Take 0.5-1 tablets (25-50 mg) by mouth nightly as needed for sleep 30 tablet 3     venlafaxine (EFFEXOR-XR) 150 MG 24 hr capsule Take 1 capsule by mouth daily       Omega-3 Fatty Acids (FISH OIL PO) Take  by mouth daily.       vitamin E 400 UNIT capsule Take  by mouth as needed Reported on 5/16/2017       Allergies   Allergen Reactions     Bees Swelling     Wasps [Hornets] Swelling     Hay Fever & [A.R.M.]      Recent Labs   Lab Test 04/10/19  1523 11/14/18  0847 02/06/18  1046 11/13/17  1518   LDL  --  106*  --  110*   HDL  --  112  --  138   TRIG  --  49  --  40   ALT  --  26  --  28   CR  --  0.79  --  0.78   GFRESTIMATED  --  76  --  77   GFRESTBLACK  --  >90  --  >90   POTASSIUM  --  3.6  --  3.8   TSH 1.12  --  1.71  --       BP Readings from Last 3 Encounters:   09/04/19 92/61   04/10/19 100/64   04/05/19 118/75    Wt Readings from Last 3 Encounters:   09/04/19 51.3 kg (113 lb)   04/10/19 51 kg (112 lb 7 oz)   04/01/19 48.5 kg (107 lb)                    Reviewed and updated as needed this visit by Provider         Review of Systems   CONSTITUTIONAL:fatigue  INTEGUMENTARY/SKIN: NEGATIVE for worrisome rashes, moles or lesions  ENT/MOUTH: NEGATIVE for ear, mouth and throat problems  RESP: SOB  CV: NEGATIVE for chest pain, palpitations or peripheral edema  GI: NEGATIVE for nausea, abdominal pain, heartburn, or change in bowel habits  MUSCULOSKELETAL: NEGATIVE for significant arthralgias or myalgia  NEURO: NEGATIVE for weakness, dizziness or paresthesias  ENDOCRINE: NEGATIVE for temperature intolerance, skin/hair changes and postsurgical hypothyroidism  HEME/ALLERGY/IMMUNE: NEGATIVE for bleeding problems  PSYCHIATRIC: NEGATIVE for changes in mood or affect and history of depression and anxiety       Objective   Reported vitals:  Breastfeeding No    healthy, alert and no distress  PSYCH: Alert and oriented times 3; coherent speech, normal   rate and volume, able to articulate logical thoughts, able   to abstract reason, no tangential thoughts, no hallucinations   or delusions  Her affect is normal  RESP: No cough, no audible wheezing, able to talk in full sentences  Remainder of exam unable to be completed due to telephone visits    Diagnostic Test Results:  Labs  reviewed in Epic        Assessment/Plan:  1. Chronic fatigue  ddx-anemia/metabolic/voiding deficiency/iron deficiency/hypothyroidism  Recommended to have labs done for further evaluation  Will f/u on results and call with recommendations.  Patient verbalised understanding and is agreeable to the plan.    - CBC with platelets differential; Future  - **Comprehensive metabolic panel FUTURE anytime; Future  - **TSH with free T4 reflex FUTURE anytime; Future  - **Vitamin D Deficiency FUTURE anytime; Future  - Vitamin B12; Future  - Ferritin; Future  - Iron and iron binding capacity; Future    2. Postsurgical hypothyroidism  Patient is taking 75 MCG of levothyroxine currently  - **TSH with free T4 reflex FUTURE anytime; Future    3. Insomnia, unspecified type  Reviewed sleep hygiene  Recommended to give a trial of trazodone  Follow-up through my chart in 2 weeks if no better or sooner if needed.  Will consider hydroxyzine as needed  for persistent or worsening concerns  Dosing and potential medication side effects discussed.  Patient verbalised understanding and is agreeable to the plan.    - traZODone (DESYREL) 50 MG tablet; Take 0.5-1 tablets (25-50 mg) by mouth nightly as needed for sleep  Dispense: 30 tablet; Refill: 3    4. Anxiety and depression  Well-controlled symptoms on current dose of Effexor 150 mg daily      No follow-ups on file.      Phone call duration:  11:17 minutes  Chart documentation done in part with Dragon Voice recognition Software. Although reviewed after completion, some word and grammatical error may remain.    Ramakrishna Boyer MD

## 2020-05-19 NOTE — PATIENT INSTRUCTIONS
Get the labs today  Schedule for mammogram on 7/21  Take trazodone 25 to 50 mg once at bedtime as needed for sleep

## 2020-05-20 LAB — DEPRECATED CALCIDIOL+CALCIFEROL SERPL-MC: 29 UG/L (ref 20–75)

## 2020-05-20 ASSESSMENT — ANXIETY QUESTIONNAIRES: GAD7 TOTAL SCORE: 3

## 2020-05-20 NOTE — RESULT ENCOUNTER NOTE
Dear Johanne,  Your lab test showed low hemoglobin and very low ferritin as you anticipated.  I would recommend to start taking your iron supplements with vitamin C  twice a day.  Your vitamin D is in the low normal range, you will benefit from taking over-the-counter vitamin D 2000 units daily.  The other test results including electrolytes, liver and kidney functions, vitamin B12 and thyroid functions are all in normal range, this is good.  Please have a lab recheck in 4 to 6 weeks .   Let me know if you have any questions. Take care.  Ramakrishna Boyer MD

## 2020-05-23 DIAGNOSIS — T63.441S BEE STING REACTION, ACCIDENTAL OR UNINTENTIONAL, SEQUELA: ICD-10-CM

## 2020-05-23 DIAGNOSIS — E89.0 POSTSURGICAL HYPOTHYROIDISM: ICD-10-CM

## 2020-05-24 RX ORDER — EPINEPHRINE 0.3 MG/.3ML
0.3 INJECTION SUBCUTANEOUS PRN
Qty: 2 EACH | Refills: 1 | Status: SHIPPED | OUTPATIENT
Start: 2020-05-24 | End: 2022-04-06

## 2020-05-27 RX ORDER — LEVOTHYROXINE SODIUM 75 UG/1
75 TABLET ORAL DAILY
Qty: 90 TABLET | Refills: 0 | Status: SHIPPED | OUTPATIENT
Start: 2020-05-27 | End: 2020-08-19

## 2020-05-27 NOTE — TELEPHONE ENCOUNTER
Last Clinic Visit: 4/10/19 with recommended 1 year follow up, no visits scheduled.  Letter sent 1/2/20 to schedule with endo.  90 day yuliana RX provided, routed to clinic to contact to schedule

## 2020-05-28 NOTE — TELEPHONE ENCOUNTER
1st Attempt LVM for Johanne to call back to schedule her yearly follow up appointment with Dr Muir. Patient was due in April for this appointment along with a lab appointment prior to her follow up. I asked Johanne to please call [phone] to schedule.     Randy Hodgson  Procedure , [place]  [facility] Specialty and Adult Endocrinology

## 2020-06-04 NOTE — TELEPHONE ENCOUNTER
2nd Attempt LVM for Johanne to call back to schedule her yearly follow up appointment with Dr Muir. I asked her to please call our office at [phone] to schedule.     3rd Attempt Letter sent.     Randy Hodgson  Procedure , [place]  [facility] Adult Endocrin[facility]

## 2020-06-08 ENCOUNTER — TELEPHONE (OUTPATIENT)
Dept: ENDOCRINOLOGY | Facility: CLINIC | Age: 55
End: 2020-06-08

## 2020-06-08 NOTE — TELEPHONE ENCOUNTER
Will forward to Dr. Muir to determine if labs needed prior to 8/26/2020 appt.    Patient aware provider is out of office.    Radha Monte LPN  Diabetes Clinic Coordinator   Adult Endocrinology and Pediatric Specialty Clinics  [facility]

## 2020-06-08 NOTE — TELEPHONE ENCOUNTER
[facility] Call Center    Phone Message    May a detailed message be left on voicemail: yes     Reason for Call: Other: patient would like lab orders placed for 1 year follow up appt in August. please advise     Action Taken: Message routed to:  Adult Clinics: Endocrinology p [phone]

## 2020-06-11 NOTE — TELEPHONE ENCOUNTER
Lab orders placed.    Patient notified via voicemail. Encouraged to return call with questions.    Radha Monte LPN  Diabetes Clinic Coordinator   Adult Endocrinology and Pediatric Specialty Clinics  [facility]

## 2020-06-18 DIAGNOSIS — R53.82 CHRONIC FATIGUE: ICD-10-CM

## 2020-06-18 DIAGNOSIS — D50.9 IRON DEFICIENCY ANEMIA, UNSPECIFIED IRON DEFICIENCY ANEMIA TYPE: ICD-10-CM

## 2020-06-18 LAB
ERYTHROCYTE [DISTWIDTH] IN BLOOD BY AUTOMATED COUNT: 18.8 % (ref 10–15)
FERRITIN SERPL-MCNC: 14 NG/ML (ref 8–252)
HCT VFR BLD AUTO: 37.8 % (ref 35–47)
HGB BLD-MCNC: 12 G/DL (ref 11.7–15.7)
IRON SATN MFR SERPL: 22 % (ref 15–46)
IRON SERPL-MCNC: 79 UG/DL (ref 35–180)
MCH RBC QN AUTO: 27.3 PG (ref 26.5–33)
MCHC RBC AUTO-ENTMCNC: 31.7 G/DL (ref 31.5–36.5)
MCV RBC AUTO: 86 FL (ref 78–100)
PLATELET # BLD AUTO: 260 10E9/L (ref 150–450)
RBC # BLD AUTO: 4.4 10E12/L (ref 3.8–5.2)
TIBC SERPL-MCNC: 357 UG/DL (ref 240–430)
WBC # BLD AUTO: 6.3 10E9/L (ref 4–11)

## 2020-06-18 PROCEDURE — 82728 ASSAY OF FERRITIN: CPT | Performed by: FAMILY MEDICINE

## 2020-06-18 PROCEDURE — 83540 ASSAY OF IRON: CPT | Performed by: FAMILY MEDICINE

## 2020-06-18 PROCEDURE — 85027 COMPLETE CBC AUTOMATED: CPT | Performed by: FAMILY MEDICINE

## 2020-06-18 PROCEDURE — 83550 IRON BINDING TEST: CPT | Performed by: FAMILY MEDICINE

## 2020-06-18 PROCEDURE — 36415 COLL VENOUS BLD VENIPUNCTURE: CPT | Performed by: FAMILY MEDICINE

## 2020-06-18 NOTE — RESULT ENCOUNTER NOTE
Brody Whiting,  Your lab test showed improved and normal levels of iron stores and hemoglobin.  This is reassuring.  Please continue with your iron supplements as before.   Let me know if you have any questions. Take care.  Ramakrishna Boyer MD

## 2020-06-30 DIAGNOSIS — F51.05 INSOMNIA SECONDARY TO ANXIETY: ICD-10-CM

## 2020-06-30 DIAGNOSIS — F41.9 INSOMNIA SECONDARY TO ANXIETY: ICD-10-CM

## 2020-06-30 RX ORDER — HYDROXYZINE HYDROCHLORIDE 25 MG/1
12.5-25 TABLET, FILM COATED ORAL
Qty: 30 TABLET | Refills: 5 | Status: SHIPPED | OUTPATIENT
Start: 2020-06-30 | End: 2020-07-21

## 2020-07-21 ENCOUNTER — OFFICE VISIT (OUTPATIENT)
Dept: PEDIATRICS | Facility: CLINIC | Age: 55
End: 2020-07-21
Payer: COMMERCIAL

## 2020-07-21 VITALS
HEART RATE: 75 BPM | BODY MASS INDEX: 19.56 KG/M2 | WEIGHT: 110.4 LBS | OXYGEN SATURATION: 97 % | HEIGHT: 63 IN | TEMPERATURE: 99 F | DIASTOLIC BLOOD PRESSURE: 64 MMHG | SYSTOLIC BLOOD PRESSURE: 100 MMHG

## 2020-07-21 DIAGNOSIS — F51.05 INSOMNIA SECONDARY TO ANXIETY: ICD-10-CM

## 2020-07-21 DIAGNOSIS — F32.A ANXIETY AND DEPRESSION: ICD-10-CM

## 2020-07-21 DIAGNOSIS — Z00.00 ROUTINE GENERAL MEDICAL EXAMINATION AT A HEALTH CARE FACILITY: Primary | ICD-10-CM

## 2020-07-21 DIAGNOSIS — R53.82 CHRONIC FATIGUE: ICD-10-CM

## 2020-07-21 DIAGNOSIS — F41.9 ANXIETY AND DEPRESSION: ICD-10-CM

## 2020-07-21 DIAGNOSIS — Z12.39 BREAST CANCER SCREENING: ICD-10-CM

## 2020-07-21 DIAGNOSIS — Z13.820 SCREENING FOR OSTEOPOROSIS: ICD-10-CM

## 2020-07-21 DIAGNOSIS — D50.9 IRON DEFICIENCY ANEMIA, UNSPECIFIED IRON DEFICIENCY ANEMIA TYPE: ICD-10-CM

## 2020-07-21 DIAGNOSIS — R87.810 CERVICAL HIGH RISK HPV (HUMAN PAPILLOMAVIRUS) TEST POSITIVE: ICD-10-CM

## 2020-07-21 DIAGNOSIS — Z13.6 CARDIOVASCULAR SCREENING; LDL GOAL LESS THAN 160: ICD-10-CM

## 2020-07-21 DIAGNOSIS — Z12.11 COLON CANCER SCREENING: ICD-10-CM

## 2020-07-21 DIAGNOSIS — E89.0 POSTSURGICAL HYPOTHYROIDISM: ICD-10-CM

## 2020-07-21 DIAGNOSIS — F41.9 INSOMNIA SECONDARY TO ANXIETY: ICD-10-CM

## 2020-07-21 LAB
BASOPHILS # BLD AUTO: 0.1 10E9/L (ref 0–0.2)
BASOPHILS NFR BLD AUTO: 1.4 %
DIFFERENTIAL METHOD BLD: ABNORMAL
EOSINOPHIL # BLD AUTO: 0.3 10E9/L (ref 0–0.7)
EOSINOPHIL NFR BLD AUTO: 4.4 %
ERYTHROCYTE [DISTWIDTH] IN BLOOD BY AUTOMATED COUNT: 17.1 % (ref 10–15)
FERRITIN SERPL-MCNC: 26 NG/ML (ref 8–252)
HCT VFR BLD AUTO: 38.4 % (ref 35–47)
HGB BLD-MCNC: 12.4 G/DL (ref 11.7–15.7)
IMM GRANULOCYTES # BLD: 0 10E9/L (ref 0–0.4)
IMM GRANULOCYTES NFR BLD: 0.2 %
IRON SATN MFR SERPL: 14 % (ref 15–46)
IRON SERPL-MCNC: 49 UG/DL (ref 35–180)
LYMPHOCYTES # BLD AUTO: 2.3 10E9/L (ref 0.8–5.3)
LYMPHOCYTES NFR BLD AUTO: 36.7 %
MCH RBC QN AUTO: 28.1 PG (ref 26.5–33)
MCHC RBC AUTO-ENTMCNC: 32.3 G/DL (ref 31.5–36.5)
MCV RBC AUTO: 87 FL (ref 78–100)
MONOCYTES # BLD AUTO: 0.6 10E9/L (ref 0–1.3)
MONOCYTES NFR BLD AUTO: 9.7 %
NEUTROPHILS # BLD AUTO: 3 10E9/L (ref 1.6–8.3)
NEUTROPHILS NFR BLD AUTO: 47.6 %
PLATELET # BLD AUTO: 291 10E9/L (ref 150–450)
RBC # BLD AUTO: 4.42 10E12/L (ref 3.8–5.2)
TIBC SERPL-MCNC: 340 UG/DL (ref 240–430)
WBC # BLD AUTO: 6.3 10E9/L (ref 4–11)

## 2020-07-21 PROCEDURE — 99396 PREV VISIT EST AGE 40-64: CPT | Performed by: FAMILY MEDICINE

## 2020-07-21 PROCEDURE — 83550 IRON BINDING TEST: CPT | Performed by: FAMILY MEDICINE

## 2020-07-21 PROCEDURE — 36415 COLL VENOUS BLD VENIPUNCTURE: CPT | Performed by: FAMILY MEDICINE

## 2020-07-21 PROCEDURE — 85025 COMPLETE CBC W/AUTO DIFF WBC: CPT | Performed by: FAMILY MEDICINE

## 2020-07-21 PROCEDURE — 82728 ASSAY OF FERRITIN: CPT | Performed by: FAMILY MEDICINE

## 2020-07-21 PROCEDURE — 83540 ASSAY OF IRON: CPT | Performed by: FAMILY MEDICINE

## 2020-07-21 ASSESSMENT — MIFFLIN-ST. JEOR: SCORE: 1051.96

## 2020-07-21 ASSESSMENT — ANXIETY QUESTIONNAIRES
5. BEING SO RESTLESS THAT IT IS HARD TO SIT STILL: SEVERAL DAYS
1. FEELING NERVOUS, ANXIOUS, OR ON EDGE: MORE THAN HALF THE DAYS
2. NOT BEING ABLE TO STOP OR CONTROL WORRYING: MORE THAN HALF THE DAYS
7. FEELING AFRAID AS IF SOMETHING AWFUL MIGHT HAPPEN: NOT AT ALL
GAD7 TOTAL SCORE: 9
6. BECOMING EASILY ANNOYED OR IRRITABLE: SEVERAL DAYS
3. WORRYING TOO MUCH ABOUT DIFFERENT THINGS: SEVERAL DAYS

## 2020-07-21 ASSESSMENT — PATIENT HEALTH QUESTIONNAIRE - PHQ9
SUM OF ALL RESPONSES TO PHQ QUESTIONS 1-9: 4
5. POOR APPETITE OR OVEREATING: MORE THAN HALF THE DAYS

## 2020-07-21 ASSESSMENT — PAIN SCALES - GENERAL: PAINLEVEL: NO PAIN (0)

## 2020-07-21 NOTE — PROGRESS NOTES
SUBJECTIVE:   CC: Cynthia Marie Barthel is an 54 year old woman who presents for preventive health visit.     Healthy Habits:     Getting at least 3 servings of Calcium per day:  NO    Bi-annual eye exam:  Yes    Dental care twice a year:  Yes    Sleep apnea or symptoms of sleep apnea:  None    Diet:  Vegetarian/vegan    Frequency of exercise:  2-3 days/week    Duration of exercise:  45-60 minutes    Taking medications regularly:  Yes    Medication side effects:  None    PHQ-2 Total Score: 0    Additional concerns today:  Yes      QUESTIONS/ CONCERNS: discuss  supplements she is taking, discuss high risk hpv and next plan        Today's PHQ-2 Score:   PHQ-2 (  [employer]) 2020   Q1: Little interest or pleasure in doing things 1   Q2: Feeling down, depressed or hopeless 1   PHQ-2 Score 2   Q1: Little interest or pleasure in doing things -   Q2: Feeling down, depressed or hopeless -   PHQ-2 Score -       Abuse: Current or Past(Physical, Sexual or Emotional)- No  Do you feel safe in your environment? Yes    Have you ever done Advance Care Planning? (For example, a Health Directive, POLST, or a discussion with a medical provider or your loved ones about your wishes): Yes, patient states has an Advance Care Planning document and will bring a copy to the clinic.    Social History     Tobacco Use     Smoking status: Former Smoker     Types: Cigarettes     Last attempt to quit: 2/10/2014     Years since quittin.4     Smokeless tobacco: Never Used   Substance Use Topics     Alcohol use: No         Alcohol Use 2020   Prescreen: >3 drinks/day or >7 drinks/week? No   Prescreen: >3 drinks/day or >7 drinks/week? -       Reviewed orders with patient.  Reviewed health maintenance and updated orders accordingly - Yes  Lab work is in process  Labs reviewed in EPIC  BP Readings from Last 3 Encounters:   20 (P) 100/64   19 92/61   04/10/19 100/64    Wt Readings from Last 3 Encounters:   20 (P) 50.1 kg  (110 lb 6.4 oz)   19 51.3 kg (113 lb)   04/10/19 51 kg (112 lb 7 oz)                  Patient Active Problem List   Diagnosis     Postsurgical hypothyroidism     Giant papillary conjunctivitis     Cerebral gliosis     Cyst of pineal gland     BPV (benign positional vertigo), bilateral     Anxiety and depression     CARDIOVASCULAR SCREENING; LDL GOAL LESS THAN 160     Cervical high risk HPV (human papillomavirus) test positive     H/O scoliosis     Disorder of right temporomandibular joint     Past Surgical History:   Procedure Laterality Date     BACK SURGERY      shanks rods     COLONOSCOPY WITH CO2 INSUFFLATION N/A 2019    Procedure: COLONOSCOPY WITH CO2 INSUFFLATION;  Surgeon: Carol Toure MD;  Location: [facility]     ENT SURGERY      sinus     HEAD & NECK SURGERY      partial thyroidectomy       Social History     Tobacco Use     Smoking status: Former Smoker     Types: Cigarettes     Last attempt to quit: 2/10/2014     Years since quittin.4     Smokeless tobacco: Never Used   Substance Use Topics     Alcohol use: No     Family History   Problem Relation Age of Onset     Hypertension Mother      Substance Abuse Mother      Depression Father      Substance Abuse Father      Cerebrovascular Disease Maternal Grandfather      Hypertension Maternal Aunt      Glaucoma Maternal Aunt      Macular Degeneration Maternal Aunt      Cataracts Maternal Aunt      Hypertension Maternal Uncle      Hypertension Paternal Aunt      Diabetes Paternal Uncle      Hypertension Paternal Uncle      Cancer No family hx of      Thyroid Disease No family hx of          Current Outpatient Medications   Medication Sig Dispense Refill     B Complex Vitamins (B COMPLEX 1) TABS Take  by mouth daily.       Calcium-Magnesium-Vitamin D (CITRACAL CALCIUM+D PO) Take 1 tablet by mouth daily       EPINEPHrine (ANY BX GENERIC EQUIV) 0.3 MG/0.3ML injection 2-pack Inject 0.3 mLs (0.3 mg) into the muscle as needed for anaphylaxis 2 each  1     fluorouracil (EFUDEX) 5 % cream Use  Efudex twice daily for 2-3 weeks or until the onset of irritation. 40 g 0     levothyroxine (SYNTHROID/LEVOTHROID) 75 MCG tablet Take 1 tablet (75 mcg) by mouth daily 90 tablet 0     Multiple Vitamin CAPS Take  by mouth daily.       tazarotene (TAZORAC) 0.1 % external cream Apply a pea-sized amount nightly to face. Keep away from eyes. 30 g 11     tiZANidine (ZANAFLEX) 4 MG tablet TAKE 1 TABLET (4 MG) BY MOUTH NIGHTLY AS NEEDED FOR MUSCLE SPASMS 30 tablet 3     traZODone (DESYREL) 50 MG tablet Take 0.5-1 tablets (25-50 mg) by mouth nightly as needed for sleep 30 tablet 3     venlafaxine (EFFEXOR-XR) 150 MG 24 hr capsule Take 1 capsule by mouth daily       Glucosamine-Chondroitin (GLUCOSAMINE CHONDR COMPLEX PO) Take by mouth as needed Reported on 5/16/2017       Omega-3 Fatty Acids (FISH OIL PO) Take  by mouth daily.       vitamin E 400 UNIT capsule Take by mouth as needed Reported on 5/16/2017       Allergies   Allergen Reactions     Bees Swelling     Wasps [Hornets] Swelling     Hay Fever & [A.R.M.]      Recent Labs   Lab Test 05/19/20  1259 04/10/19  1523 11/14/18  0847  11/13/17  1518   LDL  --   --  106*  --  110*   HDL  --   --  112  --  138   TRIG  --   --  49  --  40   ALT 34  --  26  --  28   CR 0.66  --  0.79  --  0.78   GFRESTIMATED >90  --  76  --  77   GFRESTBLACK >90  --  >90  --  >90   POTASSIUM 3.6  --  3.6  --  3.8   TSH 1.01 1.12  --    < >  --     < > = values in this interval not displayed.        Mammogram Screening: Patient over age 50, mutual decision to screen reflected in health maintenance.    Pertinent mammograms are reviewed under the imaging tab.  History of abnormal Pap smear: YES - updated in Problem List and Health Maintenance accordingly  PAP / HPV Latest Ref Rng & Units 9/4/2019 11/28/2018 11/13/2017   PAP - NIL NIL NIL   HPV 16 DNA NEG:Negative Negative Negative Negative   HPV 18 DNA NEG:Negative Negative Negative Negative   OTHER HR HPV  NEG:Negative Positive(A) Positive(A) Positive(A)     Reviewed and updated as needed this visit by clinical staff  Tobacco  Allergies  Meds  Med Hx  Surg Hx  Fam Hx  Soc Hx        Reviewed and updated as needed this visit by Provider  Meds          Past Medical History:   Diagnosis Date     Actinic keratosis      Arthritis      Cervical high risk HPV (human papillomavirus) test positive 2017, 19    See problem list      Past Surgical History:   Procedure Laterality Date     BACK SURGERY      shanks rods     COLONOSCOPY WITH CO2 INSUFFLATION N/A 2019    Procedure: COLONOSCOPY WITH CO2 INSUFFLATION;  Surgeon: Carol Toure MD;  Location: [facility]     ENT SURGERY      sinus     HEAD & NECK SURGERY      partial thyroidectomy     OB History    Para Term  AB Living   0 0 0 0 0 0   SAB TAB Ectopic Multiple Live Births   0 0 0 0 0       Review of Systems  CONSTITUTIONAL: NEGATIVE for fever, chills, change in weight  INTEGUMENTARY/SKIN: NEGATIVE for worrisome rashes, moles or lesions  EYES: NEGATIVE for vision changes or irritation  ENT: NEGATIVE for ear, mouth and throat problems  RESP: NEGATIVE for significant cough or SOB  BREAST: NEGATIVE for masses, tenderness or discharge  CV: NEGATIVE for chest pain, palpitations or peripheral edema  GI: NEGATIVE for nausea, abdominal pain, heartburn, or change in bowel habits  : NEGATIVE for unusual urinary or vaginal symptoms. No vaginal bleeding.  MUSCULOSKELETAL: NEGATIVE for significant arthralgias or myalgia  NEURO: NEGATIVE for weakness, dizziness or paresthesias  ENDOCRINE: NEGATIVE for temperature intolerance, skin/hair changes  ENDOCRINE: Hx thyroid disease  HEME/ALLERGY/IMMUNE: NEGATIVE for bleeding problems  HEME/ALLERGY/IMMUNE: anemia  PSYCHIATRIC: NEGATIVE for changes in mood or affect  PSYCHIATRIC: HX anxiety and HX depression      OBJECTIVE:   BP (P) 100/64 (BP Location: Right arm, Patient Position: Sitting, Cuff Size: Adult  "Regular)   Pulse (P) 75   Temp (P) 99  F (37.2  C) (Temporal)   Ht (P) 1.588 m (5' 2.5\")   Wt (P) 50.1 kg (110 lb 6.4 oz)   SpO2 (P) 97%   BMI (P) 19.87 kg/m    Physical Exam  GENERAL APPEARANCE: healthy, alert and no distress  EYES: Eyes grossly normal to inspection, PERRL and conjunctivae and sclerae normal  HENT: ear canals and TM's normal, nose and mouth without ulcers or lesions, oropharynx clear and oral mucous membranes moist  NECK: no adenopathy, no asymmetry, masses, or scars and thyroid normal to palpation  RESP: lungs clear to auscultation - no rales, rhonchi or wheezes  BREAST: normal without masses, tenderness or nipple discharge and no palpable axillary masses or adenopathy  CV: regular rate and rhythm, normal S1 S2, no S3 or S4, no murmur, click or rub, no peripheral edema and peripheral pulses strong  ABDOMEN: soft, nontender, no hepatosplenomegaly, no masses and bowel sounds normal  MS: no musculoskeletal defects are noted and gait is age appropriate without ataxia  SKIN: no suspicious lesions or rashes  NEURO: Normal strength and tone, sensory exam grossly normal, mentation intact and speech normal  PSYCH: mentation appears normal and affect normal/bright    Diagnostic Test Results:  Labs reviewed in Epic  Results for orders placed or performed in visit on 07/21/20 (from the past 24 hour(s))   Iron and iron binding capacity   Result Value Ref Range    Iron 49 35 - 180 ug/dL    Iron Binding Cap 340 240 - 430 ug/dL    Iron Saturation Index 14 (L) 15 - 46 %   CBC with platelets and differential   Result Value Ref Range    WBC 6.3 4.0 - 11.0 10e9/L    RBC Count 4.42 3.8 - 5.2 10e12/L    Hemoglobin 12.4 11.7 - 15.7 g/dL    Hematocrit 38.4 35.0 - 47.0 %    MCV 87 78 - 100 fl    MCH 28.1 26.5 - 33.0 pg    MCHC 32.3 31.5 - 36.5 g/dL    RDW 17.1 (H) 10.0 - 15.0 %    Platelet Count 291 150 - 450 10e9/L    Diff Method Automated Method     % Neutrophils 47.6 %    % Lymphocytes 36.7 %    % Monocytes 9.7 % "    % Eosinophils 4.4 %    % Basophils 1.4 %    % Immature Granulocytes 0.2 %    Absolute Neutrophil 3.0 1.6 - 8.3 10e9/L    Absolute Lymphocytes 2.3 0.8 - 5.3 10e9/L    Absolute Monocytes 0.6 0.0 - 1.3 10e9/L    Absolute Eosinophils 0.3 0.0 - 0.7 10e9/L    Absolute Basophils 0.1 0.0 - 0.2 10e9/L    Abs Immature Granulocytes 0.0 0 - 0.4 10e9/L   Ferritin   Result Value Ref Range    Ferritin 26 8 - 252 ng/mL       ASSESSMENT/PLAN:   1. Routine general medical examination at a health care facility  Discussed on regular exercises, daily calcium intake, healthy eating, self breast exams monthly and routine dental checks    - DX Hip/Pelvis/Spine; Future    2. Iron deficiency anemia, unspecified iron deficiency anemia type  Patient is currently taking iron supplements twice a day  Wants to have the lab recheck today feeling complete resolution of fatigue after starting on iron supplement  - Iron and iron binding capacity  - CBC with platelets and differential  - Ferritin    3. Screening for osteoporosis  Patient wants to have a baseline osteoporosis screening with DEXA   she understands to check with insurance for coverage  - DX Hip/Pelvis/Spine; Future    4. Chronic fatigue  As above in problem #2    5. Postsurgical hypothyroidism  Follows up with endocrinology    6. Anxiety and depression  Sees psychiatry, on Effexor 150 mg daily    7. Insomnia secondary to anxiety  Patient reported tried hydroxyzine but did not like the effect the following day with drowsiness, continues to take trazodone as needed    8. CARDIOVASCULAR SCREENING; LDL GOAL LESS THAN 160  LDL Cholesterol Calculated   Date Value Ref Range Status   11/14/2018 106 (H) <100 mg/dL Final     Comment:     Above desirable:  100-129 mg/dl  Borderline High:  130-159 mg/dL  High:             160-189 mg/dL  Very high:       >189 mg/dl           9. Breast cancer screening  Patient is scheduled for mammogram    10. Cervical high risk HPV (human papillomavirus) test  "positive  She is not due for the repeat Pap for the positive HPV until September, patient will come back for pelvic exam and Pap at that time    11. Colon cancer screening  Reviewed  colonoscopy from 2019  Recheck in 2024      COUNSELING:  Reviewed preventive health counseling, as reflected in patient instructions  Special attention given to:        Regular exercise       Healthy diet/nutrition       Vision screening       Osteoporosis Prevention/Bone Health       Colon cancer screening       (Christine)menopause management       The ASCVD Risk score (Blanca SQUIRES Jr., et al., 2013) failed to calculate for the following reasons:    The valid HDL cholesterol range is 20 to 100 mg/dL       Advance Care Planning    Estimated body mass index is 19.87 kg/m  (pended) as calculated from the following:    Height as of this encounter: (P) 1.588 m (5' 2.5\").    Weight as of this encounter: (P) 50.1 kg (110 lb 6.4 oz).         reports that she quit smoking about 6 years ago. Her smoking use included cigarettes. She has never used smokeless tobacco.      Counseling Resources:  ATP IV Guidelines  Pooled Cohorts Equation Calculator  Breast Cancer Risk Calculator  FRAX Risk Assessment  ICSI Preventive Guidelines  Dietary Guidelines for Americans, 2010  USDA's MyPlate  ASA Prophylaxis  Lung CA Screening    Ramakrishna Boyer MD  [facility]  Chart documentation done in part with Dragon Voice recognition Software. Although reviewed after completion, some word and grammatical error may remain.    "

## 2020-07-21 NOTE — PATIENT INSTRUCTIONS
Get the labs today  Schedule for pelvic/pap in 2 months  Schedule for DEXA after checking with insurance      Preventive Health Recommendations  Female Ages 50 - 64    Yearly exam: See your health care provider every year in order to  o Review health changes.   o Discuss preventive care.    o Review your medicines if your doctor has prescribed any.      Get a Pap test every three years (unless you have an abnormal result and your provider advises testing more often).    If you get Pap tests with HPV test, you only need to test every 5 years, unless you have an abnormal result.     You do not need a Pap test if your uterus was removed (hysterectomy) and you have not had cancer.    You should be tested each year for STDs (sexually transmitted diseases) if you're at risk.     Have a mammogram every 1 to 2 years.    Have a colonoscopy at age 50, or have a yearly FIT test (stool test). These exams screen for colon cancer.      Have a cholesterol test every 5 years, or more often if advised.    Have a diabetes test (fasting glucose) every three years. If you are at risk for diabetes, you should have this test more often.     If you are at risk for osteoporosis (brittle bone disease), think about having a bone density scan (DEXA).    Shots: Get a flu shot each year. Get a tetanus shot every 10 years.    Nutrition:     Eat at least 5 servings of fruits and vegetables each day.    Eat whole-grain bread, whole-wheat pasta and brown rice instead of white grains and rice.    Get adequate Calcium and Vitamin D.     Lifestyle    Exercise at least 150 minutes a week (30 minutes a day, 5 days a week). This will help you control your weight and prevent disease.    Limit alcohol to one drink per day.    No smoking.     Wear sunscreen to prevent skin cancer.     See your dentist every six months for an exam and cleaning.    See your eye doctor every 1 to 2 years.

## 2020-07-22 ASSESSMENT — ANXIETY QUESTIONNAIRES: GAD7 TOTAL SCORE: 9

## 2020-07-22 NOTE — RESULT ENCOUNTER NOTE
Brody Whiting,  Your hemoglobin is normal and iron levels have improved.  Please continue with your current iron supplements.  Let me know if you have any questions. Take care.  Ramakrishna Boyer MD

## 2020-08-06 ENCOUNTER — ANCILLARY PROCEDURE (OUTPATIENT)
Dept: MAMMOGRAPHY | Facility: CLINIC | Age: 55
End: 2020-08-06
Attending: FAMILY MEDICINE
Payer: COMMERCIAL

## 2020-08-06 DIAGNOSIS — Z12.31 VISIT FOR SCREENING MAMMOGRAM: ICD-10-CM

## 2020-08-06 PROCEDURE — 77067 SCR MAMMO BI INCL CAD: CPT

## 2020-08-16 DIAGNOSIS — E89.0 POSTSURGICAL HYPOTHYROIDISM: ICD-10-CM

## 2020-08-19 RX ORDER — LEVOTHYROXINE SODIUM 75 UG/1
75 TABLET ORAL DAILY
Qty: 30 TABLET | Refills: 0 | Status: SHIPPED | OUTPATIENT
Start: 2020-08-19 | End: 2020-09-03

## 2020-08-24 ENCOUNTER — MYC MEDICAL ADVICE (OUTPATIENT)
Dept: PEDIATRICS | Facility: CLINIC | Age: 55
End: 2020-08-24

## 2020-09-03 DIAGNOSIS — E89.0 POSTSURGICAL HYPOTHYROIDISM: ICD-10-CM

## 2020-09-08 RX ORDER — LEVOTHYROXINE SODIUM 75 UG/1
75 TABLET ORAL DAILY
Qty: 90 TABLET | Refills: 3 | Status: SHIPPED | OUTPATIENT
Start: 2020-09-08 | End: 2021-09-14

## 2020-09-08 NOTE — TELEPHONE ENCOUNTER
levothyroxine (SYNTHROID/LEVOTHROID) 75 MCG tablet      Last Written Prescription Date:  8/19/20  Last Fill Quantity: 30,   # refills: 0  Last Office Visit : 4/10/19  Future Office visit:  1/5/21    Routing refill request to provider for quantity because:  Overdue office visit. Appointment scheduled for 1/5/21.     Lab Test 05/19/20  1259   TSH 1.01

## 2020-09-16 DIAGNOSIS — L70.0 ACNE VULGARIS: ICD-10-CM

## 2020-09-18 RX ORDER — TAZAROTENE 1 MG/G
CREAM TOPICAL
Qty: 30 G | Refills: 3 | Status: SHIPPED | OUTPATIENT
Start: 2020-09-18

## 2020-09-23 ENCOUNTER — TELEPHONE (OUTPATIENT)
Dept: PEDIATRICS | Facility: CLINIC | Age: 55
End: 2020-09-23

## 2020-09-23 NOTE — TELEPHONE ENCOUNTER
Reason for call:  Other   Patient called regarding (reason for call): call back  Additional comments: Having low iron requesting testing    Phone number to reach patient:  Home number on file [phone] (home)    Best Time:  Anytime    Can we leave a detailed message on this number?  YES    Travel screening: Not Applicable

## 2020-09-24 NOTE — TELEPHONE ENCOUNTER
Called patient, left message with RN phone number to call back.       Radha Padilla RN, [facility]

## 2020-09-25 NOTE — TELEPHONE ENCOUNTER
Called patient, left message with RN phone number to call back.       Radha Padilla RN, [facility]

## 2020-10-08 ENCOUNTER — TRANSFERRED RECORDS (OUTPATIENT)
Dept: HEALTH INFORMATION MANAGEMENT | Facility: CLINIC | Age: 55
End: 2020-10-08

## 2020-11-24 ENCOUNTER — OFFICE VISIT (OUTPATIENT)
Dept: PEDIATRICS | Facility: CLINIC | Age: 55
End: 2020-11-24
Payer: COMMERCIAL

## 2020-11-24 VITALS
SYSTOLIC BLOOD PRESSURE: 111 MMHG | WEIGHT: 118.4 LBS | HEIGHT: 63 IN | BODY MASS INDEX: 20.98 KG/M2 | HEART RATE: 71 BPM | OXYGEN SATURATION: 98 % | TEMPERATURE: 98.3 F | DIASTOLIC BLOOD PRESSURE: 74 MMHG

## 2020-11-24 DIAGNOSIS — R87.810 CERVICAL HIGH RISK HPV (HUMAN PAPILLOMAVIRUS) TEST POSITIVE: ICD-10-CM

## 2020-11-24 DIAGNOSIS — N95.1 MENOPAUSAL SYNDROME (HOT FLASHES): ICD-10-CM

## 2020-11-24 DIAGNOSIS — G47.09 OTHER INSOMNIA: Primary | ICD-10-CM

## 2020-11-24 DIAGNOSIS — Z12.4 CERVICAL CANCER SCREENING: ICD-10-CM

## 2020-11-24 PROCEDURE — 99213 OFFICE O/P EST LOW 20 MIN: CPT | Performed by: FAMILY MEDICINE

## 2020-11-24 PROCEDURE — 87624 HPV HI-RISK TYP POOLED RSLT: CPT | Performed by: FAMILY MEDICINE

## 2020-11-24 RX ORDER — GABAPENTIN 100 MG/1
100 CAPSULE ORAL AT BEDTIME
Qty: 90 CAPSULE | Refills: 3 | Status: SHIPPED | OUTPATIENT
Start: 2020-11-24 | End: 2021-06-01

## 2020-11-24 ASSESSMENT — MIFFLIN-ST. JEOR: SCORE: 1083.25

## 2020-11-24 NOTE — PROGRESS NOTES
"   SUBJECTIVE:   CC: Cynthia Marie Barthel is an 55 year old woman who presents for preventive health visit.     {Split Bill scripting  The purpose of this visit is to discuss your medical history and prevent health problems before you are sick. You may be responsible for a co-pay, coinsurance, or deductible if your visit today includes services such as checking on a sore throat, having an x-ray or lab test, or treating and evaluating a new or existing condition :278245}  Patient has been advised of split billing requirements and indicates understanding: {Yes and No:169478}  Healthy Habits:    Do you get at least three servings of calcium containing foods daily (dairy, green leafy vegetables, etc.)? { :315618::\"yes\"}    Amount of exercise or daily activities, outside of work: { :376306}    Problems taking medications regularly { :460897::\"No\"}    Medication side effects: { :508372::\"No\"}    Have you had an eye exam in the past two years? { :054627}    Do you see a dentist twice per year? { :160612}    Do you have sleep apnea, excessive snoring or daytime drowsiness?{ :604100}  {Outside tests to abstract? :977206}    {additional problems to add (Optional):972315}    Today's PHQ-2 Score:   PHQ-2 (  [employer]) 2020   Q1: Little interest or pleasure in doing things 1 0   Q2: Feeling down, depressed or hopeless 1 0   PHQ-2 Score 2 0   Q1: Little interest or pleasure in doing things - Not at all   Q2: Feeling down, depressed or hopeless - Not at all   PHQ-2 Score - 0     {PHQ-2 LOOK IN ASSESSMENTS (Optional) :102926}  Abuse: Current or Past(Physical, Sexual or Emotional)- {YES/NO/NA:208153}  Do you feel safe in your environment? {YES/NO/NA:172786}        Social History     Tobacco Use     Smoking status: Former Smoker     Types: Cigarettes     Quit date: 2/10/2014     Years since quittin.7     Smokeless tobacco: Never Used   Substance Use Topics     Alcohol use: No     If you drink alcohol do you " "typically have >3 drinks per day or >7 drinks per week? {ETOH :688095}                     Reviewed orders with patient.  Reviewed health maintenance and updated orders accordingly - {Yes/No:384694::\"Yes\"}  {Chronicprobdata (Optional):147374}    {Mammo Decision Support (Optional):784555}    Pertinent mammograms are reviewed under the imaging tab.  History of abnormal Pap smear: {PAP HX:178496}  PAP / HPV Latest Ref Rng & Units 9/4/2019 11/28/2018 11/13/2017   PAP - NIL NIL NIL   HPV 16 DNA NEG:Negative Negative Negative Negative   HPV 18 DNA NEG:Negative Negative Negative Negative   OTHER HR HPV NEG:Negative Positive(A) Positive(A) Positive(A)     Reviewed and updated as needed this visit by clinical staff   Allergies               Reviewed and updated as needed this visit by Provider                {HISTORY OPTIONS (Optional):543113}    ROS:  { :356589}    OBJECTIVE:   There were no vitals taken for this visit.  EXAM:  {Exam Choices:015490}    {Diagnostic Test Results (Optional):563199::\"Diagnostic Test Results:\",\"Labs reviewed in Epic\"}    ASSESSMENT/PLAN:   {Diag Picklist:856960}    Patient has been advised of split billing requirements and indicates understanding: {YES / NO:028273::\"Yes\"}  COUNSELING:   {FEMALE COUNSELING MESSAGES:644769::\"Reviewed preventive health counseling, as reflected in patient instructions\"}    Estimated body mass index is 19.87 kg/m  as calculated from the following:    Height as of 7/21/20: 1.588 m (5' 2.5\").    Weight as of 7/21/20: 50.1 kg (110 lb 6.4 oz).    {Weight Management Plan (ACO) Complete if BMI is abnormal-  Ages 18-64  BMI >24.9.  Age 65+ with BMI <23 or >30 (Optional):968788}    She reports that she quit smoking about 6 years ago. Her smoking use included cigarettes. She has never used smokeless tobacco.      Counseling Resources:  ATP IV Guidelines  Pooled Cohorts Equation Calculator  Breast Cancer Risk Calculator  BRCA-Related Cancer Risk Assessment: FHS-7 Tool  FRAX " Risk Assessment  ICSI Preventive Guidelines  Dietary Guidelines for Americans, 2010  USDA's MyPlate  ASA Prophylaxis  Lung CA Screening    Ramakrishna Boyer MD  [facility]

## 2020-11-24 NOTE — PATIENT INSTRUCTIONS
Stop VISTARIL and start on GABAPENTIN 100 mg at bedtime as needed for sleep    Preventive Health Recommendations  Female Ages 50 - 64    Yearly exam: See your health care provider every year in order to  o Review health changes.   o Discuss preventive care.    o Review your medicines if your doctor has prescribed any.      Get a Pap test every three years (unless you have an abnormal result and your provider advises testing more often).    If you get Pap tests with HPV test, you only need to test every 5 years, unless you have an abnormal result.     You do not need a Pap test if your uterus was removed (hysterectomy) and you have not had cancer.    You should be tested each year for STDs (sexually transmitted diseases) if you're at risk.     Have a mammogram every 1 to 2 years.    Have a colonoscopy at age 50, or have a yearly FIT test (stool test). These exams screen for colon cancer.      Have a cholesterol test every 5 years, or more often if advised.    Have a diabetes test (fasting glucose) every three years. If you are at risk for diabetes, you should have this test more often.     If you are at risk for osteoporosis (brittle bone disease), think about having a bone density scan (DEXA).    Shots: Get a flu shot each year. Get a tetanus shot every 10 years.    Nutrition:     Eat at least 5 servings of fruits and vegetables each day.    Eat whole-grain bread, whole-wheat pasta and brown rice instead of white grains and rice.    Get adequate Calcium and Vitamin D.     Lifestyle    Exercise at least 150 minutes a week (30 minutes a day, 5 days a week). This will help you control your weight and prevent disease.    Limit alcohol to one drink per day.    No smoking.     Wear sunscreen to prevent skin cancer.     See your dentist every six months for an exam and cleaning.    See your eye doctor every 1 to 2 years.

## 2020-11-27 LAB
COPATH REPORT: NORMAL
PAP: NORMAL

## 2020-12-01 ENCOUNTER — PATIENT OUTREACH (OUTPATIENT)
Dept: PEDIATRICS | Facility: CLINIC | Age: 55
End: 2020-12-01

## 2020-12-01 NOTE — TELEPHONE ENCOUNTER
2006, 2007,2008, 2010 all NIL paps in Care Everywhere.  11/13/17 NIL pap, + HR HPV (not 16 or 18). Plan: cotest in 1 yr.  11/28/18 NIL pap, + HR HPV (not 16 or 18). Plan: colp  2/25/19 [place] [facility]: Endocervical mucosa with microglandular hyperplasia. Plan: cotest in 6 mo.   9/4/19 [facility]- Negative for dysplasia.  NIL pap, + HR HPV (not 16 or 18). Plan cotest in 1 year.  8/24/20 Reminder letter/My Chart sent--Read  9/23/20 Lost to follow-up for pap tracking  11/24/20 NIL pap, + HR HPV (not 16 or 18). Plan: cotest in 1 yr, due 11/24/21 12/1/20 left msg and sent [employer] result note.

## 2021-05-19 ENCOUNTER — TELEPHONE (OUTPATIENT)
Dept: FAMILY MEDICINE | Facility: CLINIC | Age: 56
End: 2021-05-19

## 2021-05-19 DIAGNOSIS — D50.9 IRON DEFICIENCY ANEMIA, UNSPECIFIED IRON DEFICIENCY ANEMIA TYPE: Primary | ICD-10-CM

## 2021-05-19 NOTE — TELEPHONE ENCOUNTER
Reason for Call: Request for an order or referral:    Order or referral being requested: labs iron    Date needed: as soon as possible    Has the patient been seen by the PCP for this problem? YES    Additional comments: Patient was diagnosed with anemia and wants to recheck her levels    Phone number Patient can be reached at:  Home number on file [phone] (home)    Best Time:  any    Can we leave a detailed message on this number?  YES    Call taken on 5/19/2021 at 2:47 PM by Darren Gonsalez

## 2021-05-20 DIAGNOSIS — D50.9 IRON DEFICIENCY ANEMIA, UNSPECIFIED IRON DEFICIENCY ANEMIA TYPE: ICD-10-CM

## 2021-05-20 LAB
ERYTHROCYTE [DISTWIDTH] IN BLOOD BY AUTOMATED COUNT: 12.7 % (ref 10–15)
FERRITIN SERPL-MCNC: 43 NG/ML (ref 8–252)
HCT VFR BLD AUTO: 39.9 % (ref 35–47)
HGB BLD-MCNC: 13.2 G/DL (ref 11.7–15.7)
IRON SATN MFR SERPL: 13 % (ref 15–46)
IRON SERPL-MCNC: 42 UG/DL (ref 35–180)
MCH RBC QN AUTO: 30.3 PG (ref 26.5–33)
MCHC RBC AUTO-ENTMCNC: 33.1 G/DL (ref 31.5–36.5)
MCV RBC AUTO: 92 FL (ref 78–100)
PLATELET # BLD AUTO: 304 10E9/L (ref 150–450)
RBC # BLD AUTO: 4.36 10E12/L (ref 3.8–5.2)
TIBC SERPL-MCNC: 330 UG/DL (ref 240–430)
WBC # BLD AUTO: 8.2 10E9/L (ref 4–11)

## 2021-05-20 PROCEDURE — 83540 ASSAY OF IRON: CPT | Performed by: FAMILY MEDICINE

## 2021-05-20 PROCEDURE — 36415 COLL VENOUS BLD VENIPUNCTURE: CPT | Performed by: FAMILY MEDICINE

## 2021-05-20 PROCEDURE — 85027 COMPLETE CBC AUTOMATED: CPT | Performed by: FAMILY MEDICINE

## 2021-05-20 PROCEDURE — 83550 IRON BINDING TEST: CPT | Performed by: FAMILY MEDICINE

## 2021-05-20 PROCEDURE — 82728 ASSAY OF FERRITIN: CPT | Performed by: FAMILY MEDICINE

## 2021-05-20 NOTE — RESULT ENCOUNTER NOTE
Brody Whiting,   your hemoglobin and iron levels are in normal range.   Let me know if you have any questions. Take care.  Ramakrishna Boyer MD

## 2021-05-21 ENCOUNTER — TRANSFERRED RECORDS (OUTPATIENT)
Dept: HEALTH INFORMATION MANAGEMENT | Facility: CLINIC | Age: 56
End: 2021-05-21

## 2021-08-28 ENCOUNTER — HEALTH MAINTENANCE LETTER (OUTPATIENT)
Age: 56
End: 2021-08-28

## 2021-10-23 ENCOUNTER — HEALTH MAINTENANCE LETTER (OUTPATIENT)
Age: 56
End: 2021-10-23

## 2021-11-09 ENCOUNTER — PATIENT OUTREACH (OUTPATIENT)
Dept: FAMILY MEDICINE | Facility: CLINIC | Age: 56
End: 2021-11-09
Payer: COMMERCIAL

## 2021-12-07 DIAGNOSIS — E89.0 POSTSURGICAL HYPOTHYROIDISM: ICD-10-CM

## 2021-12-08 RX ORDER — LEVOTHYROXINE SODIUM 75 UG/1
75 TABLET ORAL DAILY
Qty: 60 TABLET | Refills: 0 | Status: SHIPPED | OUTPATIENT
Start: 2021-12-08 | End: 2022-01-26

## 2021-12-08 NOTE — TELEPHONE ENCOUNTER
LEVOTHYROXINE 75 MCG TABLET      Last Written Prescription Date:  9-14-21  Last Fill Quantity:90 ,   # refills: 0  Last Office Visit : 4-10-19  Future Office visit:  1-26-22    Routing refill request to provider for review/approval because:  Past due appt, multiple  yuliana rf  Multiple cancel appt.  Authorize RF

## 2021-12-22 NOTE — PROGRESS NOTES
"Subjective     Cynthia Marie Barthel is a 55 year old female who presents to clinic today for the following health issues:    SOHAM Whiting is here for follow-up on her abnormal Pap, she is due for the repeat Pap today  She is also here to discuss about possible hormone replacement therapy for her ongoing uncontrolled menopausal symptoms including hot flashes and inadequate and disturbed sleep  She is currently taking Effexor for her chronic anxiety and depression.  She has tried hydroxyzine and trazodone in the past that help with sleep somewhat but made her feel sleepy and groggy the next day  She is not sexually active currently.  Has no history of recurrent UTI  Denies abnormal vaginal discharge, bleeding.  Past medical history significant for postsurgical hypothyroidism, anxiety, depression          Review of Systems   CONSTITUTIONAL: NEGATIVE for fever, chills, change in weight  RESP: NEGATIVE for significant cough or SOB  CV: NEGATIVE for chest pain, palpitations or peripheral edema  : as above  ENDOCRINE: History of hypothyroidism  PSYCHIATRIC: History of anxiety and depression      Objective    /74   Pulse 71   Temp 98.3  F (36.8  C) (Temporal)   Ht 1.588 m (5' 2.5\")   Wt 53.7 kg (118 lb 6.4 oz)   SpO2 98%   BMI 21.31 kg/m    Body mass index is 21.31 kg/m .  Physical Exam   GENERAL: healthy, alert and no distress   (female): normal female external genitalia, normal urethral meatus , vaginal mucosa pink, moist, well rugated, vaginal mucosal atrophy and normal cervix, adnexae, and uterus without masses.  PSYCH: mentation appears normal, affect normal/bright            Assessment & Plan     Other insomnia  Recommended to give a trial of gabapentin 100 mg daily at bedtime to help with insomnia and hot flashes  [facility] 2 to 3 weeks after starting on medication  Patient verbalised understanding and is agreeable to the plan.    - gabapentin " (NEURONTIN) 100 MG capsule; Take 1 capsule (100 mg) by mouth At Bedtime    Menopausal syndrome (hot flashes)  as above    - gabapentin (NEURONTIN) 100 MG capsule; Take 1 capsule (100 mg) by mouth At Bedtime    Cervical high risk HPV (human papillomavirus) test positive  Patient was seen by  in 2019, had a colposcopy done for evaluating positive HPV with pathology showing no concern for dysplasia or malignancy   we will recheck Pap this year    - Pap imaged thin layer diagnostic with HPV (select HPV order below)    Cervical cancer screening    - Pap imaged thin layer diagnostic with HPV (select HPV order below)      as above        No follow-ups on file.    Ramakrishna Boyer MD  [facility]  Chart documentation done in part with Dragon Voice recognition Software. Although reviewed after completion, some word and grammatical error may remain.       denies use

## 2022-01-01 DIAGNOSIS — G47.09 OTHER INSOMNIA: ICD-10-CM

## 2022-01-01 DIAGNOSIS — N95.1 MENOPAUSAL SYNDROME (HOT FLASHES): ICD-10-CM

## 2022-01-03 RX ORDER — GABAPENTIN 100 MG/1
200 CAPSULE ORAL AT BEDTIME
Qty: 60 CAPSULE | Refills: 0 | Status: SHIPPED | OUTPATIENT
Start: 2022-01-03 | End: 2022-02-02

## 2022-01-03 NOTE — TELEPHONE ENCOUNTER
30-day supply sent  Please schedule for virtual visit for follow-up on medications  Last visit was in November 2020  Overdue for med follow-up

## 2022-01-04 DIAGNOSIS — E89.0 POSTSURGICAL HYPOTHYROIDISM: ICD-10-CM

## 2022-01-04 NOTE — TELEPHONE ENCOUNTER
Talked with patient and scheduled her for virtual visit with Dr. Boyer on 02/02/022 for medication follow up.

## 2022-01-05 RX ORDER — LEVOTHYROXINE SODIUM 75 UG/1
75 TABLET ORAL DAILY
Qty: 90 TABLET | Refills: 0 | OUTPATIENT
Start: 2022-01-05

## 2022-01-10 PROBLEM — R87.810 CERVICAL HIGH RISK HPV (HUMAN PAPILLOMAVIRUS) TEST POSITIVE: Status: ACTIVE | Noted: 2017-11-13

## 2022-01-26 ENCOUNTER — LAB (OUTPATIENT)
Dept: LAB | Facility: CLINIC | Age: 57
End: 2022-01-26

## 2022-01-26 ENCOUNTER — OFFICE VISIT (OUTPATIENT)
Dept: ENDOCRINOLOGY | Facility: CLINIC | Age: 57
End: 2022-01-26
Payer: COMMERCIAL

## 2022-01-26 VITALS
DIASTOLIC BLOOD PRESSURE: 62 MMHG | SYSTOLIC BLOOD PRESSURE: 103 MMHG | WEIGHT: 113.5 LBS | HEART RATE: 90 BPM | BODY MASS INDEX: 20.43 KG/M2 | OXYGEN SATURATION: 97 %

## 2022-01-26 DIAGNOSIS — E89.0 POSTSURGICAL HYPOTHYROIDISM: ICD-10-CM

## 2022-01-26 DIAGNOSIS — E04.1 THYROID NODULE: Primary | ICD-10-CM

## 2022-01-26 LAB
T4 FREE SERPL-MCNC: 0.98 NG/DL (ref 0.76–1.46)
TSH SERPL DL<=0.005 MIU/L-ACNC: 1.94 MU/L (ref 0.4–4)

## 2022-01-26 PROCEDURE — 84439 ASSAY OF FREE THYROXINE: CPT

## 2022-01-26 PROCEDURE — 99214 OFFICE O/P EST MOD 30 MIN: CPT | Performed by: INTERNAL MEDICINE

## 2022-01-26 PROCEDURE — 36415 COLL VENOUS BLD VENIPUNCTURE: CPT

## 2022-01-26 PROCEDURE — 84443 ASSAY THYROID STIM HORMONE: CPT

## 2022-01-26 RX ORDER — LEVOTHYROXINE SODIUM 75 UG/1
75 TABLET ORAL DAILY
Qty: 90 TABLET | Refills: 3 | Status: SHIPPED | OUTPATIENT
Start: 2022-01-26 | End: 2023-03-27

## 2022-01-26 NOTE — NURSING NOTE
Cynthia M Barthel's goals for this visit include:   Chief Complaint   Patient presents with     Thyroid Disease     She requests these members of her care team be copied on today's visit information: Yes    PCP: Ramakrishna Boyer    Referring Provider:  Ramakrishna Boyer MD  [address] [place] [address]    /62 (BP Location: Left arm, Patient Position: Sitting, Cuff Size: Adult Regular)   Pulse 90   Wt 51.5 kg (113 lb 8 oz)   SpO2 97%   BMI 20.43 kg/m      Do you need any medication refills at today's visit? Yes

## 2022-01-26 NOTE — PROGRESS NOTES
The patient is seen in f/up for thyroid nodules and hypothyroidism.    Today she feels good and she denies any specific complaints.  The hot flashes, which she has been experiencing since 2017, have improved with treatment with gabapentin.    Current levothyroxine replacement dose is 75  g daily.     Johanne first discovered a lump on the left side of the neck in the year 2000. Thyroid ultrasound revealed a dominant left thyroid nodule and a few small thyroid nodules present on the right thyroid lobe. The result of the fine needle aspiration of the left nodule was inconclusive. The patient underwent left hemithyroidectomy in November 2000.  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 There was no evidence of malignancy. A single parathyroid gland was removed. After surgery, she was started on levothyroxine. Subsequently, she had f/up thyroid ultrasounds done for a small right nodule in October 2012, December 2012, October 2013.     Past Medical History   Scoliosis - at birth   Fusion of the spine surgery   3 sinus surgeries   Allergies   Thyroid nodules   L hemithyroidectomy   Hypothyroidism post thyroidectomy   Mild sleep apnea - dx 2015 not using the CPAP machine   IUD for 8 years   Depression since 2 yo, treated since 2001  Insomnia  TMJ    Current Medications    Current Outpatient Medications:      B Complex Vitamins (B COMPLEX 1) TABS, Take  by mouth daily., Disp: , Rfl:      Calcium-Magnesium-Vitamin D (CITRACAL CALCIUM+D PO), Take 1 tablet by mouth daily, Disp: , Rfl:      EPINEPHrine (ANY BX GENERIC EQUIV) 0.3 MG/0.3ML injection 2-pack, Inject 0.3 mLs (0.3 mg) into the muscle as needed for anaphylaxis, Disp: 2 each, Rfl: 1     fluorouracil (EFUDEX) 5 % cream, Use  Efudex twice daily for 2-3 weeks or until the onset of irritation., Disp: 40 g, Rfl: 0     gabapentin (NEURONTIN) 100 MG capsule, TAKE 2 CAPSULES (200 MG) BY MOUTH AT BEDTIME, Disp: 60 capsule, Rfl:  0     levothyroxine (SYNTHROID/LEVOTHROID) 75 MCG tablet, Take 1 tablet (75 mcg) by mouth daily Please keep 1-26-22 clinic appt for refills., Disp: 60 tablet, Rfl: 0     Multiple Vitamin CAPS, Take  by mouth daily., Disp: , Rfl:      tazarotene (TAZORAC) 0.1 % external cream, Apply a pea-sized amount nightly to face. Keep away from eyes., Disp: 30 g, Rfl: 3     tiZANidine (ZANAFLEX) 4 MG tablet, TAKE 1 TABLET (4 MG) BY MOUTH NIGHTLY AS NEEDED FOR MUSCLE SPASMS, Disp: 30 tablet, Rfl: 3     traZODone (DESYREL) 50 MG tablet, Take 0.5-1 tablets (25-50 mg) by mouth nightly as needed for sleep, Disp: 30 tablet, Rfl: 3     venlafaxine (EFFEXOR-XR) 150 MG 24 hr capsule, Take 1 capsule by mouth daily, Disp: , Rfl:     Family History  Father, grandfather, uncles had depression. No thyroid. No diabetes. Great uncles and aunts had colon cancer.  Maternal aunts - osteoporosis.     Social History  . No children. Quit smoking in 2016. Quit drinking alcohol.. Occupation: . Takes MVI, B complex, Mg.     Review of Systems   Systemic:              Weight stable, no sign fatigue   Eye:                      No eye symptoms   Krystle-Laryngeal:     No krystle-laryngeal symptoms, no dysphagia, no hoarseness, no cough     Breast:                  No breast symptoms  Cardiovascular:    No cardiovascular symptoms, no CP or palpitations   Pulmonary:           No pulmonary symptoms, no SOB or cough    Gastrointestinal:   No diarrhea or constipation   Genitourinary:       no increased thirst, no increased urination   Endocrine:            As above  Neurological:       Rare headaches in am, no tremor, numbness in her back longstaning, no dizziness   Musculoskeletal:  joint pain - hips, knees, neck - with morning stiffness    Skin:                     Mild dry skin, no hair falling out   Psychological:      Depression is controlled.     Vital Signs     Previous Weights:   Wt Readings from Last 10 Encounters:   01/26/22 51.5 kg (113 lb 8  oz)   11/24/20 53.7 kg (118 lb 6.4 oz)   07/21/20 50.1 kg (110 lb 6.4 oz)   09/04/19 51.3 kg (113 lb)   04/10/19 51 kg (112 lb 7 oz)   04/01/19 48.5 kg (107 lb)   02/25/19 50 kg (110 lb 3.2 oz)   11/28/18 49.2 kg (108 lb 8 oz)   11/14/18 49.2 kg (108 lb 8 oz)   02/06/18 52.6 kg (116 lb)       /62 (BP Location: Left arm, Patient Position: Sitting, Cuff Size: Adult Regular)   Pulse 90   Wt 51.5 kg (113 lb 8 oz)   SpO2 97%   BMI 20.43 kg/m        Physical Exam  General Appearance: scoliosis noted;  well nourished and in no distress     Eyes:  conjutivae and extra-ocular motions are normal.                                    pupils round and reactive to light, no lid lag, no stare    HEENT:   oropharynx clear and moist, no JVD, no bruits      unable to palpate the thyroid gland   Cardiovascular:  regular rhythm, no murmurs, distal pulse palpable, no edema  Respiratory:        chest clear, no rales, no rhonchi   Musculoskeletal:  normal tone and strength  Psychological:          affect normal, judgment normal  Skin:  warm, no lesions  Neurological:  reflexes normal and symmetric, no resting tremor.     Lab Results  Negative thyroglobulin antibody and a thyroglobulin level of 2.2 in November 2009.  9/27/13  FSH 5  Estradiol 68 (Postmenopausal range less than 54.7).    TSH   Date Value Ref Range Status   01/26/2022 1.94 0.40 - 4.00 mU/L Final   05/19/2020 1.01 0.40 - 4.00 mU/L Final     T4 Free   Date Value Ref Range Status   04/10/2019 1.04 0.76 - 1.46 ng/dL Final     Free T4   Date Value Ref Range Status   01/26/2022 0.98 0.76 - 1.46 ng/dL Final       Assessment/Plan    1. Hypothyroidism secondary to nahomy-thyroidectomy  The patient is, clinically and biochemically, euthyroid.  I recommended to continue to take the same dose of levothyroxine.    2. Right subcentimeter thyroid nodule - with no suspicious features on the prior thyroid USs. We'll continue to monitor clinically.    Orders Placed This Encounter    Procedures     TSH with free T4 reflex     T4 free

## 2022-02-02 ENCOUNTER — VIRTUAL VISIT (OUTPATIENT)
Dept: FAMILY MEDICINE | Facility: CLINIC | Age: 57
End: 2022-02-02
Payer: COMMERCIAL

## 2022-02-02 DIAGNOSIS — N95.1 MENOPAUSAL SYNDROME (HOT FLASHES): Primary | ICD-10-CM

## 2022-02-02 DIAGNOSIS — Z13.220 SCREENING FOR HYPERLIPIDEMIA: ICD-10-CM

## 2022-02-02 DIAGNOSIS — G47.09 OTHER INSOMNIA: ICD-10-CM

## 2022-02-02 DIAGNOSIS — Z13.0 SCREENING FOR DEFICIENCY ANEMIA: ICD-10-CM

## 2022-02-02 DIAGNOSIS — Z87.891 FORMER SMOKER: ICD-10-CM

## 2022-02-02 DIAGNOSIS — Z13.1 SCREENING FOR DIABETES MELLITUS (DM): ICD-10-CM

## 2022-02-02 DIAGNOSIS — Z12.2 SCREENING FOR LUNG CANCER: ICD-10-CM

## 2022-02-02 PROCEDURE — 99213 OFFICE O/P EST LOW 20 MIN: CPT | Mod: GT | Performed by: FAMILY MEDICINE

## 2022-02-02 RX ORDER — GABAPENTIN 100 MG/1
200 CAPSULE ORAL AT BEDTIME
Qty: 180 CAPSULE | Refills: 1 | Status: SHIPPED | OUTPATIENT
Start: 2022-02-02 | End: 2022-04-06

## 2022-02-02 NOTE — PROGRESS NOTES
Negar is a 56 year old who is being evaluated via a billable video visit.      How would you like to obtain your AVS? MyChart  If the video visit is dropped, the invitation should be resent by: Text to cell phone: See epic  Will anyone else be joining your video visit? No    Video Start Time: 2:16 PM    Assessment & Plan     Menopausal syndrome (hot flashes)  Stable and well controlled, continue current dose of gabapentin 20 g daily at bedtime  - gabapentin (NEURONTIN) 100 MG capsule; Take 2 capsules (200 mg) by mouth At Bedtime    Other insomnia  as above  Continue with sleep hygiene  - gabapentin (NEURONTIN) 100 MG capsule; Take 2 capsules (200 mg) by mouth At Bedtime        (Z13.0) Screening for deficiency anemia  Comment:   Plan: CBC with platelets            (Z13.1) Screening for diabetes mellitus (DM)  Comment:   Plan: Comprehensive metabolic panel (BMP + Alb, Alk         Phos, ALT, AST, Total. Bili, TP)            (Z13.220) Screening for hyperlipidemia  Comment:   Plan: Lipid panel reflex to direct LDL Fasting            (Z87.891) Former smoker  Comment:   Plan: As mentioned below    (Z12.2) Screening for lung cancer  Comment: Patient is not meeting the guidelines for lung cancer low-dose chest CT screening, continue to monitor  Plan:   Quit smoking in 2014        [id number]}     Chart documentation done in part with Dragon Voice recognition Software. Although reviewed after completion, some word and grammatical error may remain.    See Patient Instructions    Return in about 2 months (around 4/2/2022), or if symptoms worsen or fail to improve, for Physical Exam, fasting labs.    Ramakrishna Boyer MD  [facility]   Negar is a 56 year old who presents for the following health issues     Patient is here for a video visit instead of in person visit due to the current COVID-19 pandemic.    HPI     Medication Followup of gabapentin    Taking Medication as prescribed: yes    Side  Effects:  None    Medication Helping Symptoms:  yes         Review of Systems   CONSTITUTIONAL: NEGATIVE for fever, chills, change in weight  RESP: NEGATIVE for significant cough or SOB  CV: NEGATIVE for chest pain, palpitations or peripheral edema  PSYCHIATRIC: NEGATIVE for changes in mood or affect      Objective           Vitals:  No vitals were obtained today due to virtual visit.    Physical Exam   GENERAL: Healthy, alert and no distress  EYES: Eyes grossly normal to inspection  RESP: No audible wheeze, cough, or visible cyanosis.  No visible retractions or increased work of breathing.    SKIN: Visible skin clear. No significant rash, abnormal pigmentation or lesions.  NEURO: Cranial nerves grossly intact.  Mentation and speech appropriate for age.  PSYCH: Mentation appears normal, affect normal/bright, judgement and insight intact, normal speech and appearance well-groomed.                Video-Visit Details    Type of service:  Video Visit    Video End Time:2:24 PM    Originating Location (pt. Location): Home    Distant Location (provider location):  [facility]     Platform used for Video Visit: [employer]

## 2022-02-03 ENCOUNTER — ANCILLARY PROCEDURE (OUTPATIENT)
Dept: MAMMOGRAPHY | Facility: CLINIC | Age: 57
End: 2022-02-03
Attending: FAMILY MEDICINE
Payer: COMMERCIAL

## 2022-02-03 DIAGNOSIS — Z12.31 ENCOUNTER FOR SCREENING MAMMOGRAM FOR BREAST CANCER: ICD-10-CM

## 2022-02-03 PROCEDURE — 77067 SCR MAMMO BI INCL CAD: CPT | Performed by: STUDENT IN AN ORGANIZED HEALTH CARE EDUCATION/TRAINING PROGRAM

## 2022-03-09 ENCOUNTER — OFFICE VISIT (OUTPATIENT)
Dept: OPTOMETRY | Facility: CLINIC | Age: 57
End: 2022-03-09
Payer: COMMERCIAL

## 2022-03-09 DIAGNOSIS — H52.13 MYOPIA OF BOTH EYES: ICD-10-CM

## 2022-03-09 DIAGNOSIS — H52.4 PRESBYOPIA: ICD-10-CM

## 2022-03-09 DIAGNOSIS — H10.89 GIANT PAPILLARY CONJUNCTIVITIS: ICD-10-CM

## 2022-03-09 DIAGNOSIS — H52.223 REGULAR ASTIGMATISM OF BOTH EYES: ICD-10-CM

## 2022-03-09 DIAGNOSIS — Z01.00 EXAMINATION OF EYES AND VISION: Primary | ICD-10-CM

## 2022-03-09 PROCEDURE — 92310 CONTACT LENS FITTING OU: CPT | Mod: GA | Performed by: OPTOMETRIST

## 2022-03-09 PROCEDURE — 92015 DETERMINE REFRACTIVE STATE: CPT | Performed by: OPTOMETRIST

## 2022-03-09 PROCEDURE — 92014 COMPRE OPH EXAM EST PT 1/>: CPT | Performed by: OPTOMETRIST

## 2022-03-09 ASSESSMENT — EXTERNAL EXAM - RIGHT EYE: OD_EXAM: NORMAL

## 2022-03-09 ASSESSMENT — REFRACTION_WEARINGRX
OS_ADD: +2.25
OS_SPHERE: -4.25
OD_ADD: +2.25
OD_SPHERE: -5.25
OD_CYLINDER: +0.50
SPECS_TYPE: DIDNT BRING
OD_AXIS: 005

## 2022-03-09 ASSESSMENT — REFRACTION_CURRENTRX
OS_SPHERE: -4.00
OS_BASECURVE: 8.40
OD_BRAND: AIR OPTIX NIGHT AND DAY
OS_DIAMETER: 13.8
OD_SPHERE: -3.00
OS_BRAND: AIR OPTIX NIGHT AND DAY
OD_BASECURVE: 8.40
OD_DIAMETER: 13.8

## 2022-03-09 ASSESSMENT — REFRACTION_MANIFEST
OS_ADD: +2.25
OS_CYLINDER: +0.25
OD_SPHERE: -5.25
OD_CYLINDER: +0.75
OS_SPHERE: -4.25
OS_AXIS: 180
OD_ADD: +2.25
OD_AXIS: 005

## 2022-03-09 ASSESSMENT — CONF VISUAL FIELD
OS_NORMAL: 1
OD_NORMAL: 1

## 2022-03-09 ASSESSMENT — CUP TO DISC RATIO
OS_RATIO: 0.25
OD_RATIO: 0.25

## 2022-03-09 ASSESSMENT — VISUAL ACUITY
CORRECTION_TYPE: CONTACTS
OD_CC: 20/70
METHOD: SNELLEN - LINEAR
OD_CC: 20/25
OS_CC: 20/20
OS_CC: 20/50

## 2022-03-09 ASSESSMENT — TONOMETRY
OS_IOP_MMHG: 17
OD_IOP_MMHG: 15
IOP_METHOD: TONOPEN

## 2022-03-09 ASSESSMENT — EXTERNAL EXAM - LEFT EYE: OS_EXAM: NORMAL

## 2022-03-09 NOTE — PROGRESS NOTES
Chief Complaint   Patient presents with     Annual Eye Exam     More contacts fit fee ok $75     Accompanied by self  Previous contact lens wearer? Yes: air optix night and day- has been taking out more consistently at night.  Comfort of contact lenses :good  Satisfied with current lenses: Yes        Last Eye Exam: 2-  Dilated Previously: Yes    What are you currently using to see?  glasses and contacts    Distance Vision Acuity: Satisfied with vision    Near Vision Acuity: Satisfied with vision while reading contacts monovision    Eye Comfort: itchy when tired   Do you use eye drops? 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     Medical, surgical and family histories reviewed and updated 3/9/2022.       OBJECTIVE: See Ophthalmology exam    ASSESSMENT:    ICD-10-CM    1. Examination of eyes and vision  Z01.00 EYE EXAM (SIMPLE-NONBILLABLE)   2. Myopia of both eyes  H52.13 REFRACTION     CONTACT LENS FITTING,BILAT w/ signed waiver   3. Regular astigmatism of both eyes  H52.223 REFRACTION   4. Presbyopia  H52.4 REFRACTION     CONTACT LENS FITTING,BILAT w/ signed waiver   5. Giant papillary conjunctivitis  H10.89 EYE EXAM (SIMPLE-NONBILLABLE)      PLAN:     Patient Instructions   Eyeglass prescription given.    Contact lens prescription given and form signed.  I recommend taking the lenses out every night.  Sleeping in them increases your chance of eye infection and loss of vision.    OTC Pataday to be used once or twice daily for itchy eyes.     Return in 1 year for a complete eye exam or sooner if needed.    Hi Daily, OD

## 2022-03-09 NOTE — LETTER
3/9/2022         RE: Cynthia M Barthel  [address] [place] [address]        Dear Colleague,    Thank you for referring your patient, Cynthia M Barthel, to the [facility]. Please see a copy of my visit note below.    Chief Complaint   Patient presents with     Annual Eye Exam     More contacts fit fee ok $75     Accompanied by self  Previous contact lens wearer? Yes: air optix night and day- has been taking out more consistently at night.  Comfort of contact lenses :good  Satisfied with current lenses: Yes        Last Eye Exam: 2-  Dilated Previously: Yes    What are you currently using to see?  glasses and contacts    Distance Vision Acuity: Satisfied with vision    Near Vision Acuity: Satisfied with vision while reading contacts monovision    Eye Comfort: itchy when tired   Do you use eye drops? 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     Medical, surgical and family histories reviewed and updated 3/9/2022.       OBJECTIVE: See Ophthalmology exam    ASSESSMENT:    ICD-10-CM    1. Examination of eyes and vision  Z01.00 EYE EXAM (SIMPLE-NONBILLABLE)   2. Myopia of both eyes  H52.13 REFRACTION     CONTACT LENS FITTING,BILAT w/ signed waiver   3. Regular astigmatism of both eyes  H52.223 REFRACTION   4. Presbyopia  H52.4 REFRACTION     CONTACT LENS FITTING,BILAT w/ signed waiver   5. Giant papillary conjunctivitis  H10.89 EYE EXAM (SIMPLE-NONBILLABLE)      PLAN:     Patient Instructions   Eyeglass prescription given.    Contact lens prescription given and form signed.  I recommend taking the lenses out every night.  Sleeping in them increases your chance of eye infection and loss of vision.    OTC Pataday to be used once or twice daily for itchy eyes.     Return in 1 year for a complete eye exam or sooner if needed.    Hi Daily, OD                              Again, thank you for allowing me to participate in the care of your patient.        Sincerely,        Hi Daily OD

## 2022-03-09 NOTE — PATIENT INSTRUCTIONS
Eyeglass prescription given.    Contact lens prescription given and form signed.  I recommend taking the lenses out every night.  Sleeping in them increases your chance of eye infection and loss of vision.    OTC Pataday to be used once or twice daily for itchy eyes.     Return in 1 year for a complete eye exam or sooner if needed.    Hi Daily, ANN MARIE    The affects of the dilating drops last for 4- 6 hours.  You will be more sensitive to light and vision will be blurry up close.  Do not drive if you do not feel comfortable.  Mydriatic sunglasses were given if needed.      Optometry Providers       Clinic Locations                                 Telephone Number   Dr. Michelle Arzate [phone]     [facility] Hours:                [facility] Hours:       [facility]:   [address]   [address] [address] [facility]     [address]  [place] [address]   [place] [address]    [place] [address]  Phone: [phone]                    Phone: [phone]     Phone: [phone]                      Monday 8:00-6:00                          Monday 8:00-6:00                          Monday 8:00-6:00              Tuesday 8:00-6:00                          Tuesday 8:00-6:00                          Tuesday 8:00-6:00              Wednesday 8:00-6:00                  Wednesday 8:00-6:00                   Wednesday 8:00-6:00      Thursday 8:00-6:00                        Thursday 8:00-6:00                         Thursday 8:00-6:00            Friday 8:00-5:00                              Friday 8:00-5:00                              Friday 8:00-5:00    [facility] Hours:   [address] [place] [id number] [phone]    Monday 9:00-6:00  Tuesday 9:00-6:00  Wednesday 9:00-6:00  Thursday 9:00-6:00  Friday 9:00-5:00  Please log on to [email or web address]  to order your contact lenses.  The link is found on the [facility] page.  As always, Thank you for trusting us with your health care needs!

## 2022-04-05 ENCOUNTER — LAB (OUTPATIENT)
Dept: LAB | Facility: CLINIC | Age: 57
End: 2022-04-05
Payer: COMMERCIAL

## 2022-04-05 DIAGNOSIS — Z13.220 SCREENING FOR HYPERLIPIDEMIA: ICD-10-CM

## 2022-04-05 DIAGNOSIS — Z13.1 SCREENING FOR DIABETES MELLITUS (DM): ICD-10-CM

## 2022-04-05 DIAGNOSIS — E89.0 POSTSURGICAL HYPOTHYROIDISM: ICD-10-CM

## 2022-04-05 DIAGNOSIS — Z13.0 SCREENING FOR DEFICIENCY ANEMIA: ICD-10-CM

## 2022-04-05 LAB
ALBUMIN SERPL-MCNC: 4.2 G/DL (ref 3.4–5)
ALP SERPL-CCNC: 99 U/L (ref 40–150)
ALT SERPL W P-5'-P-CCNC: 43 U/L (ref 0–50)
ANION GAP SERPL CALCULATED.3IONS-SCNC: 6 MMOL/L (ref 3–14)
AST SERPL W P-5'-P-CCNC: 24 U/L (ref 0–45)
BILIRUB SERPL-MCNC: 0.4 MG/DL (ref 0.2–1.3)
BUN SERPL-MCNC: 8 MG/DL (ref 7–30)
CALCIUM SERPL-MCNC: 9 MG/DL (ref 8.5–10.1)
CHLORIDE BLD-SCNC: 105 MMOL/L (ref 94–109)
CHOLEST SERPL-MCNC: 216 MG/DL
CO2 SERPL-SCNC: 29 MMOL/L (ref 20–32)
CREAT SERPL-MCNC: 0.65 MG/DL (ref 0.52–1.04)
ERYTHROCYTE [DISTWIDTH] IN BLOOD BY AUTOMATED COUNT: 12.9 % (ref 10–15)
FASTING STATUS PATIENT QL REPORTED: YES
GFR SERPL CREATININE-BSD FRML MDRD: >90 ML/MIN/1.73M2
GLUCOSE BLD-MCNC: 96 MG/DL (ref 70–99)
HCT VFR BLD AUTO: 40.4 % (ref 35–47)
HDLC SERPL-MCNC: 107 MG/DL
HGB BLD-MCNC: 13.8 G/DL (ref 11.7–15.7)
LDLC SERPL CALC-MCNC: 79 MG/DL
MCH RBC QN AUTO: 30.9 PG (ref 26.5–33)
MCHC RBC AUTO-ENTMCNC: 34.2 G/DL (ref 31.5–36.5)
MCV RBC AUTO: 91 FL (ref 78–100)
NONHDLC SERPL-MCNC: 109 MG/DL
PLATELET # BLD AUTO: 309 10E3/UL (ref 150–450)
POTASSIUM BLD-SCNC: 4 MMOL/L (ref 3.4–5.3)
PROT SERPL-MCNC: 7.8 G/DL (ref 6.8–8.8)
RBC # BLD AUTO: 4.46 10E6/UL (ref 3.8–5.2)
SODIUM SERPL-SCNC: 140 MMOL/L (ref 133–144)
T4 FREE SERPL-MCNC: 1.07 NG/DL (ref 0.76–1.46)
TRIGL SERPL-MCNC: 149 MG/DL
TSH SERPL DL<=0.005 MIU/L-ACNC: 1.88 MU/L (ref 0.4–4)
WBC # BLD AUTO: 9 10E3/UL (ref 4–11)

## 2022-04-05 PROCEDURE — 84443 ASSAY THYROID STIM HORMONE: CPT

## 2022-04-05 PROCEDURE — 80061 LIPID PANEL: CPT

## 2022-04-05 PROCEDURE — 36415 COLL VENOUS BLD VENIPUNCTURE: CPT

## 2022-04-05 PROCEDURE — 84439 ASSAY OF FREE THYROXINE: CPT

## 2022-04-05 PROCEDURE — 80053 COMPREHEN METABOLIC PANEL: CPT

## 2022-04-05 PROCEDURE — 85027 COMPLETE CBC AUTOMATED: CPT

## 2022-04-05 ASSESSMENT — ENCOUNTER SYMPTOMS
SHORTNESS OF BREATH: 0
HEMATURIA: 0
JOINT SWELLING: 0
COUGH: 0
NERVOUS/ANXIOUS: 1
CHILLS: 0
WEAKNESS: 0
ABDOMINAL PAIN: 0
ARTHRALGIAS: 1
DYSURIA: 0
CONSTIPATION: 0
NAUSEA: 0
HEMATOCHEZIA: 0
DIARRHEA: 0
HEADACHES: 1
MYALGIAS: 0
BREAST MASS: 0
SORE THROAT: 0
PARESTHESIAS: 0
EYE PAIN: 0
DIZZINESS: 0
PALPITATIONS: 0
HEARTBURN: 0
FREQUENCY: 0
FEVER: 0

## 2022-04-05 ASSESSMENT — PATIENT HEALTH QUESTIONNAIRE - PHQ9
SUM OF ALL RESPONSES TO PHQ QUESTIONS 1-9: 7
SUM OF ALL RESPONSES TO PHQ QUESTIONS 1-9: 7
10. IF YOU CHECKED OFF ANY PROBLEMS, HOW DIFFICULT HAVE THESE PROBLEMS MADE IT FOR YOU TO DO YOUR WORK, TAKE CARE OF THINGS AT HOME, OR GET ALONG WITH OTHER PEOPLE: SOMEWHAT DIFFICULT

## 2022-04-06 ENCOUNTER — OFFICE VISIT (OUTPATIENT)
Dept: FAMILY MEDICINE | Facility: CLINIC | Age: 57
End: 2022-04-06
Payer: COMMERCIAL

## 2022-04-06 VITALS
TEMPERATURE: 98.1 F | RESPIRATION RATE: 16 BRPM | DIASTOLIC BLOOD PRESSURE: 78 MMHG | HEART RATE: 77 BPM | HEIGHT: 63 IN | BODY MASS INDEX: 19.97 KG/M2 | WEIGHT: 112.7 LBS | OXYGEN SATURATION: 97 % | SYSTOLIC BLOOD PRESSURE: 130 MMHG

## 2022-04-06 DIAGNOSIS — N95.1 MENOPAUSAL SYNDROME (HOT FLASHES): ICD-10-CM

## 2022-04-06 DIAGNOSIS — Z23 NEED FOR SHINGLES VACCINE: ICD-10-CM

## 2022-04-06 DIAGNOSIS — Z12.11 COLON CANCER SCREENING: ICD-10-CM

## 2022-04-06 DIAGNOSIS — Z13.820 SCREENING FOR OSTEOPOROSIS: ICD-10-CM

## 2022-04-06 DIAGNOSIS — Z12.4 CERVICAL CANCER SCREENING: ICD-10-CM

## 2022-04-06 DIAGNOSIS — E89.0 POSTSURGICAL HYPOTHYROIDISM: ICD-10-CM

## 2022-04-06 DIAGNOSIS — F10.20 UNCOMPLICATED ALCOHOL DEPENDENCE (H): ICD-10-CM

## 2022-04-06 DIAGNOSIS — G47.09 OTHER INSOMNIA: ICD-10-CM

## 2022-04-06 DIAGNOSIS — Z12.31 ENCOUNTER FOR SCREENING MAMMOGRAM FOR MALIGNANT NEOPLASM OF BREAST: ICD-10-CM

## 2022-04-06 DIAGNOSIS — Z82.62 FAMILY HISTORY OF OSTEOPOROSIS: ICD-10-CM

## 2022-04-06 DIAGNOSIS — Z23 HIGH PRIORITY FOR 2019-NCOV VACCINE: ICD-10-CM

## 2022-04-06 DIAGNOSIS — Z00.00 ROUTINE GENERAL MEDICAL EXAMINATION AT A HEALTH CARE FACILITY: Primary | ICD-10-CM

## 2022-04-06 DIAGNOSIS — Z87.2 HISTORY OF ACTINIC KERATOSIS: ICD-10-CM

## 2022-04-06 DIAGNOSIS — D12.6 TUBULAR ADENOMA OF COLON: ICD-10-CM

## 2022-04-06 DIAGNOSIS — M41.25 OTHER IDIOPATHIC SCOLIOSIS, THORACOLUMBAR REGION: ICD-10-CM

## 2022-04-06 DIAGNOSIS — T63.441S BEE STING REACTION, ACCIDENTAL OR UNINTENTIONAL, SEQUELA: ICD-10-CM

## 2022-04-06 DIAGNOSIS — Z78.0 MENOPAUSE: ICD-10-CM

## 2022-04-06 DIAGNOSIS — R87.810 CERVICAL HIGH RISK HPV (HUMAN PAPILLOMAVIRUS) TEST POSITIVE: ICD-10-CM

## 2022-04-06 DIAGNOSIS — Z87.891 FORMER SMOKER: ICD-10-CM

## 2022-04-06 DIAGNOSIS — D50.9 IRON DEFICIENCY ANEMIA, UNSPECIFIED IRON DEFICIENCY ANEMIA TYPE: ICD-10-CM

## 2022-04-06 PROCEDURE — 99396 PREV VISIT EST AGE 40-64: CPT | Performed by: FAMILY MEDICINE

## 2022-04-06 PROCEDURE — 0054A COVID-19,PF,PFIZER (12+ YRS): CPT | Performed by: FAMILY MEDICINE

## 2022-04-06 PROCEDURE — G0145 SCR C/V CYTO,THINLAYER,RESCR: HCPCS | Performed by: FAMILY MEDICINE

## 2022-04-06 PROCEDURE — 87624 HPV HI-RISK TYP POOLED RSLT: CPT | Performed by: FAMILY MEDICINE

## 2022-04-06 PROCEDURE — 91305 COVID-19,PF,PFIZER (12+ YRS): CPT | Performed by: FAMILY MEDICINE

## 2022-04-06 RX ORDER — GABAPENTIN 100 MG/1
200 CAPSULE ORAL AT BEDTIME
Qty: 180 CAPSULE | Refills: 3 | Status: SHIPPED | OUTPATIENT
Start: 2022-04-06 | End: 2022-06-30

## 2022-04-06 RX ORDER — EPINEPHRINE 0.3 MG/.3ML
0.3 INJECTION SUBCUTANEOUS PRN
Qty: 2 EACH | Refills: 1 | Status: SHIPPED | OUTPATIENT
Start: 2022-04-06

## 2022-04-06 RX ORDER — GABAPENTIN 100 MG/1
200 CAPSULE ORAL AT BEDTIME
Qty: 180 CAPSULE | Refills: 1 | Status: SHIPPED | OUTPATIENT
Start: 2022-04-06 | End: 2022-04-06

## 2022-04-06 ASSESSMENT — ENCOUNTER SYMPTOMS
NAUSEA: 0
JOINT SWELLING: 0
SHORTNESS OF BREATH: 0
BREAST MASS: 0
CHILLS: 0
WEAKNESS: 0
DIARRHEA: 0
HEADACHES: 1
HEMATOCHEZIA: 0
HEARTBURN: 0
FREQUENCY: 0
FEVER: 0
SORE THROAT: 0
ARTHRALGIAS: 1
DYSURIA: 0
EYE PAIN: 0
NERVOUS/ANXIOUS: 1
COUGH: 0
PALPITATIONS: 0
PARESTHESIAS: 0
DIZZINESS: 0
HEMATURIA: 0
ABDOMINAL PAIN: 0
MYALGIAS: 0
CONSTIPATION: 0

## 2022-04-06 ASSESSMENT — PATIENT HEALTH QUESTIONNAIRE - PHQ9: SUM OF ALL RESPONSES TO PHQ QUESTIONS 1-9: 7

## 2022-04-06 ASSESSMENT — PAIN SCALES - GENERAL: PAINLEVEL: NO PAIN (0)

## 2022-04-06 NOTE — NURSING NOTE
Prior to immunization administration, verified patients identity using patient s name and date of birth. Please see Immunization Activity for additional information.     Screening Questionnaire for Adult Immunization    Are you sick today?   No   Do you have allergies to medications, food, a vaccine component or latex?   No   Have you ever had a serious reaction after receiving a vaccination?   No   Do you have a long-term health problem with heart, lung, kidney, or metabolic disease (e.g., diabetes), asthma, a blood disorder, no spleen, complement component deficiency, a cochlear implant, or a spinal fluid leak?  Are you on long-term aspirin therapy?   No   Do you have cancer, leukemia, HIV/AIDS, or any other immune system problem?   No   Do you have a parent, brother, or sister with an immune system problem?   No   In the past 3 months, have you taken medications that affect  your immune system, such as prednisone, other steroids, or anticancer drugs; drugs for the treatment of rheumatoid arthritis, Crohn s disease, or psoriasis; or have you had radiation treatments?   No   Have you had a seizure, or a brain or other nervous system problem?   No   During the past year, have you received a transfusion of blood or blood    products, or been given immune (gamma) globulin or antiviral drug?   No   For women: Are you pregnant or is there a chance you could become       pregnant during the next month?   No   Have you received any vaccinations in the past 4 weeks?   No     Immunization questionnaire answers were all negative.        Per orders of Dr. Boyer, injection of Pfizer Covid 2nd Booster given by Yana Adame RN. Patient instructed to remain in clinic for 15 minutes afterwards, and to report any adverse reaction to me immediately.       Screening performed by Yana Adame RN on 4/6/2022 at 8:36 AM.
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
## 2022-04-08 LAB
BKR LAB AP GYN ADEQUACY: NORMAL
BKR LAB AP GYN INTERPRETATION: NORMAL
BKR LAB AP HPV REFLEX: NORMAL
BKR LAB AP PREVIOUS ABNORMAL: NORMAL
PATH REPORT.COMMENTS IMP SPEC: NORMAL
PATH REPORT.COMMENTS IMP SPEC: NORMAL
PATH REPORT.RELEVANT HX SPEC: NORMAL

## 2022-04-09 NOTE — RESULT ENCOUNTER NOTE
My orders for thyroid hormone levels were released by your primary care provider.  I am not sure why.  The levels have remained normal.

## 2022-04-13 LAB
HUMAN PAPILLOMA VIRUS 16 DNA: NEGATIVE
HUMAN PAPILLOMA VIRUS 18 DNA: NEGATIVE
HUMAN PAPILLOMA VIRUS FINAL DIAGNOSIS: ABNORMAL
HUMAN PAPILLOMA VIRUS OTHER HR: POSITIVE

## 2022-04-14 ENCOUNTER — PATIENT OUTREACH (OUTPATIENT)
Dept: FAMILY MEDICINE | Facility: CLINIC | Age: 57
End: 2022-04-14
Payer: COMMERCIAL

## 2022-04-14 DIAGNOSIS — R87.810 CERVICAL HIGH RISK HPV (HUMAN PAPILLOMAVIRUS) TEST POSITIVE: ICD-10-CM

## 2022-06-08 ENCOUNTER — OFFICE VISIT (OUTPATIENT)
Dept: OBGYN | Facility: CLINIC | Age: 57
End: 2022-06-08
Payer: COMMERCIAL

## 2022-06-08 VITALS
OXYGEN SATURATION: 97 % | HEART RATE: 96 BPM | DIASTOLIC BLOOD PRESSURE: 76 MMHG | BODY MASS INDEX: 19.06 KG/M2 | SYSTOLIC BLOOD PRESSURE: 127 MMHG | WEIGHT: 107.6 LBS

## 2022-06-08 DIAGNOSIS — R87.810 CERVICAL HIGH RISK HPV (HUMAN PAPILLOMAVIRUS) TEST POSITIVE: Primary | ICD-10-CM

## 2022-06-08 DIAGNOSIS — Z32.00 PREGNANCY EXAMINATION OR TEST, PREGNANCY UNCONFIRMED: ICD-10-CM

## 2022-06-08 PROCEDURE — 57456 ENDOCERV CURETTAGE W/SCOPE: CPT | Performed by: OBSTETRICS & GYNECOLOGY

## 2022-06-08 PROCEDURE — 88305 TISSUE EXAM BY PATHOLOGIST: CPT | Performed by: PATHOLOGY

## 2022-06-08 NOTE — PATIENT INSTRUCTIONS
If you have any questions regarding your visit, Please contact your care team.    [employer] [facility]: [phone]    To Schedule an Appointment 24/7  Call: [phone]   Tanika Orozco DO.    RUPINDER Garcia -Surgery Scheduler  Aleksandra - Surgery Scheduler    Neida, RN  Elaine, RN  Dilia, JUDY   [place]  Wednesday and Friday  8:30 a.m-5:00 p.m    [place]-Temporary  Monday 8:30 a.m-5:00 p.m  Typical Surgery day:  Tuesday [facility]  [address] [place] [address] [phone] Phone  [phone] Fax    Imaging Scheduling-All Locations [phone]    [facility]  [address] [place] [address]     Urgent Care locations:  [facility] Monday-Friday   10 am - 8 pm  Saturday and Sunday   9 am - 5 pm [phone]   **Surgeries** Our Surgery Schedulers will contact you to schedule. If you do not receive a call within 3 business days, please call [phone].    [facility] Labor and Delivery:  [phone]    If you need a medication refill, please contact your pharmacy. Please allow 3 business days for your refill to be completed.  As always, Thank you for trusting us with your healthcare needs!  see additional instructions from your care team below

## 2022-06-08 NOTE — PROGRESS NOTES
"This 55 y/o postmenopausal female, , presents for colposcopy examination due to her abnormal DNA marker test results for \"other\" high-risk subtypes on 2022.  Her pap smear results on that same date were normal.  She feels that her vaginal tissue is dry so prefers use of a pediatric-sized speculum since she cannot tolerate a larger one.  She denies any postmenopausal spotting or bleeding issues, and is no longer sexually active.  She admits to resuming cigarette smoking over the past 6 months so we discussed the need to quit.  /76 (BP Location: Left arm, Patient Position: Sitting, Cuff Size: Adult Regular)   Pulse 96   Wt 48.8 kg (107 lb 9.6 oz)   SpO2 97%   BMI 19.06 kg/m    ROS:  10 systems were reviewed and the positives were listed under problems.  Informed consent was reviewed and obtained for the colposcopy procedure and biopsy(ies).  In the dorsal lithotomy position, a pediatric-sized bi-valve speculum was placed and the cervix was soaked with 3% acetic acid.  After several minutes, this fluid was removed and her cervix was visualized with a colposcope.  No areas of acetowhite discoloration were noted to involve the ectocervix so an endocervical curettage was performed and this tissue specimen was submitted to pathology.  EBL was 1 ml and there were no complications.  The patient tolerated the procedure well and this was a satisfactory procedure.  Atrophic changes of the vaginal mucosa were noted.  Assessment - Colposcopy examination with ECC due to an abnormal DNA marker test for \"other\" high-risk HPV subtypes coupled with a normal pap smear, smoker  Plan - We discussed her need to completely quit smoking since she restarted due to stress.  She feels that she can successfully quit on her own and does not need medication.  She plans to retire soon and focus on improving her lifestyle.  She has not been eating fresh produce so will start.  The tissue specimen was submitted to pathology and " will treat if indicated.  If normal results are found, then she is to return in 1 year for her next co-test.  We also discussed use of Replens due to atrophic changes and she is comfortable with this plan.  20 minutes were spent today in chart review, the patient visit, review of tests, and documentation in regards to the issues noted above.  This did NOT include the time spent in the procedure (colposcopy with ECC biopsy).

## 2022-06-10 LAB
PATH REPORT.COMMENTS IMP SPEC: NORMAL
PATH REPORT.COMMENTS IMP SPEC: NORMAL
PATH REPORT.FINAL DX SPEC: NORMAL
PATH REPORT.GROSS SPEC: NORMAL
PATH REPORT.MICROSCOPIC SPEC OTHER STN: NORMAL
PATH REPORT.RELEVANT HX SPEC: NORMAL
PHOTO IMAGE: NORMAL

## 2022-06-29 ENCOUNTER — MYC MEDICAL ADVICE (OUTPATIENT)
Dept: FAMILY MEDICINE | Facility: CLINIC | Age: 57
End: 2022-06-29

## 2022-06-29 DIAGNOSIS — G47.09 OTHER INSOMNIA: ICD-10-CM

## 2022-06-29 DIAGNOSIS — N95.1 MENOPAUSAL SYNDROME (HOT FLASHES): ICD-10-CM

## 2022-06-29 NOTE — TELEPHONE ENCOUNTER
Provider:  Please see MyChart message from the patient. It looks like you have addressed this before.  Please let us know if you would like more information. Thank you. Gunjan Contreras R.N.

## 2022-06-30 RX ORDER — GABAPENTIN 100 MG/1
200 CAPSULE ORAL 2 TIMES DAILY
Qty: 360 CAPSULE | Refills: 3 | Status: SHIPPED | OUTPATIENT
Start: 2022-06-30 | End: 2023-06-13

## 2022-07-06 ENCOUNTER — PATIENT OUTREACH (OUTPATIENT)
Dept: FAMILY MEDICINE | Facility: CLINIC | Age: 57
End: 2022-07-06

## 2022-10-10 ENCOUNTER — HEALTH MAINTENANCE LETTER (OUTPATIENT)
Age: 57
End: 2022-10-10

## 2022-10-20 ENCOUNTER — MYC MEDICAL ADVICE (OUTPATIENT)
Dept: FAMILY MEDICINE | Facility: CLINIC | Age: 57
End: 2022-10-20

## 2023-05-27 ENCOUNTER — HEALTH MAINTENANCE LETTER (OUTPATIENT)
Age: 58
End: 2023-05-27

## 2023-06-07 ENCOUNTER — MYC MEDICAL ADVICE (OUTPATIENT)
Dept: FAMILY MEDICINE | Facility: CLINIC | Age: 58
End: 2023-06-07
Payer: COMMERCIAL

## 2023-06-07 DIAGNOSIS — E61.1 IRON DEFICIENCY: ICD-10-CM

## 2023-06-07 DIAGNOSIS — Z13.1 SCREENING FOR DIABETES MELLITUS (DM): ICD-10-CM

## 2023-06-07 DIAGNOSIS — E89.0 POSTSURGICAL HYPOTHYROIDISM: ICD-10-CM

## 2023-06-07 DIAGNOSIS — Z13.220 SCREENING FOR HYPERLIPIDEMIA: ICD-10-CM

## 2023-06-07 DIAGNOSIS — Z13.0 SCREENING FOR DEFICIENCY ANEMIA: Primary | ICD-10-CM

## 2023-06-07 ASSESSMENT — ENCOUNTER SYMPTOMS
DIZZINESS: 0
ARTHRALGIAS: 1
SORE THROAT: 0
DIARRHEA: 0
BREAST MASS: 0
HEARTBURN: 0
HEADACHES: 0
DYSURIA: 0
MYALGIAS: 0
FREQUENCY: 0
PALPITATIONS: 0
SHORTNESS OF BREATH: 0
WEAKNESS: 0
CONSTIPATION: 0
EYE PAIN: 0
COUGH: 1
NAUSEA: 0
JOINT SWELLING: 1
FEVER: 0
HEMATOCHEZIA: 0
HEMATURIA: 0
ABDOMINAL PAIN: 0
CHILLS: 0
NERVOUS/ANXIOUS: 0
PARESTHESIAS: 0

## 2023-06-07 NOTE — TELEPHONE ENCOUNTER
Routing to provider to review and advise on previsit lab orders.    JUDY Carolina  [facility] Primary Care Triage

## 2023-06-12 ENCOUNTER — LAB (OUTPATIENT)
Dept: LAB | Facility: CLINIC | Age: 58
End: 2023-06-12
Payer: COMMERCIAL

## 2023-06-12 DIAGNOSIS — Z13.220 SCREENING FOR HYPERLIPIDEMIA: ICD-10-CM

## 2023-06-12 DIAGNOSIS — E61.1 IRON DEFICIENCY: ICD-10-CM

## 2023-06-12 DIAGNOSIS — Z13.1 SCREENING FOR DIABETES MELLITUS (DM): ICD-10-CM

## 2023-06-12 DIAGNOSIS — Z13.0 SCREENING FOR DEFICIENCY ANEMIA: ICD-10-CM

## 2023-06-12 LAB
ALBUMIN SERPL BCG-MCNC: 4.6 G/DL (ref 3.5–5.2)
ALP SERPL-CCNC: 110 U/L (ref 35–104)
ALT SERPL W P-5'-P-CCNC: 26 U/L (ref 10–35)
ANION GAP SERPL CALCULATED.3IONS-SCNC: 13 MMOL/L (ref 7–15)
AST SERPL W P-5'-P-CCNC: 26 U/L (ref 10–35)
BILIRUB SERPL-MCNC: 0.4 MG/DL
BUN SERPL-MCNC: 12.2 MG/DL (ref 8–23)
CALCIUM SERPL-MCNC: 9.5 MG/DL (ref 8.6–10)
CHLORIDE SERPL-SCNC: 104 MMOL/L (ref 98–107)
CHOLEST SERPL-MCNC: 186 MG/DL
CREAT SERPL-MCNC: 0.69 MG/DL (ref 0.51–0.95)
DEPRECATED HCO3 PLAS-SCNC: 24 MMOL/L (ref 22–29)
ERYTHROCYTE [DISTWIDTH] IN BLOOD BY AUTOMATED COUNT: 12.2 % (ref 10–15)
FERRITIN SERPL-MCNC: 66 NG/ML (ref 11–328)
GFR SERPL CREATININE-BSD FRML MDRD: >90 ML/MIN/1.73M2
GLUCOSE SERPL-MCNC: 98 MG/DL (ref 70–99)
HCT VFR BLD AUTO: 39.7 % (ref 35–47)
HDLC SERPL-MCNC: 109 MG/DL
HGB BLD-MCNC: 13.3 G/DL (ref 11.7–15.7)
IRON BINDING CAPACITY (ROCHE): 323 UG/DL (ref 240–430)
IRON SATN MFR SERPL: 36 % (ref 15–46)
IRON SERPL-MCNC: 117 UG/DL (ref 37–145)
LDLC SERPL CALC-MCNC: 66 MG/DL
MCH RBC QN AUTO: 29.8 PG (ref 26.5–33)
MCHC RBC AUTO-ENTMCNC: 33.5 G/DL (ref 31.5–36.5)
MCV RBC AUTO: 89 FL (ref 78–100)
NONHDLC SERPL-MCNC: 77 MG/DL
PLATELET # BLD AUTO: 293 10E3/UL (ref 150–450)
POTASSIUM SERPL-SCNC: 4.2 MMOL/L (ref 3.4–5.3)
PROT SERPL-MCNC: 7.6 G/DL (ref 6.4–8.3)
RBC # BLD AUTO: 4.47 10E6/UL (ref 3.8–5.2)
SODIUM SERPL-SCNC: 141 MMOL/L (ref 136–145)
TRIGL SERPL-MCNC: 54 MG/DL
WBC # BLD AUTO: 8.7 10E3/UL (ref 4–11)

## 2023-06-12 PROCEDURE — 83550 IRON BINDING TEST: CPT

## 2023-06-12 PROCEDURE — 84439 ASSAY OF FREE THYROXINE: CPT | Performed by: FAMILY MEDICINE

## 2023-06-12 PROCEDURE — 80053 COMPREHEN METABOLIC PANEL: CPT

## 2023-06-12 PROCEDURE — 85027 COMPLETE CBC AUTOMATED: CPT

## 2023-06-12 PROCEDURE — 80061 LIPID PANEL: CPT

## 2023-06-12 PROCEDURE — 83540 ASSAY OF IRON: CPT

## 2023-06-12 PROCEDURE — 84443 ASSAY THYROID STIM HORMONE: CPT | Performed by: FAMILY MEDICINE

## 2023-06-12 PROCEDURE — 84480 ASSAY TRIIODOTHYRONINE (T3): CPT | Performed by: FAMILY MEDICINE

## 2023-06-12 PROCEDURE — 82728 ASSAY OF FERRITIN: CPT

## 2023-06-12 PROCEDURE — 36415 COLL VENOUS BLD VENIPUNCTURE: CPT

## 2023-06-13 ENCOUNTER — OFFICE VISIT (OUTPATIENT)
Dept: FAMILY MEDICINE | Facility: CLINIC | Age: 58
End: 2023-06-13
Payer: COMMERCIAL

## 2023-06-13 VITALS
TEMPERATURE: 98.5 F | RESPIRATION RATE: 15 BRPM | BODY MASS INDEX: 20.61 KG/M2 | HEIGHT: 63 IN | WEIGHT: 116.3 LBS | DIASTOLIC BLOOD PRESSURE: 73 MMHG | OXYGEN SATURATION: 96 % | HEART RATE: 75 BPM | SYSTOLIC BLOOD PRESSURE: 120 MMHG

## 2023-06-13 DIAGNOSIS — M25.572 PAIN IN JOINT, ANKLE AND FOOT, LEFT: ICD-10-CM

## 2023-06-13 DIAGNOSIS — F10.21 ALCOHOL DEPENDENCE IN REMISSION (H): ICD-10-CM

## 2023-06-13 DIAGNOSIS — Z87.2 HISTORY OF ACTINIC KERATOSIS: ICD-10-CM

## 2023-06-13 DIAGNOSIS — Z12.4 CERVICAL CANCER SCREENING: ICD-10-CM

## 2023-06-13 DIAGNOSIS — Z12.11 COLON CANCER SCREENING: ICD-10-CM

## 2023-06-13 DIAGNOSIS — E89.0 POSTSURGICAL HYPOTHYROIDISM: ICD-10-CM

## 2023-06-13 DIAGNOSIS — R87.810 CERVICAL HIGH RISK HPV (HUMAN PAPILLOMAVIRUS) TEST POSITIVE: ICD-10-CM

## 2023-06-13 DIAGNOSIS — R74.8 ELEVATED ALKALINE PHOSPHATASE LEVEL: ICD-10-CM

## 2023-06-13 DIAGNOSIS — Z00.00 ROUTINE GENERAL MEDICAL EXAMINATION AT A HEALTH CARE FACILITY: Primary | ICD-10-CM

## 2023-06-13 DIAGNOSIS — N95.1 MENOPAUSAL SYNDROME (HOT FLASHES): ICD-10-CM

## 2023-06-13 DIAGNOSIS — G47.09 OTHER INSOMNIA: ICD-10-CM

## 2023-06-13 DIAGNOSIS — D12.6 TUBULAR ADENOMA OF COLON: ICD-10-CM

## 2023-06-13 DIAGNOSIS — R09.81 NASAL CONGESTION: ICD-10-CM

## 2023-06-13 PROBLEM — F10.20 ALCOHOL DEPENDENCE (H): Status: RESOLVED | Noted: 2022-04-06 | Resolved: 2023-06-13

## 2023-06-13 PROBLEM — F10.20 ALCOHOL DEPENDENCE (H): Status: ACTIVE | Noted: 2023-06-13

## 2023-06-13 LAB
T3 SERPL-MCNC: 81 NG/DL (ref 85–202)
T4 FREE SERPL-MCNC: 1.49 NG/DL (ref 0.9–1.7)
TSH SERPL DL<=0.005 MIU/L-ACNC: 3.74 UIU/ML (ref 0.3–4.2)

## 2023-06-13 PROCEDURE — 90471 IMMUNIZATION ADMIN: CPT | Performed by: FAMILY MEDICINE

## 2023-06-13 PROCEDURE — 99213 OFFICE O/P EST LOW 20 MIN: CPT | Mod: 25 | Performed by: FAMILY MEDICINE

## 2023-06-13 PROCEDURE — 99396 PREV VISIT EST AGE 40-64: CPT | Mod: 25 | Performed by: FAMILY MEDICINE

## 2023-06-13 PROCEDURE — 90677 PCV20 VACCINE IM: CPT | Performed by: FAMILY MEDICINE

## 2023-06-13 RX ORDER — GABAPENTIN 100 MG/1
200 CAPSULE ORAL 2 TIMES DAILY
Qty: 360 CAPSULE | Refills: 3 | Status: SHIPPED | OUTPATIENT
Start: 2023-06-13

## 2023-06-13 ASSESSMENT — ENCOUNTER SYMPTOMS
ARTHRALGIAS: 1
FEVER: 0
HEADACHES: 0
EYE PAIN: 0
PARESTHESIAS: 0
CHILLS: 0
WEAKNESS: 0
DIARRHEA: 0
SHORTNESS OF BREATH: 0
CONSTIPATION: 0
NAUSEA: 0
ABDOMINAL PAIN: 0
DYSURIA: 0
BREAST MASS: 0
JOINT SWELLING: 1
PALPITATIONS: 0
MYALGIAS: 0
NERVOUS/ANXIOUS: 0
HEARTBURN: 0
DIZZINESS: 0
COUGH: 1
FREQUENCY: 0
HEMATOCHEZIA: 0
HEMATURIA: 0
SORE THROAT: 0

## 2023-06-13 ASSESSMENT — PATIENT HEALTH QUESTIONNAIRE - PHQ9
10. IF YOU CHECKED OFF ANY PROBLEMS, HOW DIFFICULT HAVE THESE PROBLEMS MADE IT FOR YOU TO DO YOUR WORK, TAKE CARE OF THINGS AT HOME, OR GET ALONG WITH OTHER PEOPLE: SOMEWHAT DIFFICULT
SUM OF ALL RESPONSES TO PHQ QUESTIONS 1-9: 4
SUM OF ALL RESPONSES TO PHQ QUESTIONS 1-9: 4

## 2023-06-13 ASSESSMENT — PAIN SCALES - GENERAL: PAINLEVEL: MILD PAIN (2)

## 2023-06-13 NOTE — PROGRESS NOTES
SUBJECTIVE:   CC: Negar is an 57 year old who presents for preventive health visit.       2023    10:52 AM   Additional Questions   Roomed by Beth     Healthy Habits:     Getting at least 3 servings of Calcium per day:  NO    Bi-annual eye exam:  Yes    Dental care twice a year:  Yes    Sleep apnea or symptoms of sleep apnea:  Daytime drowsiness    Diet:  Vegetarian/vegan    Frequency of exercise:  2-3 days/week    Duration of exercise:  15-30 minutes    Taking medications regularly:  Yes    Medication side effects:  None    PHQ-2 Total Score: 2    Additional concerns today:  Yes    - Pt wondering about ankle          Medication Followup of gabapentin    Taking Medication as prescribed: yes    Side Effects:  None    Medication Helping Symptoms:  yes          Social History     Tobacco Use     Smoking status: Former     Packs/day: 0.50     Years: 20.00     Pack years: 10.00     Types: Cigarettes     Start date: 1981     Quit date: 2/10/2014     Years since quittin.3     Smokeless tobacco: Never     Tobacco comments:     started smoking in ,  PPD     Quit in 2023   Vaping Use     Vaping status: Never Used   Substance Use Topics     Alcohol use: Yes     Comment: once a month             2023     2:11 PM   Alcohol Use   Prescreen: >3 drinks/day or >7 drinks/week? No         2022     9:55 AM   AUDIT - Alcohol Use Disorders Identification Test - Reproduced from the World Health Organization Audit 2001 (Second Edition)   1.  How often do you have a drink containing alcohol? 2 to 3 times a week   2.  How many drinks containing alcohol do you have on a typical day when you are drinking? 1 or 2   3.  How often do you have five or more drinks on one occasion? Never   4.  How often during the last year have you found that you were not able to stop drinking once you had started? Monthly   5.  How often during the last year have you failed to do what was normally expected of you because of  drinking? Never   6.  How often during the last year have you needed a first drink in the morning to get yourself going after a heavy drinking session? Never   7.  How often during the last year have you had a feeling of guilt or remorse after drinking? Less than monthly   8.  How often during the last year have you been unable to remember what happened the night before because of your drinking? Never   9.  Have you or someone else been injured because of your drinking? No   10. Has a relative, friend, doctor or other health care worker been concerned about your drinking or suggested you cut down? No   TOTAL SCORE 6     Reviewed orders with patient.  Reviewed health maintenance and updated orders accordingly - Yes  Lab work is in process  Labs reviewed in EPIC  BP Readings from Last 3 Encounters:   06/13/23 120/73   06/08/22 127/76   04/06/22 130/78    Wt Readings from Last 3 Encounters:   06/13/23 52.8 kg (116 lb 4.8 oz)   06/08/22 48.8 kg (107 lb 9.6 oz)   04/06/22 51.1 kg (112 lb 11.2 oz)                  Patient Active Problem List   Diagnosis     Postsurgical hypothyroidism     Giant papillary conjunctivitis     Cerebral gliosis     Cyst of pineal gland     BPV (benign positional vertigo), bilateral     Anxiety and depression     CARDIOVASCULAR SCREENING; LDL GOAL LESS THAN 160     Cervical high risk HPV (human papillomavirus) test positive     H/O scoliosis     Disorder of right temporomandibular joint     Menopausal syndrome (hot flashes)     Other insomnia     Tubular adenoma of colon     History of actinic keratosis     Former smoker     Iron deficiency anemia, unspecified iron deficiency anemia type     Other idiopathic scoliosis, thoracolumbar region     Family history of osteoporosis     Bee sting reaction, accidental or unintentional, sequela     Alcohol dependence (H)     Past Surgical History:   Procedure Laterality Date     BACK SURGERY      dimitris trivedi     COLONOSCOPY  4/2019     COLONOSCOPY  WITH CO2 INSUFFLATION N/A 2019    Procedure: COLONOSCOPY WITH CO2 INSUFFLATION;  Surgeon: Carol Toure MD;  Location: [facility] OR     ENT SURGERY      sinus     HEAD & NECK SURGERY      partial thyroidectomy       Social History     Tobacco Use     Smoking status: Former     Packs/day: 0.50     Years: 20.00     Pack years: 10.00     Types: Cigarettes     Start date: 1981     Quit date: 2/10/2014     Years since quittin.3     Smokeless tobacco: Never     Tobacco comments:     started smoking in ,  PPD     Quit in 2023   Vaping Use     Vaping status: Never Used   Substance Use Topics     Alcohol use: Yes     Comment: once a month     Family History   Problem Relation Age of Onset     Hypertension Mother      Substance Abuse Mother         Still Uses     Depression Father      Substance Abuse Father         Sober over 10 years     Cerebrovascular Disease Maternal Grandfather      Depression Paternal Grandfather      Hypertension Maternal Aunt      Glaucoma Maternal Aunt      Macular Degeneration Maternal Aunt      Cataracts Maternal Aunt      Hypertension Maternal Uncle      Hypertension Paternal Aunt      Diabetes Paternal Uncle      Hypertension Paternal Uncle      Cancer Other      Breast Cancer Cousin      Colon Cancer Other         6 out of 10-maternal     Thyroid Disease No family hx of          Current Outpatient Medications   Medication Sig Dispense Refill     B Complex Vitamins (B COMPLEX 1) TABS Take  by mouth daily.       Calcium-Magnesium-Vitamin D (CITRACAL CALCIUM+D PO) Take 1 tablet by mouth daily       EPINEPHrine (ANY BX GENERIC EQUIV) 0.3 MG/0.3ML injection 2-pack Inject 0.3 mLs (0.3 mg) into the muscle as needed for anaphylaxis 2 each 1     fluorouracil (EFUDEX) 5 % cream Use  Efudex twice daily for 2-3 weeks or until the onset of irritation. 40 g 0     gabapentin (NEURONTIN) 100 MG capsule Take 2 capsules (200 mg) by mouth 2 times daily Dose change 360 capsule 3      levothyroxine (SYNTHROID/LEVOTHROID) 75 MCG tablet Take 1 tablet (75 mcg) by mouth daily *Call clinic to schedule follow up appointment* 90 tablet 0     Multiple Vitamin CAPS Take  by mouth daily.       tazarotene (TAZORAC) 0.1 % external cream Apply a pea-sized amount nightly to face. Keep away from eyes. 30 g 3     Allergies   Allergen Reactions     Bees Swelling     Wasps [Hornets] Swelling     Hay Fever & [A.R.M.]      Recent Labs   Lab Test 06/12/23  1330 04/05/22  1236 01/26/22  0853 05/19/20  1259 04/10/19  1523 11/14/18  0847   LDL 66 79  --   --   --  106*    107  --   --   --  112   TRIG 54 149  --   --   --  49   ALT 26 43  --  34  --  26   CR 0.69 0.65  --  0.66  --  0.79   GFRESTIMATED >90 >90  --  >90  --  76   GFRESTBLACK  --   --   --  >90  --  >90   POTASSIUM 4.2 4.0  --  3.6  --  3.6   TSH 3.74 1.88   < > 1.01   < >  --     < > = values in this interval not displayed.        Breast Cancer Screening:    FHS-7:       2/3/2022     9:59 AM 4/5/2022     9:57 AM 6/7/2023     2:12 PM   Breast CA Risk Assessment (FHS-7)   Did any of your first-degree relatives have breast or ovarian cancer? No No No   Did any of your relatives have bilateral breast cancer? No No Yes   Did any man in your family have breast cancer? No No No   Did any woman in your family have breast and ovarian cancer? No Unknown No   Did any woman in your family have breast cancer before age 50 y? Yes Yes No   Do you have 2 or more relatives with breast and/or ovarian cancer? No Yes Yes   Do you have 2 or more relatives with breast and/or bowel cancer? No Yes Yes     click delete button to remove this line now  Mammogram Screening: Recommended mammography every 1-2 years with patient discussion and risk factor consideration  Pertinent mammograms are reviewed under the imaging tab.    History of abnormal Pap smear: YES - updated in Problem List and Health Maintenance accordingly      Latest Ref Rng & Units 4/6/2022     7:40 AM  2020    12:05 PM 2020    12:00 PM   PAP / HPV   PAP  Negative for Intraepithelial Lesion or Malignancy (NILM)       PAP (Historical)   NIL      HPV 16 DNA Negative Negative    Negative     HPV 18 DNA Negative Negative    Negative     Other HR HPV Negative Positive    Positive       Reviewed and updated as needed this visit by clinical staff   Tobacco  Allergies  Meds              Reviewed and updated as needed this visit by Provider   Tobacco                 Past Medical History:   Diagnosis Date     Actinic keratosis      Arthritis      Cervical high risk HPV (human papillomavirus) test positive 2017, 19    See problem list     Depressive disorder     Diminishing     History of blood transfusion      Spine Surgery     Thyroid disease     Partial Thyroidectomy      Past Surgical History:   Procedure Laterality Date     BACK SURGERY      shanks rods     COLONOSCOPY  2019     COLONOSCOPY WITH CO2 INSUFFLATION N/A 2019    Procedure: COLONOSCOPY WITH CO2 INSUFFLATION;  Surgeon: Carol Toure MD;  Location: [facility] OR     ENT SURGERY      sinus     HEAD & NECK SURGERY      partial thyroidectomy     OB History    Para Term  AB Living   0 0 0 0 0 0   SAB IAB Ectopic Multiple Live Births   0 0 0 0 0       Review of Systems   Constitutional: Negative for chills and fever.   HENT: Positive for congestion. Negative for ear pain, hearing loss and sore throat.    Eyes: Negative for pain and visual disturbance.   Respiratory: Positive for cough. Negative for shortness of breath.    Cardiovascular: Negative for chest pain, palpitations and peripheral edema.   Gastrointestinal: Negative for abdominal pain, constipation, diarrhea, heartburn, hematochezia and nausea.   Breasts:  Negative for tenderness, breast mass and discharge.   Genitourinary: Negative for dysuria, frequency, genital sores, hematuria, pelvic pain, urgency, vaginal bleeding and vaginal discharge.  "  Musculoskeletal: Positive for arthralgias and joint swelling. Negative for myalgias.   Skin: Negative for rash.   Neurological: Negative for dizziness, weakness, headaches and paresthesias.   Psychiatric/Behavioral: Negative for mood changes. The patient is not nervous/anxious.      CONSTITUTIONAL: NEGATIVE for fever, chills, change in weight  INTEGUMENTARY/SKIN: NEGATIVE for worrisome rashes, moles or lesions  EYES: NEGATIVE for vision changes or irritation  ENT: Nasal congestion and postnasal drip for the past 5 to 6 days with no concerns for fever, chills, cough, shortness of breath  RESP: NEGATIVE for significant cough or SOB  BREAST: NEGATIVE for masses, tenderness or discharge  CV: NEGATIVE for chest pain, palpitations or peripheral edema  GI: NEGATIVE for nausea, abdominal pain, heartburn, or change in bowel habits  : NEGATIVE for unusual urinary or vaginal symptoms. No vaginal bleeding.   menopausal female: Postmenopausal, history of abnormal Pap  MUSCULOSKELETAL: Left ankle pain for the last few days after straining it  NEURO: NEGATIVE for weakness, dizziness or paresthesias  ENDOCRINE: NEGATIVE for temperature intolerance, skin/hair changes  HEME/ALLERGY/IMMUNE: NEGATIVE for bleeding problems  PSYCHIATRIC: NEGATIVE for changes in mood or affect      OBJECTIVE:   /73 (BP Location: Right arm, Patient Position: Sitting, Cuff Size: Adult Regular)   Pulse 75   Temp 98.5  F (36.9  C) (Oral)   Resp 15   Ht 1.59 m (5' 2.6\")   Wt 52.8 kg (116 lb 4.8 oz)   SpO2 96%   BMI 20.87 kg/m    Physical Exam  GENERAL APPEARANCE: healthy, alert and no distress  EYES: Eyes grossly normal to inspection, PERRL and conjunctivae and sclerae normal  HENT: ear canals and TM's normal, nose and mouth without ulcers or lesions, oropharynx clear and oral mucous membranes moist  NECK: no adenopathy, no asymmetry, masses, or scars and thyroid normal to palpation  RESP: lungs clear to auscultation - no rales, rhonchi or " wheezes  BREAST: normal without masses, tenderness or nipple discharge and no palpable axillary masses or adenopathy  CV: regular rate and rhythm, normal S1 S2, no S3 or S4, no murmur, click or rub, no peripheral edema and peripheral pulses strong  ABDOMEN: soft, nontender, no hepatosplenomegaly, no masses and bowel sounds normal  MS: no musculoskeletal defects are noted and gait is age appropriate without ataxia  MS: Normal gait, normal left ankle exam-nontender, full range of motion, not red, not warm or swollen  SKIN: no suspicious lesions or rashes  NEURO: Normal strength and tone, sensory exam grossly normal, mentation intact and speech normal  PSYCH: mentation appears normal and affect normal/bright    Diagnostic Test Results:  Labs reviewed in Epic  Results for orders placed or performed in visit on 06/12/23 (from the past 24 hour(s))   Ferritin   Result Value Ref Range    Ferritin 66 11 - 328 ng/mL   Iron and iron binding capacity   Result Value Ref Range    Iron 117 37 - 145 ug/dL    Iron Binding Capacity 323 240 - 430 ug/dL    Iron Sat Index 36 15 - 46 %   Lipid panel reflex to direct LDL Fasting   Result Value Ref Range    Cholesterol 186 <200 mg/dL    Triglycerides 54 <150 mg/dL    Direct Measure  >=50 mg/dL    LDL Cholesterol Calculated 66 <=100 mg/dL    Non HDL Cholesterol 77 <130 mg/dL    Narrative    Cholesterol  Desirable:  <200 mg/dL    Triglycerides  Normal:  Less than 150 mg/dL  Borderline High:  150-199 mg/dL  High:  200-499 mg/dL  Very High:  Greater than or equal to 500 mg/dL    Direct Measure HDL  Female:  Greater than or equal to 50 mg/dL   Male:  Greater than or equal to 40 mg/dL    LDL Cholesterol  Desirable:  <100mg/dL  Above Desirable:  100-129 mg/dL   Borderline High:  130-159 mg/dL   High:  160-189 mg/dL   Very High:  >= 190 mg/dL    Non HDL Cholesterol  Desirable:  130 mg/dL  Above Desirable:  130-159 mg/dL  Borderline High:  160-189 mg/dL  High:  190-219 mg/dL  Very High:   Greater than or equal to 220 mg/dL   Comprehensive metabolic panel (BMP + Alb, Alk Phos, ALT, AST, Total. Bili, TP)   Result Value Ref Range    Sodium 141 136 - 145 mmol/L    Potassium 4.2 3.4 - 5.3 mmol/L    Chloride 104 98 - 107 mmol/L    Carbon Dioxide (CO2) 24 22 - 29 mmol/L    Anion Gap 13 7 - 15 mmol/L    Urea Nitrogen 12.2 8.0 - 23.0 mg/dL    Creatinine 0.69 0.51 - 0.95 mg/dL    Calcium 9.5 8.6 - 10.0 mg/dL    Glucose 98 70 - 99 mg/dL    Alkaline Phosphatase 110 (H) 35 - 104 U/L    AST 26 10 - 35 U/L    ALT 26 10 - 35 U/L    Protein Total 7.6 6.4 - 8.3 g/dL    Albumin 4.6 3.5 - 5.2 g/dL    Bilirubin Total 0.4 <=1.2 mg/dL    GFR Estimate >90 >60 mL/min/1.73m2   CBC with platelets   Result Value Ref Range    WBC Count 8.7 4.0 - 11.0 10e3/uL    RBC Count 4.47 3.80 - 5.20 10e6/uL    Hemoglobin 13.3 11.7 - 15.7 g/dL    Hematocrit 39.7 35.0 - 47.0 %    MCV 89 78 - 100 fL    MCH 29.8 26.5 - 33.0 pg    MCHC 33.5 31.5 - 36.5 g/dL    RDW 12.2 10.0 - 15.0 %    Platelet Count 293 150 - 450 10e3/uL       ASSESSMENT/PLAN:   (Z00.00) Routine general medical examination at a health care facility  (primary encounter diagnosis)  Comment:   Plan: Discussed on regular exercises, daily calcium intake, healthy eating, self breast exams monthly and routine dental checks    (N95.1) Menopausal syndrome (hot flashes)  Comment:   Plan: gabapentin (NEURONTIN) 100 MG capsule        Stable, continue with current medications, recheck in 1 year or sooner if needed    (G47.09) Other insomnia  Comment:   Plan: gabapentin (NEURONTIN) 100 MG capsule        as above    (R74.8) Elevated alkaline phosphatase level  Comment:   Plan: Alkaline phosphatase          Liver Function Studies - Recent Labs   Lab Test 06/12/23  1330   PROTTOTAL 7.6   ALBUMIN 4.6   BILITOTAL 0.4   ALKPHOS 110*   AST 26   ALT 26     Reviewed recent LFT showing normal liver functions except for slightly elevated alkaline phosphatase   patient does not have concerning signs and  symptoms for related obstruction at this time recommended to have a lab recheck while nonfasting in 1 to 2 weeks to see the trend.        (M25.572) Pain in joint, ankle and foot, left  Comment:   Plan: Orthopedic  Referral        Continue with ankle brace, local ice and heat, over-the-counter topical diclofenac, Biofreeze or IcyHot as needed  If no improvement noted in 2 to 3 weeks, patient will call to schedule for podiatry consult for further evaluation.javier        (Z12.4) Cervical cancer screening  Comment:   Plan: Patient found to the pelvic exam and speculum exam very painful  Recommended patient to get the pelvic and Pap from , who saw her for follow-up on the colposcopy for abnormal Pap  Patient verbalised understanding and is agreeable to the plan.      (R87.810) Cervical high risk HPV (human papillomavirus) test positive  Comment:   Plan: as above      (E89.0) Postsurgical hypothyroidism  Comment:   Plan: Recommended patient to continue to follow-up with  for her thyroid condition    .javier    (F10.21) Alcohol dependence in remission (H)  Comment:   Plan: Appreciated patient's efforts on being sober, continue to avoid alcohol    (Z87.2) History of actinic keratosis  Comment:   Plan: Continue with sunscreen use, dermatology follow-up as recommended    (D12.6) Tubular adenoma of colon  Comment:   Plan: Reviewed colonoscopy from 2019 , recommendation was to repeat in 5 years in 2024 due to history of polyps    (Z12.11) Colon cancer screening  Comment:   Plan: as above    (R09.81) Nasal congestion  Comment:   Plan: Likely underlying allergies,  Continue with current antihistamines, will use over-the-counter Flonase nasal sprays daily  RTC in 1week if no better by then or sooner prn.  Patient verbalised understanding and is agreeable to the plan.            COUNSELING:  Reviewed preventive health counseling, as reflected in patient instructions  Special attention given to:         Regular exercise       Healthy diet/nutrition       Vision screening       Immunizations    Vaccinated for: Pneumococcal             Osteoporosis prevention/bone health       Colorectal Cancer Screening       (Christine)menopause management       The ASCVD Risk score (Seamus DK, et al., 2019) failed to calculate for the following reasons:    The valid HDL cholesterol range is 20 to 100 mg/dL        She reports that she quit smoking about 9 years ago. Her smoking use included cigarettes. She started smoking about 42 years ago. She has a 10.00 pack-year smoking history. She has never used smokeless tobacco.      Chart documentation done in part with Dragon Voice recognition Software. Although reviewed after completion, some word and grammatical error may remain.  Ramakrishna Boyer MD  [facility]  Answers for HPI/ROS submitted by the patient on 6/13/2023  If you checked off any problems, how difficult have these problems made it for you to do your work, take care of things at home, or get along with other people?: Somewhat difficult  PHQ9 TOTAL SCORE: 4

## 2023-06-13 NOTE — NURSING NOTE
Prior to immunization administration, verified patients identity using patient s name and date of birth. Please see Immunization Activity for additional information. Yes    Screening Questionnaire for Adult Immunization    Are you sick today?   Yes   Do you have allergies to medications, food, a vaccine component or latex?   No   Have you ever had a serious reaction after receiving a vaccination?   No   Do you have a long-term health problem with heart, lung, kidney, or metabolic disease (e.g., diabetes), asthma, a blood disorder, no spleen, complement component deficiency, a cochlear implant, or a spinal fluid leak?  Are you on long-term aspirin therapy?   No   Do you have cancer, leukemia, HIV/AIDS, or any other immune system problem?   No   Do you have a parent, brother, or sister with an immune system problem?   No   In the past 3 months, have you taken medications that affect  your immune system, such as prednisone, other steroids, or anticancer drugs; drugs for the treatment of rheumatoid arthritis, Crohn s disease, or psoriasis; or have you had radiation treatments?   No   Have you had a seizure, or a brain or other nervous system problem?   No   During the past year, have you received a transfusion of blood or blood    products, or been given immune (gamma) globulin or antiviral drug?   No   For women: Are you pregnant or is there a chance you could become       pregnant during the next month?   No   Have you received any vaccinations in the past 4 weeks?   No     Immunization questionnaire was positive for at least one answer.  Notified [facility].      Injection of Prevnar 20 given by Diane L. Schoenherr, RN. Patient instructed to remain in clinic for 15 minutes afterwards, and to report any adverse reactions.     Screening performed by Diane L. Schoenherr, RN on 6/13/2023 at 11:45 AM.

## 2023-07-05 ENCOUNTER — LAB (OUTPATIENT)
Dept: LAB | Facility: CLINIC | Age: 58
End: 2023-07-05
Payer: COMMERCIAL

## 2023-07-05 DIAGNOSIS — R74.8 ELEVATED ALKALINE PHOSPHATASE LEVEL: ICD-10-CM

## 2023-07-05 LAB — ALP SERPL-CCNC: 87 U/L (ref 35–104)

## 2023-07-05 PROCEDURE — 84075 ASSAY ALKALINE PHOSPHATASE: CPT

## 2023-07-05 PROCEDURE — 36415 COLL VENOUS BLD VENIPUNCTURE: CPT

## 2023-07-19 ENCOUNTER — OFFICE VISIT (OUTPATIENT)
Dept: OBGYN | Facility: CLINIC | Age: 58
End: 2023-07-19
Payer: COMMERCIAL

## 2023-07-19 ENCOUNTER — ANCILLARY PROCEDURE (OUTPATIENT)
Dept: MAMMOGRAPHY | Facility: CLINIC | Age: 58
End: 2023-07-19
Attending: FAMILY MEDICINE
Payer: COMMERCIAL

## 2023-07-19 VITALS
DIASTOLIC BLOOD PRESSURE: 60 MMHG | SYSTOLIC BLOOD PRESSURE: 106 MMHG | HEIGHT: 62 IN | BODY MASS INDEX: 21.29 KG/M2 | HEART RATE: 59 BPM | OXYGEN SATURATION: 99 % | WEIGHT: 115.7 LBS

## 2023-07-19 DIAGNOSIS — Z12.31 VISIT FOR SCREENING MAMMOGRAM: ICD-10-CM

## 2023-07-19 DIAGNOSIS — R87.810 CERVICAL HIGH RISK HPV (HUMAN PAPILLOMAVIRUS) TEST POSITIVE: Primary | ICD-10-CM

## 2023-07-19 PROCEDURE — 87624 HPV HI-RISK TYP POOLED RSLT: CPT | Performed by: OBSTETRICS & GYNECOLOGY

## 2023-07-19 PROCEDURE — 99212 OFFICE O/P EST SF 10 MIN: CPT | Performed by: OBSTETRICS & GYNECOLOGY

## 2023-07-19 PROCEDURE — 77067 SCR MAMMO BI INCL CAD: CPT | Mod: GC | Performed by: STUDENT IN AN ORGANIZED HEALTH CARE EDUCATION/TRAINING PROGRAM

## 2023-07-19 PROCEDURE — 88175 CYTOPATH C/V AUTO FLUID REDO: CPT | Performed by: OBSTETRICS & GYNECOLOGY

## 2023-07-19 RX ORDER — HYDROCORTISONE 2.5 %
CREAM (GRAM) TOPICAL
COMMUNITY
Start: 2023-06-27

## 2023-07-19 RX ORDER — ZOSTER VACCINE RECOMBINANT, ADJUVANTED 50 MCG/0.5
KIT INTRAMUSCULAR
COMMUNITY
Start: 2022-10-25 | End: 2023-07-19

## 2023-07-19 RX ORDER — INFLUENZA A VIRUS A/NEBRASKA/14/2019 (H1N1) ANTIGEN (MDCK CELL DERIVED, PROPIOLACTONE INACTIVATED), INFLUENZA A VIRUS A/DELAWARE/39/2019 (H3N2) ANTIGEN (MDCK CELL DERIVED, PROPIOLACTONE INACTIVATED), INFLUENZA B VIRUS B/SINGAPORE/INFTT-16-0610/2016 ANTIGEN (MDCK CELL DERIVED, PROPIOLACTONE INACTIVATED), INFLUENZA B VIRUS B/DARWIN/7/2019 ANTIGEN (MDCK CELL DERIVED, PROPIOLACTONE INACTIVATED) 15; 15; 15; 15 UG/.5ML; UG/.5ML; UG/.5ML; UG/.5ML
INJECTION, SUSPENSION INTRAMUSCULAR
COMMUNITY
Start: 2022-10-25 | End: 2023-07-19

## 2023-07-19 NOTE — PROGRESS NOTES
"This 58 y/o postmenopausal female, , presents for a f/u pap since she is due for this.  Her last pap was normal on 2022 but the DNA marker test was + for \"other\" high-risk HPV subtypes.  She requests use of a pediatric-sized speculum and inspection for possible condyloma accuminata.  /60   Pulse 59   Ht 1.57 m (5' 1.81\")   Wt 52.5 kg (115 lb 11.2 oz)   SpO2 99%   BMI 21.29 kg/m    ROS:  10 systems were reviewed and the positives were listed under problems.  In the lithotomy position, a pediatric-sized bi-valve plastic speculum was placed and a co-test was obtained and submitted from the cervix.  No condyloma growths were noted on the cervix, vaginal mucosa, nor perineum.  She tolerated the exam well.  Assessment - repeat pap due to presence of \"other\" high-risk HPV subtypes per DNA marker test last year  Plan - Submit the pap which is diagnostic due to the presence of \"other\" high-risk HPV subtypes on her previous DNA marker test on 2022.  Follow up as discussed for next year.  20 minutes were spent today in chart review, the patient visit, review of tests, and documentation in regard to the issues noted above.  "

## 2023-07-19 NOTE — PATIENT INSTRUCTIONS
If you have any questions regarding your visit, Please contact your care team.    [facility]: [phone]    To Schedule an Appointment 24/7  Call: [phone]   DO. Sara Hawley -Surgery Scheduler  Aleksandra - Surgery Scheduler    JUDY Carrasquillo, JUDY Dobbs, JUDY   [place]  Wednesday and Friday  8:30 a.m-5:00 p.m  [place]-Temporary  Monday 8:30 a.m-5:00 p.m  Typical Surgery day:  Tuesday [facility]  [address] [place] [address] [phone] Phone  [phone] Fax    Imaging Scheduling-All Locations [phone]    [facility]  [address] [place] [address]     Urgent Care locations:  [facility] Monday-Friday   10 am - 8 pm  Saturday and Sunday   9 am - 5 pm [phone]   **Surgeries** Our Surgery Schedulers will contact you to schedule. If you do not receive a call within 3 business days, please call [phone].    [facility] Labor and Delivery:  [phone]    If you need a medication refill, please contact your pharmacy. Please allow 3 business days for your refill to be completed.  As always, Thank you for trusting us with your healthcare needs!  see additional instructions from your care team below

## 2023-07-24 LAB
BKR LAB AP GYN ADEQUACY: NORMAL
BKR LAB AP GYN INTERPRETATION: NORMAL
BKR LAB AP HPV REFLEX: NORMAL
BKR LAB AP PREVIOUS ABNL DX: NORMAL
BKR LAB AP PREVIOUS ABNORMAL: NORMAL
PATH REPORT.COMMENTS IMP SPEC: NORMAL
PATH REPORT.COMMENTS IMP SPEC: NORMAL
PATH REPORT.RELEVANT HX SPEC: NORMAL

## 2023-07-27 ENCOUNTER — PATIENT OUTREACH (OUTPATIENT)
Dept: OBGYN | Facility: CLINIC | Age: 58
End: 2023-07-27
Payer: COMMERCIAL

## 2023-09-13 DIAGNOSIS — E89.0 POSTSURGICAL HYPOTHYROIDISM: ICD-10-CM

## 2023-09-14 RX ORDER — LEVOTHYROXINE SODIUM 75 UG/1
75 TABLET ORAL DAILY
Qty: 90 TABLET | Refills: 0 | Status: SHIPPED | OUTPATIENT
Start: 2023-09-14 | End: 2023-10-06

## 2023-09-14 NOTE — TELEPHONE ENCOUNTER
9/14  Called and left voicemail, provided phone number [phone] to schedule a follow up appointment with Dr. Muir.     Danay ordoñez Procedure   Orthopedics, Podiatry, Sports Medicine, Ent ,Eye , Audiology, Adult Endocrine & Diabetes, Nutrition & Medication Therapy Management Specialties   [facility]

## 2023-09-20 NOTE — TELEPHONE ENCOUNTER
9/20 2nd attempt.   Called and left voicemail, provided phone number [phone] to schedule a follow up appointment with Dr. Muir.      Danay ordoñez Procedure   Orthopedics, Podiatry, Sports Medicine, Ent ,Eye , Audiology, Adult Endocrine & Diabetes, Nutrition & Medication Therapy Management Specialties   [facility]

## 2023-10-06 ENCOUNTER — MYC MEDICAL ADVICE (OUTPATIENT)
Dept: FAMILY MEDICINE | Facility: CLINIC | Age: 58
End: 2023-10-06
Payer: COMMERCIAL

## 2023-10-06 ENCOUNTER — TELEPHONE (OUTPATIENT)
Dept: ENDOCRINOLOGY | Facility: CLINIC | Age: 58
End: 2023-10-06
Payer: COMMERCIAL

## 2023-10-06 DIAGNOSIS — E89.0 POSTSURGICAL HYPOTHYROIDISM: ICD-10-CM

## 2023-10-09 RX ORDER — LEVOTHYROXINE SODIUM 75 UG/1
75 TABLET ORAL DAILY
Qty: 90 TABLET | Refills: 3 | Status: SHIPPED | OUTPATIENT
Start: 2023-10-09 | End: 2024-10-02

## 2024-01-18 ENCOUNTER — PATIENT OUTREACH (OUTPATIENT)
Dept: GASTROENTEROLOGY | Facility: CLINIC | Age: 59
End: 2024-01-18
Payer: COMMERCIAL

## 2024-01-18 DIAGNOSIS — Z12.11 SPECIAL SCREENING FOR MALIGNANT NEOPLASMS, COLON: Primary | ICD-10-CM

## 2024-01-18 NOTE — PROGRESS NOTES
"Patient has a history of polyps and surveillance colonoscopy is due April 2024. Patient meets criteria for CRC high risk standing order protocol.    CRC Screening Colonoscopy Referral Review    Patient meets the inclusion criteria for screening colonoscopy standing order.    Ordering/Referring Provider:  Ramakrishna Boyer MD    BMI: Estimated body mass index is 21.29 kg/m  as calculated from the following:    Height as of 7/19/23: 1.57 m (5' 1.81\").    Weight as of 7/19/23: 52.5 kg (115 lb 11.2 oz).     Sedation:  Does patient have any of the following conditions affecting sedation?  Hx of chemical dependency: MAC sedation recommended    Previous Scopes:  Any previous recommendations or follow up needs based on previous scope?  Sedation recommendations: MAC sedation rec'd by MD    Medical Concerns to Postpone Order:  Does patient have any of the following medical concerns that should postpone/delay colonoscopy referral?  No medical conditions affecting colonoscopy referral.    Final Referral Details:  Based on patient's medical history patient is appropriate for referral order with MAC/deep sedation.   BMI<= 45 45 < BMI <= 48 48 < BMI < = 50  BMI > 50   No Restrictions No [facility]  [facility]  [facility] with exceptions Hospital Only OR Only     "

## 2024-01-23 ENCOUNTER — TRANSFERRED RECORDS (OUTPATIENT)
Dept: HEALTH INFORMATION MANAGEMENT | Facility: CLINIC | Age: 59
End: 2024-01-23
Payer: COMMERCIAL

## 2024-01-23 LAB — PHQ9 SCORE: 11

## 2024-02-20 ENCOUNTER — TRANSFERRED RECORDS (OUTPATIENT)
Dept: HEALTH INFORMATION MANAGEMENT | Facility: CLINIC | Age: 59
End: 2024-02-20
Payer: COMMERCIAL

## 2024-03-20 ENCOUNTER — TRANSFERRED RECORDS (OUTPATIENT)
Dept: HEALTH INFORMATION MANAGEMENT | Facility: CLINIC | Age: 59
End: 2024-03-20
Payer: COMMERCIAL

## 2024-05-30 ENCOUNTER — TRANSFERRED RECORDS (OUTPATIENT)
Dept: HEALTH INFORMATION MANAGEMENT | Facility: CLINIC | Age: 59
End: 2024-05-30
Payer: COMMERCIAL

## 2024-06-05 ENCOUNTER — TRANSFERRED RECORDS (OUTPATIENT)
Dept: HEALTH INFORMATION MANAGEMENT | Facility: CLINIC | Age: 59
End: 2024-06-05
Payer: COMMERCIAL

## 2024-06-20 ENCOUNTER — TRANSFERRED RECORDS (OUTPATIENT)
Dept: HEALTH INFORMATION MANAGEMENT | Facility: CLINIC | Age: 59
End: 2024-06-20
Payer: COMMERCIAL

## 2024-07-02 ENCOUNTER — PATIENT OUTREACH (OUTPATIENT)
Dept: OBGYN | Facility: CLINIC | Age: 59
End: 2024-07-02
Payer: COMMERCIAL

## 2024-07-02 DIAGNOSIS — R87.810 CERVICAL HIGH RISK HPV (HUMAN PAPILLOMAVIRUS) TEST POSITIVE: Primary | ICD-10-CM

## 2024-08-03 ENCOUNTER — HEALTH MAINTENANCE LETTER (OUTPATIENT)
Age: 59
End: 2024-08-03

## 2024-09-05 NOTE — TELEPHONE ENCOUNTER
FYI to provider - Patient is lost to pap tracking follow-up. Attempts to contact pt have been made per reminder process and there has been no reply and/or no appt scheduled.       Viky Mcarthur RN, BSN    2006, 2007,2008, 2010 all NIL paps in Care Everywhere.  11/13/17 NIL pap, + HR HPV (not 16 or 18). Plan: cotest in 1 yr.  11/28/18 NIL pap, + HR HPV (not 16 or 18). Plan: colp  2/25/19 [facility]: Endocervical mucosa with microglandular hyperplasia. Plan: cotest in 6 mo.   9/4/19 [facility]- Negative for dysplasia.  NIL pap, + HR HPV (not 16 or 18). Plan cotest in 1 year.  9/23/20 Lost to follow-up for pap tracking  11/24/20 NIL pap, + HR HPV (not 16 or 18). Plan: cotest in 1 yr, due 11/24/21 4/6/22 NIL pap, + HR HPV (not 16 or 18). Plan: colp by 7/6/22 6/8/22 [facility] ECC: no PHYLICIA. Plan: cotest in 1 yr.   7/19/23 NIL pap, + HR HPV (not 16 or 18) Plan cotest in 1 year due 07/19/24 7/2/24 Reminder MyChart  7/30/24 Reminder call -- VM full  7/31/2024 Reminder call - left message  9/5/24 lost to follow up

## 2024-10-02 DIAGNOSIS — E89.0 POSTSURGICAL HYPOTHYROIDISM: ICD-10-CM

## 2024-10-02 RX ORDER — LEVOTHYROXINE SODIUM 75 UG/1
75 TABLET ORAL DAILY
Qty: 90 TABLET | Refills: 0 | Status: SHIPPED | OUTPATIENT
Start: 2024-10-02 | End: 2024-11-06

## 2024-10-02 NOTE — TELEPHONE ENCOUNTER
LVM for pt that RX was sent to pharmacy, however the pt needs to schedule a fasting lab previsit and a OV as she is overdue.  Once completed please route to PCP.

## 2024-10-07 ENCOUNTER — TELEPHONE (OUTPATIENT)
Dept: GASTROENTEROLOGY | Facility: CLINIC | Age: 59
End: 2024-10-07
Payer: COMMERCIAL

## 2024-10-07 NOTE — TELEPHONE ENCOUNTER
"Pre Assessment RN Review    Focused Assessments      BONNIE Hx  Estimated body mass index is 21.29 kg/m  as calculated from the following:    Height as of 7/19/23: 1.57 m (5' 1.81\").    Weight as of 7/19/23: 52.5 kg (115 lb 11.2 oz).     Patient has reported / documented history BONNIE and reports she does not use a a device for sleep.     Device: N/A    Severity Assessment    Sleep Study:   Diagnosis/Severity: Mild    AHI: 14.8          Scheduling Status & Recommendations    Sedation: MAC/Deep Sedation - Per order.  Location Type: ASC - Per RN assessment.   "

## 2024-10-07 NOTE — TELEPHONE ENCOUNTER
"Endoscopy Scheduling Screen    Have you had any respiratory illness or flu-like symptoms in the last 10 days?  No    What is your communication preference for Instructions and/or Bowel Prep?   MyChart    What insurance is in the chart?  Other:  bcbs     Ordering/Referring Provider:     JENNYFER SALAZAR       (If ordering provider performs procedure, schedule with ordering provider unless otherwise instructed. )    BMI: Estimated body mass index is 21.29 kg/m  as calculated from the following:    Height as of 7/19/23: 1.57 m (5' 1.81\").    Weight as of 7/19/23: 52.5 kg (115 lb 11.2 oz).     Sedation Ordered  MAC/deep sedation.   BMI<= 45 45 < BMI <= 48 48 < BMI < = 50  BMI > 50   No Restrictions No [facility]  No [facility]  [facility] with exceptions Hospital Only OR Only       Do you have a history of malignant hyperthermia?  No    (Females) Are you currently pregnant?   No     Have you been diagnosed or told you have pulmonary hypertension?   No    Do you have an LVAD?  No    Have you been told you have moderate to severe sleep apnea?  Yes. Do you use a CPAP? No. (RN Review required for scheduling unless scheduling in Hospital.)     Have you been told you have COPD, asthma, or any other lung disease?  No    Do you have any heart conditions?  No     Have you ever had or are you waiting for an organ transplant?  No. Continue scheduling, no site restrictions.    Have you had a stroke or transient ischemic attack (TIA aka \"mini stroke\" in the last 6 months?   No    Have you been diagnosed with or been told you have cirrhosis of the liver?   No.    Are you currently on dialysis?   No    Do you need assistance transferring?   No    BMI: Estimated body mass index is 21.29 kg/m  as calculated from the following:    Height as of 7/19/23: 1.57 m (5' 1.81\").    Weight as of 7/19/23: 52.5 kg (115 lb 11.2 oz).     Is patients BMI > 40 and scheduling location [facility]?  No    Do you take an injectable or oral medication for weight loss " or diabetes (excluding insulin)?  No    Do you take the medication Naltrexone?  No    Do you take blood thinners?  No       Prep   Are you currently on dialysis or do you have chronic kidney disease?  No    Do you have a diagnosis of diabetes?  No    Do you have a diagnosis of cystic fibrosis (CF)?  No    On a regular basis do you go 3 -5 days between bowel movements?  No    BMI > 40?  No    Preferred Pharmacy:      [id number] - [place], [place] - [address] AT SEC OF [facility] &  ( MAIN)  [address]  [place] [address]  Phone: [phone] Fax: [phone]      Final Scheduling Details     Procedure scheduled  Colonoscopy    Surgeon:  Jayro     Date of procedure:  12/03/2024     Pre-OP / PAC:   No - Not required for this site.    Location  PH - Per order.    Sedation   MAC/Deep Sedation - Per order.      Patient Reminders:   You will receive a call from a Nurse to review instructions and health history.  This assessment must be completed prior to your procedure.  Failure to complete the Nurse assessment may result in the procedure being cancelled.      On the day of your procedure, please designate an adult(s) who can drive you home stay with you for the next 24 hours. The medicines used in the exam will make you sleepy. You will not be able to drive.      You cannot take public transportation, ride share services, or non-medical taxi service without a responsible caregiver.  Medical transport services are allowed with the requirement that a responsible caregiver will receive you at your destination.  We require that drivers and caregivers are confirmed prior to your procedure.

## 2024-10-12 ENCOUNTER — HEALTH MAINTENANCE LETTER (OUTPATIENT)
Age: 59
End: 2024-10-12

## 2024-11-05 ENCOUNTER — LAB (OUTPATIENT)
Dept: LAB | Facility: CLINIC | Age: 59
End: 2024-11-05
Payer: COMMERCIAL

## 2024-11-05 DIAGNOSIS — E89.0 POSTSURGICAL HYPOTHYROIDISM: ICD-10-CM

## 2024-11-05 DIAGNOSIS — Z13.1 SCREENING FOR DIABETES MELLITUS (DM): ICD-10-CM

## 2024-11-05 DIAGNOSIS — Z13.220 SCREENING FOR HYPERLIPIDEMIA: ICD-10-CM

## 2024-11-05 DIAGNOSIS — Z13.0 SCREENING FOR DEFICIENCY ANEMIA: ICD-10-CM

## 2024-11-05 LAB
ALBUMIN SERPL BCG-MCNC: 4.5 G/DL (ref 3.5–5.2)
ALP SERPL-CCNC: 111 U/L (ref 40–150)
ALT SERPL W P-5'-P-CCNC: 49 U/L (ref 0–50)
ANION GAP SERPL CALCULATED.3IONS-SCNC: 12 MMOL/L (ref 7–15)
AST SERPL W P-5'-P-CCNC: 29 U/L (ref 0–45)
BILIRUB SERPL-MCNC: 0.6 MG/DL
BUN SERPL-MCNC: 11.7 MG/DL (ref 8–23)
CALCIUM SERPL-MCNC: 9.6 MG/DL (ref 8.8–10.4)
CHLORIDE SERPL-SCNC: 101 MMOL/L (ref 98–107)
CHOLEST SERPL-MCNC: 257 MG/DL
CREAT SERPL-MCNC: 0.67 MG/DL (ref 0.51–0.95)
EGFRCR SERPLBLD CKD-EPI 2021: >90 ML/MIN/1.73M2
ERYTHROCYTE [DISTWIDTH] IN BLOOD BY AUTOMATED COUNT: 13 % (ref 10–15)
FASTING STATUS PATIENT QL REPORTED: YES
FASTING STATUS PATIENT QL REPORTED: YES
GLUCOSE SERPL-MCNC: 84 MG/DL (ref 70–99)
HCO3 SERPL-SCNC: 24 MMOL/L (ref 22–29)
HCT VFR BLD AUTO: 40.5 % (ref 35–47)
HDLC SERPL-MCNC: 125 MG/DL
HGB BLD-MCNC: 13.6 G/DL (ref 11.7–15.7)
LDLC SERPL CALC-MCNC: 121 MG/DL
MCH RBC QN AUTO: 30.5 PG (ref 26.5–33)
MCHC RBC AUTO-ENTMCNC: 33.6 G/DL (ref 31.5–36.5)
MCV RBC AUTO: 91 FL (ref 78–100)
NONHDLC SERPL-MCNC: 132 MG/DL
PLATELET # BLD AUTO: 287 10E3/UL (ref 150–450)
POTASSIUM SERPL-SCNC: 3.7 MMOL/L (ref 3.4–5.3)
PROT SERPL-MCNC: 7.4 G/DL (ref 6.4–8.3)
RBC # BLD AUTO: 4.46 10E6/UL (ref 3.8–5.2)
SODIUM SERPL-SCNC: 137 MMOL/L (ref 135–145)
TRIGL SERPL-MCNC: 55 MG/DL
TSH SERPL DL<=0.005 MIU/L-ACNC: 2 UIU/ML (ref 0.3–4.2)
WBC # BLD AUTO: 8.9 10E3/UL (ref 4–11)

## 2024-11-05 PROCEDURE — 85027 COMPLETE CBC AUTOMATED: CPT

## 2024-11-05 PROCEDURE — 80061 LIPID PANEL: CPT

## 2024-11-05 PROCEDURE — 80053 COMPREHEN METABOLIC PANEL: CPT

## 2024-11-05 PROCEDURE — 84443 ASSAY THYROID STIM HORMONE: CPT

## 2024-11-05 PROCEDURE — 36415 COLL VENOUS BLD VENIPUNCTURE: CPT

## 2024-11-06 ENCOUNTER — VIRTUAL VISIT (OUTPATIENT)
Dept: FAMILY MEDICINE | Facility: CLINIC | Age: 59
End: 2024-11-06
Payer: COMMERCIAL

## 2024-11-06 DIAGNOSIS — N95.1 MENOPAUSAL SYNDROME (HOT FLASHES): ICD-10-CM

## 2024-11-06 DIAGNOSIS — E89.0 POSTSURGICAL HYPOTHYROIDISM: Primary | ICD-10-CM

## 2024-11-06 DIAGNOSIS — F32.A ANXIETY AND DEPRESSION: ICD-10-CM

## 2024-11-06 DIAGNOSIS — F41.9 ANXIETY AND DEPRESSION: ICD-10-CM

## 2024-11-06 DIAGNOSIS — G47.09 OTHER INSOMNIA: ICD-10-CM

## 2024-11-06 PROCEDURE — G2211 COMPLEX E/M VISIT ADD ON: HCPCS | Mod: 95 | Performed by: FAMILY MEDICINE

## 2024-11-06 PROCEDURE — 99214 OFFICE O/P EST MOD 30 MIN: CPT | Mod: 95 | Performed by: FAMILY MEDICINE

## 2024-11-06 RX ORDER — VENLAFAXINE HYDROCHLORIDE 150 MG/1
150 CAPSULE, EXTENDED RELEASE ORAL DAILY
COMMUNITY

## 2024-11-06 RX ORDER — MIRTAZAPINE 15 MG/1
15 TABLET, FILM COATED ORAL
Qty: 90 TABLET | Refills: 0 | Status: SHIPPED | OUTPATIENT
Start: 2024-11-06

## 2024-11-06 RX ORDER — GABAPENTIN 100 MG/1
200 CAPSULE ORAL 2 TIMES DAILY
Qty: 360 CAPSULE | Refills: 3 | Status: SHIPPED | OUTPATIENT
Start: 2024-11-06

## 2024-11-06 RX ORDER — LEVOTHYROXINE SODIUM 75 UG/1
75 TABLET ORAL DAILY
Qty: 90 TABLET | Refills: 3 | Status: SHIPPED | OUTPATIENT
Start: 2024-11-06

## 2024-11-06 NOTE — PROGRESS NOTES
"  If patient has telephone visit, have they been educated on video visit as preferred visit method and offered to change to video visit? N/A        Instructions Relayed to Patient by Virtual Roomer:     Patient is active on [employer]:   Relayed following to patient: \"It looks like you are active on [employer], are you able to join the visit this way? If not, do you need us to send you a link now or would you like your provider to send a link via text or email when they are ready to initiate the visit?\"      Patient Confirmed they will join visit via: Text Link to Cell Phone  Reminded patient to ensure they were logged on to virtual visit by arrival time listed.   Asked if patient has flexibility to initiate visit sooner than arrival time: patient is unable to initiate visit earlier than arrival time     If pediatric virtual visit, ensured pediatric patient along with parent/guardian will be present for video visit.     Patient offered the website [email or web address] and/or phone number to [employer] Help line: [phone]    Negar is a 59 year old who is being evaluated via a billable video visit.    How would you like to obtain your AVS? [employer]  If the video visit is dropped, the invitation should be resent by: Text to cell phone: [phone]  Will anyone else be joining your video visit? No      Assessment & Plan     Postsurgical hypothyroidism  TSH   Date Value Ref Range Status   11/05/2024 2.00 0.30 - 4.20 uIU/mL Final   06/12/2023 3.74 0.30 - 4.20 uIU/mL Final   04/05/2022 1.88 0.40 - 4.00 mU/L Final   01/26/2022 1.94 0.40 - 4.00 mU/L Final   05/19/2020 1.01 0.40 - 4.00 mU/L Final   04/10/2019 1.12 0.40 - 4.00 mU/L Final   02/06/2018 1.71 0.40 - 4.00 mU/L Final   01/10/2017 3.03 0.40 - 4.00 mU/L Final   01/06/2016 2.30 0.40 - 4.00 mU/L Final     Reviewed normal thyroid labs, continue current dose of levothyroxine, recheck in 1 year or sooner if needed  - levothyroxine (SYNTHROID/LEVOTHROID) 75 MCG " tablet; Take 1 tablet (75 mcg) by mouth daily.    Other insomnia  Continue current dose of gabapentin to help with insomnia and hot flashes.  Per patient's request, prescription sent for Remeron 15 mg to take as needed on the days when she needs additional help with insomnia  Patient has previous refill that she has been using sparingly  - gabapentin (NEURONTIN) 100 MG capsule; Take 2 capsules (200 mg) by mouth 2 times daily. Dose change  - mirtazapine (REMERON) 15 MG tablet; Take 1 tablet (15 mg) by mouth nightly as needed (sleep).    Menopausal syndrome (hot flashes)  Stable, continue current medications, patient is also taking Effexor 150 mg daily from her psychiatrist at [facility] [place] for chronic anxiety and depression  - gabapentin (NEURONTIN) 100 MG capsule; Take 2 capsules (200 mg) by mouth 2 times daily. Dose change    Anxiety and depression  as above                  Ronda Bills is a 59 year old, presenting for the following health issues:  Patient is here for a video visit instead of in person visit due to the current COVID-19 pandemic.    Recheck Medication (Updates) and Results (Labs)      11/6/2024     1:11 PM   Additional Questions   Roomed by Libertad ALBERTO   Accompanied by Self         11/6/2024     1:11 PM   Patient Reported Additional Medications   Patient reports taking the following new medications None     History of Present Illness       Reason for visit:  Med review    She eats 2-3 servings of fruits and vegetables daily.She consumes 1 sweetened beverage(s) daily.She exercises with enough effort to increase her heart rate 20 to 29 minutes per day.  She exercises with enough effort to increase her heart rate 3 or less days per week.   She is taking medications regularly.       Hypothyroidism Follow-up    Since last visit, patient describes the following symptoms: Weight stable, no hair loss, no skin changes, no constipation, no loose stools      Medication Followup of gabapentin for hot  "flashes and to help sleep  Taking Medication as prescribed: yes  Side Effects:  None  Medication Helping Symptoms:  yes        Review of Systems  CONSTITUTIONAL: NEGATIVE for fever, chills, change in weight  RESP: NEGATIVE for significant cough or SOB  CV: NEGATIVE for chest pain, palpitations or peripheral edema  ENDOCRINE: History of hypothyroidism  HEME/ALLERGY/IMMUNE: History of hot flashes  PSYCHIATRIC: History of anxiety, depression, insomnia      Objective           Vitals:  No vitals were obtained today due to virtual visit.    Physical Exam   GENERAL: alert and no distress  EYES: Eyes grossly normal to inspection  RESP: No audible wheeze, cough, or visible cyanosis.    NEURO: Cranial nerves grossly intact.  Mentation and speech appropriate for age.  PSYCH: Appropriate affect, tone, and pace of words    Lab Results   Component Value Date    WBC 8.9 11/05/2024    WBC 8.2 05/20/2021     Lab Results   Component Value Date    RBC 4.46 11/05/2024    RBC 4.36 05/20/2021     Lab Results   Component Value Date    HGB 13.6 11/05/2024    HGB 13.2 05/20/2021     Lab Results   Component Value Date    HCT 40.5 11/05/2024    HCT 39.9 05/20/2021     No components found for: \"MCT\"  Lab Results   Component Value Date    MCV 91 11/05/2024    MCV 92 05/20/2021     Lab Results   Component Value Date    MCH 30.5 11/05/2024    MCH 30.3 05/20/2021     Lab Results   Component Value Date    MCHC 33.6 11/05/2024    MCHC 33.1 05/20/2021     Lab Results   Component Value Date    RDW 13.0 11/05/2024    RDW 12.7 05/20/2021     Lab Results   Component Value Date     11/05/2024     05/20/2021     Last Comprehensive Metabolic Panel:  Sodium   Date Value Ref Range Status   11/05/2024 137 135 - 145 mmol/L Final   05/19/2020 138 133 - 144 mmol/L Final     Potassium   Date Value Ref Range Status   11/05/2024 3.7 3.4 - 5.3 mmol/L Final   04/05/2022 4.0 3.4 - 5.3 mmol/L Final   05/19/2020 3.6 3.4 - 5.3 mmol/L Final     Chloride "   Date Value Ref Range Status   11/05/2024 101 98 - 107 mmol/L Final   04/05/2022 105 94 - 109 mmol/L Final   05/19/2020 107 94 - 109 mmol/L Final     Carbon Dioxide   Date Value Ref Range Status   05/19/2020 27 20 - 32 mmol/L Final     Carbon Dioxide (CO2)   Date Value Ref Range Status   11/05/2024 24 22 - 29 mmol/L Final   04/05/2022 29 20 - 32 mmol/L Final     Anion Gap   Date Value Ref Range Status   11/05/2024 12 7 - 15 mmol/L Final   04/05/2022 6 3 - 14 mmol/L Final   05/19/2020 4 3 - 14 mmol/L Final     Glucose   Date Value Ref Range Status   11/05/2024 84 70 - 99 mg/dL Final   04/05/2022 96 70 - 99 mg/dL Final   05/19/2020 99 70 - 99 mg/dL Final     Comment:     Non Fasting     Urea Nitrogen   Date Value Ref Range Status   11/05/2024 11.7 8.0 - 23.0 mg/dL Final   04/05/2022 8 7 - 30 mg/dL Final   05/19/2020 16 7 - 30 mg/dL Final     Creatinine   Date Value Ref Range Status   11/05/2024 0.67 0.51 - 0.95 mg/dL Final   05/19/2020 0.66 0.52 - 1.04 mg/dL Final     GFR Estimate   Date Value Ref Range Status   11/05/2024 >90 >60 mL/min/1.73m2 Final     Comment:     eGFR calculated using 2021 CKD-EPI equation.   05/19/2020 >90 >60 mL/min/[1.73_m2] Final     Comment:     Non  GFR Calc  Starting 12/18/2018, serum creatinine based estimated GFR (eGFR) will be   calculated using the Chronic Kidney Disease Epidemiology Collaboration   (CKD-EPI) equation.       Calcium   Date Value Ref Range Status   11/05/2024 9.6 8.8 - 10.4 mg/dL Final     Comment:     Reference intervals for this test were updated on 7/16/2024 to reflect our healthy population more accurately. There may be differences in the flagging of prior results with similar values performed with this method. Those prior results can be interpreted in the context of the updated reference intervals.   05/19/2020 8.8 8.5 - 10.1 mg/dL Final     Bilirubin Total   Date Value Ref Range Status   11/05/2024 0.6 <=1.2 mg/dL Final   05/19/2020 0.3 0.2 - 1.3  "mg/dL Final     Alkaline Phosphatase   Date Value Ref Range Status   11/05/2024 111 40 - 150 U/L Final   05/19/2020 94 40 - 150 U/L Final     ALT   Date Value Ref Range Status   11/05/2024 49 0 - 50 U/L Final   05/19/2020 34 0 - 50 U/L Final     AST   Date Value Ref Range Status   11/05/2024 29 0 - 45 U/L Final   05/19/2020 20 0 - 45 U/L Final               Lab Results   Component Value Date    CHOL 257 11/05/2024    CHOL 228 11/14/2018     Lab Results   Component Value Date     11/05/2024     11/14/2018     Lab Results   Component Value Date     11/05/2024     11/14/2018     Lab Results   Component Value Date    TRIG 55 11/05/2024    TRIG 49 11/14/2018     No results found for: \"CHOLHDLRATIO\"  TSH   Date Value Ref Range Status   11/05/2024 2.00 0.30 - 4.20 uIU/mL Final   06/12/2023 3.74 0.30 - 4.20 uIU/mL Final   04/05/2022 1.88 0.40 - 4.00 mU/L Final   01/26/2022 1.94 0.40 - 4.00 mU/L Final   05/19/2020 1.01 0.40 - 4.00 mU/L Final   04/10/2019 1.12 0.40 - 4.00 mU/L Final   02/06/2018 1.71 0.40 - 4.00 mU/L Final   01/10/2017 3.03 0.40 - 4.00 mU/L Final   01/06/2016 2.30 0.40 - 4.00 mU/L Final           Video-Visit Details    Type of service:  Video Visit   Originating Location (pt. Location): Home    Distant Location (provider location):  Off-site  Platform used for Video Visit: Dominique  Signed Electronically by: Ramakrishna Boyer MD    "

## 2024-11-11 NOTE — PATIENT INSTRUCTIONS
If you have labs or imaging done, the results will automatically release in N-1-1 without an interpretation.  Your health care professional will review those results and send an interpretation with recommendations as soon as possible, but this may be 1-3 business days.    If you have any questions regarding your visit, please contact your care team.     [facility]: [phone]  [facility] CLINIC HOURS TELEPHONE NUMBER   DO. Sara Hawley -Surgery Scheduler  Aleksandra -     JUDY Carrasquillo RN Kylie, RN [place]    Monday 8:30 am-5:00 pm  Wednesday 8:30 am-5:00 pm  Friday 8:30 am-5:00 pm    Typical Surgery day: Tuesday [facility]  [address] [place] [address]  Phone:  [phone]  Fax: [phone]   Appointment [phone]    Imaging Scheduling-All Locations [phone]    [facility] Labor and Delivery  [address] [place] [address] [place] [address] [phone]   **Surgeries** Our Surgery Schedulers will contact you to schedule. If you do not receive a call within 3 business days, please call [phone].  Urgent Care locations:  [facility] Monday-Friday   10 am - 8 pm  Saturday and    9 am - 5 pm [phone]   If you need a medication refill, please contact your pharmacy. Please allow 3 business days for your refill to be completed.  As always, Thank you for trusting us with your healthcare needs!  see additional instructions from your care team below

## 2024-11-12 ENCOUNTER — ANCILLARY PROCEDURE (OUTPATIENT)
Dept: MAMMOGRAPHY | Facility: CLINIC | Age: 59
End: 2024-11-12
Attending: FAMILY MEDICINE
Payer: COMMERCIAL

## 2024-11-12 ENCOUNTER — OFFICE VISIT (OUTPATIENT)
Dept: OPTOMETRY | Facility: CLINIC | Age: 59
End: 2024-11-12
Payer: COMMERCIAL

## 2024-11-12 DIAGNOSIS — Z12.31 VISIT FOR SCREENING MAMMOGRAM: ICD-10-CM

## 2024-11-12 DIAGNOSIS — Z01.00 EXAMINATION OF EYES AND VISION: Primary | ICD-10-CM

## 2024-11-12 DIAGNOSIS — H10.13 ALLERGIC CONJUNCTIVITIS OF BOTH EYES: ICD-10-CM

## 2024-11-12 DIAGNOSIS — H52.4 PRESBYOPIA: ICD-10-CM

## 2024-11-12 DIAGNOSIS — H52.223 REGULAR ASTIGMATISM OF BOTH EYES: ICD-10-CM

## 2024-11-12 DIAGNOSIS — H52.13 MYOPIA OF BOTH EYES: ICD-10-CM

## 2024-11-12 PROCEDURE — 77063 BREAST TOMOSYNTHESIS BI: CPT | Mod: GC

## 2024-11-12 PROCEDURE — 92014 COMPRE OPH EXAM EST PT 1/>: CPT | Performed by: OPTOMETRIST

## 2024-11-12 PROCEDURE — 77067 SCR MAMMO BI INCL CAD: CPT | Mod: GC

## 2024-11-12 PROCEDURE — 92015 DETERMINE REFRACTIVE STATE: CPT | Performed by: OPTOMETRIST

## 2024-11-12 PROCEDURE — 92310 CONTACT LENS FITTING OU: CPT | Mod: GA | Performed by: OPTOMETRIST

## 2024-11-12 ASSESSMENT — REFRACTION_CURRENTRX
OD_DIAMETER: 13.8
OS_BASECURVE: 8.40
OS_BRAND: AIR OPTIX NIGHT AND DAY
OD_BRAND: AIR OPTIX NIGHT AND DAY
OD_SPHERE: -3.00
OS_SPHERE: -4.00
OS_DIAMETER: 13.8
OD_BASECURVE: 8.40

## 2024-11-12 ASSESSMENT — VISUAL ACUITY
OD_CC+: -2
METHOD: SNELLEN - LINEAR
OS_CC: 20/70
OD_CC: 20/30
OS_CC: 20/25
CORRECTION_TYPE: CONTACTS
OD_CC: 20/40

## 2024-11-12 ASSESSMENT — REFRACTION_WEARINGRX
OS_SPHERE: -4.25
OD_AXIS: 005
OD_CYLINDER: +0.75
OD_ADD: +2.25
SPECS_TYPE: DIDNT BRING
OD_SPHERE: -5.25
OS_AXIS: 180
OS_ADD: +2.25
OS_CYLINDER: +0.25

## 2024-11-12 ASSESSMENT — KERATOMETRY
OD_AXISANGLE_DEGREES: 014
OS_K1POWER_DIOPTERS: 44.25
OD_K1POWER_DIOPTERS: 43.75
OD_K2POWER_DIOPTERS: 44.00
OS_K2POWER_DIOPTERS: 44.75
METHOD_AUTO_MANUAL: AUTOMATED
OD_AXISANGLE2_DEGREES: 104
OS_AXISANGLE_DEGREES: 127
OS_AXISANGLE2_DEGREES: 037

## 2024-11-12 ASSESSMENT — REFRACTION_MANIFEST
OS_ADD: +2.25
OD_ADD: +2.25
OD_CYLINDER: +0.75
OS_CYLINDER: +0.25
OS_AXIS: 180
OD_AXIS: 005
OD_SPHERE: -5.00
OS_SPHERE: -4.25

## 2024-11-12 ASSESSMENT — CONF VISUAL FIELD
OD_INFERIOR_NASAL_RESTRICTION: 0
OS_SUPERIOR_NASAL_RESTRICTION: 0
OS_SUPERIOR_TEMPORAL_RESTRICTION: 0
OD_NORMAL: 1
OD_SUPERIOR_TEMPORAL_RESTRICTION: 0
OS_INFERIOR_NASAL_RESTRICTION: 0
OS_INFERIOR_TEMPORAL_RESTRICTION: 0
OD_INFERIOR_TEMPORAL_RESTRICTION: 0
OD_SUPERIOR_NASAL_RESTRICTION: 0
OS_NORMAL: 1

## 2024-11-12 ASSESSMENT — EXTERNAL EXAM - RIGHT EYE: OD_EXAM: NORMAL

## 2024-11-12 ASSESSMENT — CUP TO DISC RATIO
OS_RATIO: 0.25
OD_RATIO: 0.25

## 2024-11-12 ASSESSMENT — EXTERNAL EXAM - LEFT EYE: OS_EXAM: NORMAL

## 2024-11-12 ASSESSMENT — SLIT LAMP EXAM - LIDS
COMMENTS: NORMAL
COMMENTS: NORMAL

## 2024-11-12 ASSESSMENT — TONOMETRY
OD_IOP_MMHG: 14.7
IOP_METHOD: ICARE
OS_IOP_MMHG: 16.2

## 2024-11-12 NOTE — PROGRESS NOTES
Chief Complaint   Patient presents with    Annual Eye Exam     More contacts fit fee $75 ok        Previous contact lens wearer? Yes: air optix night and day  Comfort of contact lenses :good  Satisfied with current lenses: Yes  Right eye near, left eye distance    Last Eye Exam: 3-9-2022  Dilated Previously: Yes    What are you currently using to see?  glasses and contacts    Distance Vision Acuity: Satisfied with vision    Near Vision Acuity: Not satisfied, monovision not as good    Eye Comfort: itchy  Do you use eye drops? 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    Deidra Parker Optometric Assistant, A.B.O.C.     Medical, surgical and family histories reviewed and updated 11/12/2024.       OBJECTIVE: See Ophthalmology exam    ASSESSMENT:    ICD-10-CM    1. Examination of eyes and vision  Z01.00 EYE EXAM (SIMPLE-NONBILLABLE)      2. Myopia of both eyes  H52.13 REFRACTION     CONTACT LENS FITTING,BILAT w/ signed waiver      3. Regular astigmatism of both eyes  H52.223 REFRACTION      4. Presbyopia  H52.4 REFRACTION     CONTACT LENS FITTING,BILAT w/ signed waiver      5. Allergic conjunctivitis of both eyes  H10.13          PLAN:     Patient Instructions   Eyeglass prescription given.    Contact lens prescription given and form signed.    I recommend taking the lenses out every night.  Sleeping in them increases your chance of eye infection and loss of vision.     OTC Pataday or Lastacaft to be used once daily for itchy eyes.  Use as needed.    Return in 1 year for a complete eye exam or sooner if needed.    Hi Daily, OD

## 2024-11-12 NOTE — LETTER
11/12/2024      Cynthia M Barthel  [address] [place] [address]      Dear Colleague,    Thank you for referring your patient, Cynthia M Barthel, to the [facility]. Please see a copy of my visit note below.    Chief Complaint   Patient presents with     Annual Eye Exam     More contacts fit fee $75 ok        Previous contact lens wearer? Yes: air optix night and day  Comfort of contact lenses :good  Satisfied with current lenses: Yes  Right eye near, left eye distance    Last Eye Exam: 3-9-2022  Dilated Previously: Yes    What are you currently using to see?  glasses and contacts    Distance Vision Acuity: Satisfied with vision    Near Vision Acuity: Not satisfied, monovision not as good    Eye Comfort: itchy  Do you use eye drops? 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    Deidra Parker Optometric Assistant, A.B.O.C.     Medical, surgical and family histories reviewed and updated 11/12/2024.       OBJECTIVE: See Ophthalmology exam    ASSESSMENT:    ICD-10-CM    1. Examination of eyes and vision  Z01.00 EYE EXAM (SIMPLE-NONBILLABLE)      2. Myopia of both eyes  H52.13 REFRACTION     CONTACT LENS FITTING,BILAT w/ signed waiver      3. Regular astigmatism of both eyes  H52.223 REFRACTION      4. Presbyopia  H52.4 REFRACTION     CONTACT LENS FITTING,BILAT w/ signed waiver      5. Allergic conjunctivitis of both eyes  H10.13          PLAN:     Patient Instructions   Eyeglass prescription given.    Contact lens prescription given and form signed.    I recommend taking the lenses out every night.  Sleeping in them increases your chance of eye infection and loss of vision.     OTC Pataday or Lastacaft to be used once daily for itchy eyes.  Use as needed.    Return in 1 year for a complete eye exam or sooner if needed.    Hi Daily, OD                           Again, thank you  for allowing me to participate in the care of your patient.        Sincerely,        Hi Daily OD

## 2024-11-12 NOTE — PATIENT INSTRUCTIONS
Eyeglass prescription given.    Contact lens prescription given and form signed.    I recommend taking the lenses out every night.  Sleeping in them increases your chance of eye infection and loss of vision.     OTC Pataday or Lastacaft to be used once daily for itchy eyes.  Use as needed.    Return in 1 year for a complete eye exam or sooner if needed.    Hi Daily, OD    The affects of the dilating drops last for 4- 6 hours.  You will be more sensitive to light and vision will be blurry up close.  Do not drive if you do not feel comfortable.  Mydriatic sunglasses were given if needed.      Optometry Providers       Clinic Locations                                 Telephone Number   Dr. Michelle Winter Wilbur    [facility]  [place] [phone]     [facility] Hours:                [place] [facility] Hours:       [place] [facility] Hours:   [address] [address]   [address]     [address] [place] [address]   [place] [address]    [place] [address]  Phone: [phone]                    Phone: [phone]     Phone: [phone]                      Monday 8:00-6:00                          Monday 8:00-6:00                          Monday 8:00-6:00              Tuesday 8:00-6:00                          Tuesday 8:00-6:00                          Tuesday 8:00-6:00              Wednesday 8:00-6:00                  Wednesday 8:00-6:00                   Wednesday 8:00-6:00      Thursday 8:00-6:00                        Thursday 8:00-6:00                         Thursday 8:00-6:00            Friday 8:00-5:00                              Friday 8:00-5:00                              Friday 8:00-5:00    [place] [facility] Hours:   [address] [facility] [place] [address]  [phone]    Monday 9:00-6:00  Tuesday  9:00-6:00  Wednesday 9:00-6:00  Thursday 9:00-6:00  Friday 9:00-5:00  As always, Thank you for trusting us with your health care needs!

## 2024-11-12 NOTE — TELEPHONE ENCOUNTER
Standard Miralax Bowel Prep recommended due to standard bowel prep. Instructions were sent via [employer].       Radha Dawkins RN  Endoscopy Procedure Pre Assessment   [phone] option 2

## 2024-11-13 ENCOUNTER — OFFICE VISIT (OUTPATIENT)
Dept: OBGYN | Facility: CLINIC | Age: 59
End: 2024-11-13
Payer: COMMERCIAL

## 2024-11-13 VITALS
SYSTOLIC BLOOD PRESSURE: 113 MMHG | HEART RATE: 70 BPM | OXYGEN SATURATION: 96 % | DIASTOLIC BLOOD PRESSURE: 72 MMHG | WEIGHT: 111 LBS | BODY MASS INDEX: 20.43 KG/M2

## 2024-11-13 DIAGNOSIS — E28.39 ESTROGEN DEFICIENCY: ICD-10-CM

## 2024-11-13 DIAGNOSIS — Z12.4 SCREENING FOR CERVICAL CANCER: ICD-10-CM

## 2024-11-13 DIAGNOSIS — Z00.00 ANNUAL PHYSICAL EXAM: Primary | ICD-10-CM

## 2024-11-13 LAB
HPV HR 12 DNA CVX QL NAA+PROBE: NEGATIVE
HPV16 DNA CVX QL NAA+PROBE: NEGATIVE
HPV18 DNA CVX QL NAA+PROBE: NEGATIVE
HUMAN PAPILLOMA VIRUS FINAL DIAGNOSIS: NORMAL

## 2024-11-13 PROCEDURE — 99396 PREV VISIT EST AGE 40-64: CPT | Performed by: OBSTETRICS & GYNECOLOGY

## 2024-11-13 PROCEDURE — 87624 HPV HI-RISK TYP POOLED RSLT: CPT | Performed by: OBSTETRICS & GYNECOLOGY

## 2024-11-13 NOTE — PROGRESS NOTES
Johanne is a 59 year old postmenopausal female, , who is here for her annual exam.  She just had her mammogram yesterday with results pending.  She has her next colonoscopy appt scheduled for 2024.  Her labwork is up-to-date but she has not had a DEXA scan done yet so this order was placed.  She requests use of a pediatric-sized speculum since this works best plus would like to be checked for possible condyloma.    ROS: Ten point review of systems was reviewed and negative except the above.    Health Maintenance   Topic Date Due    COLORECTAL CANCER SCREENING  2024    YEARLY PREVENTIVE VISIT  2024    MAMMO SCREENING  2024    PAP FOLLOW-UP  2024 (Originally 2024)    HPV FOLLOW-UP  12/15/2024 (Originally 2024)    LUNG CANCER SCREENING  11/10/2025 (Originally 2023)    ADVANCE CARE PLANNING  2025    TSH W/FREE T4 REFLEX  2025    GLUCOSE  2027    DTAP/TDAP/TD IMMUNIZATION (3 - Td or Tdap) 2028    LIPID  2029    RSV VACCINE (1 - 1-dose 75+ series) 2038    HEPATITIS C SCREENING  Completed    HIV SCREENING  Completed    PHQ-2 (once per calendar year)  Completed    INFLUENZA VACCINE  Completed    Pneumococcal Vaccine: Pediatrics (0 to 5 Years) and At-Risk Patients (6 to 64 Years)  Completed    ZOSTER IMMUNIZATION  Completed    COVID-19 Vaccine  Completed    HPV IMMUNIZATION  Aged Out    MENINGITIS IMMUNIZATION  Aged Out    RSV MONOCLONAL ANTIBODY  Aged Out    EYE EXAM  Discontinued    PAP  Discontinued      Last pap: due  Last Mammogram: yesterday  Last Dexa: overdue  Last Colonoscopy: scheduled next month  Lab Results   Component Value Date    CHOL 257 2024    CHOL 228 2018     Lab Results   Component Value Date     2024     2018     Lab Results   Component Value Date     2024     2018     Lab Results   Component Value Date    TRIG 55 2024    TRIG 49 2018     No  "results found for: \"CHOLHDLRATIO\"    OBHX:      PSH:   Past Surgical History:   Procedure Laterality Date    BACK SURGERY      dimitris rods    COLONOSCOPY  4/2019    COLONOSCOPY WITH CO2 INSUFFLATION N/A 04/05/2019    Procedure: COLONOSCOPY WITH CO2 INSUFFLATION;  Surgeon: Carol Toure MD;  Location: [facility] OR    ENT SURGERY      sinus    HEAD & NECK SURGERY      partial thyroidectomy     PMH: Her past medical, surgical, and obstetric histories were reviewed and are documented in their appropriate chart areas.    ALL/Meds: Her medication and allergy histories were reviewed and are documented in their appropriate chart areas.    SH/FMH: Her social and family history was reviewed and documented in its appropriate chart area.    PE: /72   Pulse 70   Wt 50.3 kg (111 lb)   SpO2 96%   Breastfeeding No   BMI 20.43 kg/m    Body mass index is 20.43 kg/m .    General Appearance:  healthy, alert, active, no distress  Cardiovascular:  Regular rate and Rhythm without murmur  Neck: Supple, no adenopathy, and thyroid normal  Lungs:  Clear, without wheeze, rale or rhonchi  Breast: normal breast exam  Abdomen: Benign, Soft, flat, non-tender, No masses, organomegaly, No inguinal nodes, and Bowel sounds normoactive.   Pelvic:       - Ext: Vulva and perineum are normal without lesion, mass or discharge, no condyloma were visualized        - Urethra: normal without discharge        - Urethral Meatus: normal appearance       - Bladder: no tenderness, no masses       - Vagina: Normal mucosa, no discharge, atrophic change so a pediatric-sized speculum was used       - Cervix: normal and nulliparous with atrophy       - Uterus:Normal shape, position and consistency        - Adnexa: Non palpable       - Rectal: deferred since she is scheduled to have a colonoscopy exam next month    A/P:  Well Woman Exam, Estrogen Deficiency, Hx of High-Risk HPV Subtypes per DNA Marker Test     -  I discussed the new pap recommendations " "regarding screening.  Explained the rationale for increased intervals between paps.  Questions asked and answered.  She does agree to this regiment.  Pap was collected and submitted to lab.  This was a diagnostic pap smear due to her hx of \"other\" high-risk HPV subtypes per DNA marker test on 4/6/2022.   -  BC: postmenopausal   -  Schedule a DEXA scan due to her estrogen deficient state     Orders Placed This Encounter   Procedures    DX Bone Density    HPV and Gynecologic Cytology Panel      - Encouraged self-breast exam   - Encouraged low fat diet, regular exercise, and adequate calcium intake.    Tanika Orozco, DO  FACOG, FACS     "

## 2024-11-18 LAB
BKR AP ASSOCIATED HPV REPORT: NORMAL
BKR LAB AP GYN ADEQUACY: NORMAL
BKR LAB AP GYN INTERPRETATION: NORMAL
BKR LAB AP PREVIOUS ABNL DX: NORMAL
BKR LAB AP PREVIOUS ABNORMAL: NORMAL
PATH REPORT.COMMENTS IMP SPEC: NORMAL
PATH REPORT.COMMENTS IMP SPEC: NORMAL
PATH REPORT.RELEVANT HX SPEC: NORMAL

## 2024-11-20 ENCOUNTER — TELEPHONE (OUTPATIENT)
Dept: GASTROENTEROLOGY | Facility: CLINIC | Age: 59
End: 2024-11-20
Payer: COMMERCIAL

## 2024-11-20 NOTE — TELEPHONE ENCOUNTER
Caller: Negar    Reason for Reschedule/Cancellation   (please be detailed, any staff messages or encounters to note?): Patient did not realize she would have eating restrictions a week prior      Prior to reschedule please review:  Ordering Provider: Rosalind  Sedation Determined: MAC  Does patient have any ASC Exclusions, please identify?: Yes-BONNIE      Notes on Cancelled Procedure:  Procedure: Lower Endoscopy [Colonoscopy]   Date: 12/3/24  Location: [facility]; [address] , [place] [address]  Surgeon: Long      Rescheduled: Yes,   Procedure: Lower Endoscopy [Colonoscopy]    Date: 1/28/25   Location: [facility]; [address] , [place] [address]   Surgeon: Long  Sedation Level Scheduled  MAC ,  Reason for Sedation Level Per order   Instructions updated and sent: Yes     Does patient need PAC or Pre -Op Rescheduled? : N       Did you cancel or rescheduled an EUS procedure? No.

## 2024-12-23 ENCOUNTER — TRANSFERRED RECORDS (OUTPATIENT)
Dept: HEALTH INFORMATION MANAGEMENT | Facility: CLINIC | Age: 59
End: 2024-12-23

## 2024-12-23 ENCOUNTER — ANCILLARY PROCEDURE (OUTPATIENT)
Dept: BONE DENSITY | Facility: CLINIC | Age: 59
End: 2024-12-23
Attending: OBSTETRICS & GYNECOLOGY
Payer: COMMERCIAL

## 2024-12-23 DIAGNOSIS — M81.0 OSTEOPOROSIS, UNSPECIFIED OSTEOPOROSIS TYPE, UNSPECIFIED PATHOLOGICAL FRACTURE PRESENCE: Primary | ICD-10-CM

## 2024-12-23 DIAGNOSIS — E28.39 ESTROGEN DEFICIENCY: ICD-10-CM

## 2024-12-23 PROCEDURE — 77080 DXA BONE DENSITY AXIAL: CPT | Performed by: RADIOLOGY

## 2024-12-30 ENCOUNTER — TRANSFERRED RECORDS (OUTPATIENT)
Dept: HEALTH INFORMATION MANAGEMENT | Facility: CLINIC | Age: 59
End: 2024-12-30
Payer: COMMERCIAL

## 2024-12-30 NOTE — H&P
[facility] History and Physical    Cynthia M Barthel MRN# [id number]   Age: 59 year old YOB: 1965     Date of Admission:  (Not on file)    Home clinic: [facility]  Primary care provider: Ramakrishna Boyer          Impression and Plan:   Impression:   Special screening for malignant neoplasms, colon [Z12.11]  Last colonoscopy 2022-results not viewable  Colonoscopy 20 19-1 polyp, 5-year repeat recommended  Family history of colon cancer - mult 2nd deg rel with colon ca      Plan:   Proceed to Colonoscopy as planned.  The procedure, risks(bleeding, perforation), benefits and alternatives were discussed and the patient agrees to proceed. Cleared for Anesthesia             Chief Complaint:   Special screening for malignant neoplasms, colon [Z12.11]    History is obtained from the patient          History of Present Illness:   This 59 year old female is being seen at this time for evaluation            Past Medical History:     Past Medical History:   Diagnosis Date    Actinic keratosis     Arthritis     Cervical high risk HPV (human papillomavirus) test positive 11/13/2017, 9/4/19    See problem list    Depressive disorder     Diminishing    History of blood transfusion     1986 Spine Surgery    Thyroid disease     Partial Thyroidectomy            Past Surgical History:     Past Surgical History:   Procedure Laterality Date    BACK SURGERY      shanks rods    COLONOSCOPY  4/2019    COLONOSCOPY WITH CO2 INSUFFLATION N/A 04/05/2019    Procedure: COLONOSCOPY WITH CO2 INSUFFLATION;  Surgeon: Carol Toure MD;  Location: [facility] OR    ENT SURGERY      sinus    HEAD & NECK SURGERY      partial thyroidectomy            Social History:     Social History     Tobacco Use    Smoking status: Former     Current packs/day: 0.00     Average packs/day: 0.5 packs/day for 32.8 years (16.4 ttl pk-yrs)     Types: Cigarettes     Start date: 5/1/1981     Quit date: 2/10/2014     Years  since quitting: 10.8    Smokeless tobacco: Never    Tobacco comments:     started smoking in 2021, 1/2 PPD     Quit in 2/2023   Substance Use Topics    Alcohol use: Yes     Comment: once a month            Family History:     Family History   Problem Relation Age of Onset    Hypertension Mother     Substance Abuse Mother         Still Uses    Depression Father     Substance Abuse Father         Sober over 10 years    Cerebrovascular Disease Maternal Grandfather     Depression Paternal Grandfather     Hypertension Maternal Aunt     Glaucoma Maternal Aunt     Macular Degeneration Maternal Aunt     Cataracts Maternal Aunt     Hypertension Maternal Uncle     Hypertension Paternal Aunt     Diabetes Paternal Uncle     Hypertension Paternal Uncle     Cancer Other     Breast Cancer Cousin     Colon Cancer Other         6 out of 10-maternal    Thyroid Disease No family hx of             Immunizations:     VACCINE/DOSE   Diptheria   DPT   DTAP   HBIG   Hepatitis A   Hepatitis B   HIB   Influenza   Measles   Meningococcal   MMR   Mumps   Pneumococcal   Polio   Rubella   Small Pox   TDAP   Varicella   Zoster            Allergies:     Allergies   Allergen Reactions    Bees Swelling    Wasps [Hornets] Swelling    Hay Fever & [A.R.M.]             Medications:     No current facility-administered medications for this encounter.     Current Outpatient Medications   Medication Sig Dispense Refill    B Complex Vitamins (B COMPLEX 1) TABS Take  by mouth daily.      Calcium-Magnesium-Vitamin D (CITRACAL CALCIUM+D PO) Take 1 tablet by mouth daily      fluorouracil (EFUDEX) 5 % cream Use  Efudex twice daily for 2-3 weeks or until the onset of irritation. 40 g 0    gabapentin (NEURONTIN) 100 MG capsule Take 2 capsules (200 mg) by mouth 2 times daily. Dose change 360 capsule 3    hydrocortisone 2.5 % cream       levothyroxine (SYNTHROID/LEVOTHROID) 75 MCG tablet Take 1 tablet (75 mcg) by mouth daily. 90 tablet 3    mirtazapine (REMERON) 15  MG tablet Take 1 tablet (15 mg) by mouth nightly as needed (sleep). 90 tablet 0    Multiple Vitamin CAPS Take  by mouth daily.      tazarotene (TAZORAC) 0.1 % external cream Apply a pea-sized amount nightly to face. Keep away from eyes. 30 g 3    venlafaxine (EFFEXOR XR) 150 MG 24 hr capsule Take 150 mg by mouth daily.               Review of Systems:   The review of systems was positive for the following findings.  None.  The remainder of the review of systems was unremarkable.          Physical Exam:   All vitals have been reviewed  not currently breastfeeding.  No intake or output data in the 24 hours ending 12/30/24 0749  SHEENT examination revealed NC/aT, EOMI.  Examination of the chest revealed CTA.  Examination of the heart revealed RRR.  Examination of the abdomen revealed Soft, non tender.  The neuromuscular examination was NL.          Data:   All laboratory data reviewed  No results found for any visits on 12/31/24.  -     Troy Humphrey MD, FACS

## 2024-12-31 ENCOUNTER — ANESTHESIA EVENT (OUTPATIENT)
Dept: GASTROENTEROLOGY | Facility: CLINIC | Age: 59
End: 2024-12-31
Payer: COMMERCIAL

## 2024-12-31 ENCOUNTER — ANESTHESIA (OUTPATIENT)
Dept: GASTROENTEROLOGY | Facility: CLINIC | Age: 59
End: 2024-12-31
Payer: COMMERCIAL

## 2024-12-31 ENCOUNTER — HOSPITAL ENCOUNTER (OUTPATIENT)
Facility: CLINIC | Age: 59
Discharge: HOME | End: 2024-12-31
Attending: SPECIALIST | Admitting: SPECIALIST
Payer: COMMERCIAL

## 2024-12-31 VITALS
HEART RATE: 85 BPM | DIASTOLIC BLOOD PRESSURE: 75 MMHG | OXYGEN SATURATION: 99 % | TEMPERATURE: 97.6 F | SYSTOLIC BLOOD PRESSURE: 115 MMHG | RESPIRATION RATE: 16 BRPM

## 2024-12-31 LAB — COLONOSCOPY: NORMAL

## 2024-12-31 PROCEDURE — 370N000017 HC ANESTHESIA TECHNICAL FEE, PER MIN: Performed by: SPECIALIST

## 2024-12-31 PROCEDURE — 250N000011 HC RX IP 250 OP 636: Performed by: NURSE ANESTHETIST, CERTIFIED REGISTERED

## 2024-12-31 PROCEDURE — 45378 DIAGNOSTIC COLONOSCOPY: CPT | Performed by: SPECIALIST

## 2024-12-31 PROCEDURE — 250N000009 HC RX 250: Performed by: NURSE ANESTHETIST, CERTIFIED REGISTERED

## 2024-12-31 PROCEDURE — G0105 COLORECTAL SCRN; HI RISK IND: HCPCS

## 2024-12-31 RX ORDER — ONDANSETRON 2 MG/ML
4 INJECTION INTRAMUSCULAR; INTRAVENOUS EVERY 30 MIN PRN
Status: DISCONTINUED | OUTPATIENT
Start: 2024-12-31 | End: 2024-12-31 | Stop reason: HOSPADM

## 2024-12-31 RX ORDER — PROPOFOL 10 MG/ML
INJECTION, EMULSION INTRAVENOUS PRN
Status: DISCONTINUED | OUTPATIENT
Start: 2024-12-31 | End: 2024-12-31

## 2024-12-31 RX ORDER — ONDANSETRON 4 MG/1
4 TABLET, ORALLY DISINTEGRATING ORAL EVERY 30 MIN PRN
Status: DISCONTINUED | OUTPATIENT
Start: 2024-12-31 | End: 2024-12-31 | Stop reason: HOSPADM

## 2024-12-31 RX ORDER — LIDOCAINE HYDROCHLORIDE 20 MG/ML
INJECTION, SOLUTION INFILTRATION; PERINEURAL PRN
Status: DISCONTINUED | OUTPATIENT
Start: 2024-12-31 | End: 2024-12-31

## 2024-12-31 RX ORDER — LIDOCAINE 40 MG/G
CREAM TOPICAL
Status: DISCONTINUED | OUTPATIENT
Start: 2024-12-31 | End: 2024-12-31 | Stop reason: HOSPADM

## 2024-12-31 RX ORDER — NALOXONE HYDROCHLORIDE 0.4 MG/ML
0.1 INJECTION, SOLUTION INTRAMUSCULAR; INTRAVENOUS; SUBCUTANEOUS
Status: DISCONTINUED | OUTPATIENT
Start: 2024-12-31 | End: 2024-12-31 | Stop reason: HOSPADM

## 2024-12-31 RX ORDER — PROPOFOL 10 MG/ML
INJECTION, EMULSION INTRAVENOUS CONTINUOUS PRN
Status: DISCONTINUED | OUTPATIENT
Start: 2024-12-31 | End: 2024-12-31

## 2024-12-31 RX ORDER — DEXAMETHASONE SODIUM PHOSPHATE 10 MG/ML
4 INJECTION, SOLUTION INTRAMUSCULAR; INTRAVENOUS
Status: DISCONTINUED | OUTPATIENT
Start: 2024-12-31 | End: 2024-12-31 | Stop reason: HOSPADM

## 2024-12-31 RX ADMIN — LIDOCAINE HYDROCHLORIDE 50 MG: 20 INJECTION, SOLUTION INFILTRATION; PERINEURAL at 12:17

## 2024-12-31 RX ADMIN — MIDAZOLAM 2 MG: 1 INJECTION INTRAMUSCULAR; INTRAVENOUS at 12:11

## 2024-12-31 RX ADMIN — PROPOFOL 100 MCG/KG/MIN: 10 INJECTION, EMULSION INTRAVENOUS at 12:19

## 2024-12-31 RX ADMIN — PROPOFOL 50 MG: 10 INJECTION, EMULSION INTRAVENOUS at 12:17

## 2024-12-31 RX ADMIN — PROPOFOL 30 MG: 10 INJECTION, EMULSION INTRAVENOUS at 12:26

## 2024-12-31 ASSESSMENT — ACTIVITIES OF DAILY LIVING (ADL)
ADLS_ACUITY_SCORE: 41

## 2024-12-31 ASSESSMENT — LIFESTYLE VARIABLES: TOBACCO_USE: 1

## 2024-12-31 NOTE — DISCHARGE INSTRUCTIONS
[facility]    Home Care Following Endoscopy          Activity:  You have just undergone an endoscopic procedure usually performed with conscious sedation.  Do not work or operate machinery (including a car) for at least 12 hours.    I encourage you to walk and attempt to pass this air as soon as possible.    Diet:  Return to the diet you were on before your procedure but eat lightly for the first 12-24 hours.  Drink plenty of water.  Resume any regular medications unless otherwise advised by your physician.  Please begin any new medication prescribed as a result of your procedure as directed by your physician.   If you had any biopsy or polyp removed please refrain from aspirin or aspirin products for 2 days.  If on Coumadin please restart as instructed by your physician.   Pain:  You may take Tylenol as needed for pain.  Expected Recovery:  You can expect some mild abdominal fullness and/or discomfort due to the air used to inflate your intestinal tract.     Call Your Physician if You Have:    After Colonoscopy:  Worsening persisting abdominal pain which is worse with activity.  Fevers (>101 degrees F), chills or shakes.  Passage of continued blood with bowel movements.     Any questions or concerns about your recovery, please call [phone] or after hours [phone] ([phone]) Nurse Advice Line.    Follow-up Care:  You did NOT have polyps/biopsy tissue sample(s) removed.

## 2024-12-31 NOTE — ANESTHESIA POSTPROCEDURE EVALUATION
Patient: Cynthia M Barthel    Procedure: Procedure(s):  Colonoscopy       Anesthesia Type:  MAC    Note:  Disposition: Outpatient   Postop Pain Control: Uneventful            Sign Out: Well controlled pain   PONV: No   Neuro/Psych: Uneventful            Sign Out: Acceptable/Baseline neuro status   Airway/Respiratory: Uneventful            Sign Out: Acceptable/Baseline resp. status   CV/Hemodynamics: Uneventful            Sign Out: Acceptable CV status   Other NRE: NONE   DID A NON-ROUTINE EVENT OCCUR? No    Event details/Postop Comments:  Pt was happy with anesthesia care.  No complications.  I will follow up with the pt if needed.           Last vitals:  Vitals Value Taken Time   /75 12/31/24 1315   Temp     Pulse 85 12/31/24 1315   Resp 16 12/31/24 1300   SpO2 99 % 12/31/24 1317   Vitals shown include unfiled device data.    Electronically Signed By: NERY Eid CRNA  December 31, 2024  1:21 PM

## 2024-12-31 NOTE — ANESTHESIA CARE TRANSFER NOTE
Patient: Cynthia M Barthel    Procedure: Procedure(s):  Colonoscopy       Diagnosis: Special screening for malignant neoplasms, colon [Z12.11]  Diagnosis Additional Information: No value filed.    Anesthesia Type:   MAC     Note:    Oropharynx: oropharynx clear of all foreign objects and spontaneously breathing  Level of Consciousness: drowsy  Oxygen Supplementation: face mask    Independent Airway: airway patency satisfactory and stable  Dentition: dentition unchanged  Vital Signs Stable: post-procedure vital signs reviewed and stable  Report to RN Given: handoff report given  Patient transferred to: Phase II    Handoff Report: Identifed the Patient, Identified the Reponsible Provider, Reviewed the pertinent medical history, Discussed the surgical course, Reviewed Intra-OP anesthesia mangement and issues during anesthesia, Set expectations for post-procedure period and Allowed opportunity for questions and acknowledgement of understanding      Vitals:  Vitals Value Taken Time   /69 12/31/24 1245   Temp     Pulse 97 12/31/24 1245   Resp     SpO2 97 % 12/31/24 1245   Vitals shown include unfiled device data.    Electronically Signed By: NERY Eid CRNA  December 31, 2024  12:47 PM

## 2024-12-31 NOTE — ANESTHESIA PREPROCEDURE EVALUATION
Anesthesia Pre-Procedure Evaluation    Patient: Cynthia M Barthel   MRN: [id number] : 1965        Procedure : Procedure(s):  Colonoscopy          Past Medical History:   Diagnosis Date    Actinic keratosis     Arthritis     Cervical high risk HPV (human papillomavirus) test positive 2017, 19    See problem list    Depressive disorder     Diminishing    History of blood transfusion      Spine Surgery    Thyroid disease     Partial Thyroidectomy      Past Surgical History:   Procedure Laterality Date    BACK SURGERY      shanks rods    COLONOSCOPY  2019    COLONOSCOPY WITH CO2 INSUFFLATION N/A 2019    Procedure: COLONOSCOPY WITH CO2 INSUFFLATION;  Surgeon: Carol Toure MD;  Location: [facility] OR    ENT SURGERY      sinus    HEAD & NECK SURGERY      partial thyroidectomy      Allergies   Allergen Reactions    Bees Swelling    Wasps [Hornets] Swelling    Hay Fever & [A.R.M.]       Social History     Tobacco Use    Smoking status: Former     Current packs/day: 0.00     Average packs/day: 0.5 packs/day for 32.8 years (16.4 ttl pk-yrs)     Types: Cigarettes     Start date: 1981     Quit date: 2/10/2014     Years since quitting: 10.8    Smokeless tobacco: Never    Tobacco comments:     started smoking in ,  PPD     Quit in 2023   Substance Use Topics    Alcohol use: Yes     Comment: once a month      Wt Readings from Last 1 Encounters:   24 50.3 kg (111 lb)        Anesthesia Evaluation            ROS/MED HX  ENT/Pulmonary:     (+)                tobacco use, Past use,                       Neurologic: Comment: Scoliosis       Cardiovascular:     (+) Dyslipidemia - -   -  - -                                      METS/Exercise Tolerance:     Hematologic:     (+)      anemia, history of blood transfusion, no previous transfusion reaction,        Musculoskeletal:  - neg musculoskeletal ROS     GI/Hepatic:     (+)        bowel prep,            Renal/Genitourinary:  - neg Renal  "ROS     Endo:     (+)          thyroid problem, hypothyroidism,           Psychiatric/Substance Use:     (+) psychiatric history anxiety and depression alcohol abuse      Infectious Disease:  - neg infectious disease ROS     Malignancy:  - neg malignancy ROS     Other:  - neg other ROS          Physical Exam    Airway  airway exam normal      Mallampati: II   TM distance: > 3 FB   Neck ROM: full   Mouth opening: > 3 cm    Respiratory Devices and Support         Dental           Cardiovascular   cardiovascular exam normal       Rhythm and rate: regular and normal     Pulmonary   pulmonary exam normal        breath sounds clear to auscultation           OUTSIDE LABS:  CBC:   Lab Results   Component Value Date    WBC 8.9 11/05/2024    WBC 8.7 06/12/2023    HGB 13.6 11/05/2024    HGB 13.3 06/12/2023    HCT 40.5 11/05/2024    HCT 39.7 06/12/2023     11/05/2024     06/12/2023     BMP:   Lab Results   Component Value Date     11/05/2024     06/12/2023    POTASSIUM 3.7 11/05/2024    POTASSIUM 4.2 06/12/2023    CHLORIDE 101 11/05/2024    CHLORIDE 104 06/12/2023    CO2 24 11/05/2024    CO2 24 06/12/2023    BUN 11.7 11/05/2024    BUN 12.2 06/12/2023    CR 0.67 11/05/2024    CR 0.69 06/12/2023    GLC 84 11/05/2024    GLC 98 06/12/2023     COAGS: No results found for: \"PTT\", \"INR\", \"FIBR\"  POC: No results found for: \"BGM\", \"HCG\", \"HCGS\"  HEPATIC:   Lab Results   Component Value Date    ALBUMIN 4.5 11/05/2024    PROTTOTAL 7.4 11/05/2024    ALT 49 11/05/2024    AST 29 11/05/2024    ALKPHOS 111 11/05/2024    BILITOTAL 0.6 11/05/2024     OTHER:   Lab Results   Component Value Date    ISREAL 9.6 11/05/2024    TSH 2.00 11/05/2024    T4 1.49 06/12/2023    T3 81 (L) 06/12/2023       Anesthesia Plan    ASA Status:  2    NPO Status:  NPO Appropriate    Anesthesia Type: MAC.     - Reason for MAC: straight local not clinically adequate   Induction: Intravenous, Propofol.   Maintenance: Balanced.    "     Consents    Anesthesia Plan(s) and associated risks, benefits, and realistic alternatives discussed. Questions answered and patient/representative(s) expressed understanding.     - Discussed: Risks, Benefits and Alternatives for BOTH SEDATION and the PROCEDURE were discussed     - Discussed with:  Patient       Use of blood products discussed: No .     Postoperative Care       PONV prophylaxis: Background Propofol Infusion     Comments:    Other Comments: The risks and benefits of anesthesia, and the alternatives where applicable, have been discussed with the patient, and they wish to proceed.    Pt has a history of PTSD with Male physicians.  Will administer Versed prior to getting to procedure room to assist with calming patient prior to the procedure.                NERY Eid CRNA    I have reviewed the pertinent notes and labs in the chart from the past 30 days and (re)examined the patient.  Any updates or changes from those notes are reflected in this note.
